# Patient Record
Sex: MALE | Race: ASIAN | NOT HISPANIC OR LATINO | URBAN - METROPOLITAN AREA
[De-identification: names, ages, dates, MRNs, and addresses within clinical notes are randomized per-mention and may not be internally consistent; named-entity substitution may affect disease eponyms.]

---

## 2024-05-30 ENCOUNTER — INPATIENT (INPATIENT)
Facility: HOSPITAL | Age: 74
LOS: 10 days | Discharge: NURSING, REHAB OR LONG-TERM CARE | DRG: 948 | End: 2024-06-10
Attending: INTERNAL MEDICINE | Admitting: INTERNAL MEDICINE
Payer: COMMERCIAL

## 2024-05-30 VITALS
HEART RATE: 91 BPM | DIASTOLIC BLOOD PRESSURE: 73 MMHG | OXYGEN SATURATION: 98 % | RESPIRATION RATE: 19 BRPM | TEMPERATURE: 99 F | SYSTOLIC BLOOD PRESSURE: 145 MMHG

## 2024-05-30 DIAGNOSIS — Z29.9 ENCOUNTER FOR PROPHYLACTIC MEASURES, UNSPECIFIED: ICD-10-CM

## 2024-05-30 DIAGNOSIS — G93.40 ENCEPHALOPATHY, UNSPECIFIED: ICD-10-CM

## 2024-05-30 DIAGNOSIS — F03.90 UNSPECIFIED DEMENTIA, UNSPECIFIED SEVERITY, WITHOUT BEHAVIORAL DISTURBANCE, PSYCHOTIC DISTURBANCE, MOOD DISTURBANCE, AND ANXIETY: ICD-10-CM

## 2024-05-30 DIAGNOSIS — N17.9 ACUTE KIDNEY FAILURE, UNSPECIFIED: ICD-10-CM

## 2024-05-30 DIAGNOSIS — R41.82 ALTERED MENTAL STATUS, UNSPECIFIED: ICD-10-CM

## 2024-05-30 DIAGNOSIS — E11.9 TYPE 2 DIABETES MELLITUS WITHOUT COMPLICATIONS: ICD-10-CM

## 2024-05-30 DIAGNOSIS — I10 ESSENTIAL (PRIMARY) HYPERTENSION: ICD-10-CM

## 2024-05-30 LAB
ALBUMIN SERPL ELPH-MCNC: 3.6 G/DL — SIGNIFICANT CHANGE UP (ref 3.5–5)
ALP SERPL-CCNC: 100 U/L — SIGNIFICANT CHANGE UP (ref 40–120)
ALT FLD-CCNC: 25 U/L DA — SIGNIFICANT CHANGE UP (ref 10–60)
AMMONIA BLD-MCNC: 28 UMOL/L — SIGNIFICANT CHANGE UP (ref 11–32)
AMPHET UR-MCNC: NEGATIVE — SIGNIFICANT CHANGE UP
ANION GAP SERPL CALC-SCNC: 7 MMOL/L — SIGNIFICANT CHANGE UP (ref 5–17)
APPEARANCE UR: CLEAR — SIGNIFICANT CHANGE UP
AST SERPL-CCNC: 16 U/L — SIGNIFICANT CHANGE UP (ref 10–40)
BARBITURATES UR SCN-MCNC: NEGATIVE — SIGNIFICANT CHANGE UP
BASOPHILS # BLD AUTO: 0.05 K/UL — SIGNIFICANT CHANGE UP (ref 0–0.2)
BASOPHILS NFR BLD AUTO: 0.5 % — SIGNIFICANT CHANGE UP (ref 0–2)
BENZODIAZ UR-MCNC: NEGATIVE — SIGNIFICANT CHANGE UP
BILIRUB SERPL-MCNC: 0.4 MG/DL — SIGNIFICANT CHANGE UP (ref 0.2–1.2)
BILIRUB UR-MCNC: NEGATIVE — SIGNIFICANT CHANGE UP
BUN SERPL-MCNC: 38 MG/DL — HIGH (ref 7–18)
CALCIUM SERPL-MCNC: 9.5 MG/DL — SIGNIFICANT CHANGE UP (ref 8.4–10.5)
CHLORIDE SERPL-SCNC: 108 MMOL/L — SIGNIFICANT CHANGE UP (ref 96–108)
CO2 SERPL-SCNC: 23 MMOL/L — SIGNIFICANT CHANGE UP (ref 22–31)
COCAINE METAB.OTHER UR-MCNC: NEGATIVE — SIGNIFICANT CHANGE UP
COLOR SPEC: YELLOW — SIGNIFICANT CHANGE UP
CREAT ?TM UR-MCNC: 49 MG/DL — SIGNIFICANT CHANGE UP
CREAT SERPL-MCNC: 1.56 MG/DL — HIGH (ref 0.5–1.3)
DIFF PNL FLD: NEGATIVE — SIGNIFICANT CHANGE UP
EGFR: 46 ML/MIN/1.73M2 — LOW
EOSINOPHIL # BLD AUTO: 0.39 K/UL — SIGNIFICANT CHANGE UP (ref 0–0.5)
EOSINOPHIL NFR BLD AUTO: 4.2 % — SIGNIFICANT CHANGE UP (ref 0–6)
ETHANOL SERPL-MCNC: <3 MG/DL — SIGNIFICANT CHANGE UP (ref 0–10)
GLUCOSE BLDC GLUCOMTR-MCNC: 131 MG/DL — HIGH (ref 70–99)
GLUCOSE BLDC GLUCOMTR-MCNC: 183 MG/DL — HIGH (ref 70–99)
GLUCOSE BLDC GLUCOMTR-MCNC: 255 MG/DL — HIGH (ref 70–99)
GLUCOSE SERPL-MCNC: 363 MG/DL — HIGH (ref 70–99)
GLUCOSE UR QL: >=1000 MG/DL
HCT VFR BLD CALC: 46.3 % — SIGNIFICANT CHANGE UP (ref 39–50)
HGB BLD-MCNC: 15 G/DL — SIGNIFICANT CHANGE UP (ref 13–17)
HIV 1 & 2 AB SERPL IA.RAPID: SIGNIFICANT CHANGE UP
IMM GRANULOCYTES NFR BLD AUTO: 0.3 % — SIGNIFICANT CHANGE UP (ref 0–0.9)
KETONES UR-MCNC: ABNORMAL MG/DL
LACTATE SERPL-SCNC: 1.9 MMOL/L — SIGNIFICANT CHANGE UP (ref 0.7–2)
LEUKOCYTE ESTERASE UR-ACNC: NEGATIVE — SIGNIFICANT CHANGE UP
LYMPHOCYTES # BLD AUTO: 1.79 K/UL — SIGNIFICANT CHANGE UP (ref 1–3.3)
LYMPHOCYTES # BLD AUTO: 19.1 % — SIGNIFICANT CHANGE UP (ref 13–44)
MAGNESIUM SERPL-MCNC: 2.1 MG/DL — SIGNIFICANT CHANGE UP (ref 1.6–2.6)
MCHC RBC-ENTMCNC: 28.9 PG — SIGNIFICANT CHANGE UP (ref 27–34)
MCHC RBC-ENTMCNC: 32.4 GM/DL — SIGNIFICANT CHANGE UP (ref 32–36)
MCV RBC AUTO: 89.2 FL — SIGNIFICANT CHANGE UP (ref 80–100)
METHADONE UR-MCNC: NEGATIVE — SIGNIFICANT CHANGE UP
MONOCYTES # BLD AUTO: 0.6 K/UL — SIGNIFICANT CHANGE UP (ref 0–0.9)
MONOCYTES NFR BLD AUTO: 6.4 % — SIGNIFICANT CHANGE UP (ref 2–14)
NEUTROPHILS # BLD AUTO: 6.53 K/UL — SIGNIFICANT CHANGE UP (ref 1.8–7.4)
NEUTROPHILS NFR BLD AUTO: 69.5 % — SIGNIFICANT CHANGE UP (ref 43–77)
NITRITE UR-MCNC: NEGATIVE — SIGNIFICANT CHANGE UP
NRBC # BLD: 0 /100 WBCS — SIGNIFICANT CHANGE UP (ref 0–0)
OPIATES UR-MCNC: NEGATIVE — SIGNIFICANT CHANGE UP
OSMOLALITY UR: 697 MOS/KG — SIGNIFICANT CHANGE UP (ref 50–1200)
PCP SPEC-MCNC: SIGNIFICANT CHANGE UP
PCP UR-MCNC: NEGATIVE — SIGNIFICANT CHANGE UP
PH UR: 6 — SIGNIFICANT CHANGE UP (ref 5–8)
PHOSPHATE SERPL-MCNC: 3.1 MG/DL — SIGNIFICANT CHANGE UP (ref 2.5–4.5)
PLATELET # BLD AUTO: 259 K/UL — SIGNIFICANT CHANGE UP (ref 150–400)
POTASSIUM SERPL-MCNC: 4.7 MMOL/L — SIGNIFICANT CHANGE UP (ref 3.5–5.3)
POTASSIUM SERPL-SCNC: 4.7 MMOL/L — SIGNIFICANT CHANGE UP (ref 3.5–5.3)
PROT SERPL-MCNC: 7.7 G/DL — SIGNIFICANT CHANGE UP (ref 6–8.3)
PROT UR-MCNC: NEGATIVE MG/DL — SIGNIFICANT CHANGE UP
RBC # BLD: 5.19 M/UL — SIGNIFICANT CHANGE UP (ref 4.2–5.8)
RBC # FLD: 14.3 % — SIGNIFICANT CHANGE UP (ref 10.3–14.5)
SODIUM SERPL-SCNC: 138 MMOL/L — SIGNIFICANT CHANGE UP (ref 135–145)
SODIUM UR-SCNC: 99 MMOL/L — SIGNIFICANT CHANGE UP
SP GR SPEC: 1.02 — SIGNIFICANT CHANGE UP (ref 1–1.03)
THC UR QL: NEGATIVE — SIGNIFICANT CHANGE UP
UROBILINOGEN FLD QL: 0.2 MG/DL — SIGNIFICANT CHANGE UP (ref 0.2–1)
WBC # BLD: 9.39 K/UL — SIGNIFICANT CHANGE UP (ref 3.8–10.5)
WBC # FLD AUTO: 9.39 K/UL — SIGNIFICANT CHANGE UP (ref 3.8–10.5)

## 2024-05-30 PROCEDURE — 70551 MRI BRAIN STEM W/O DYE: CPT | Mod: 26

## 2024-05-30 PROCEDURE — 90792 PSYCH DIAG EVAL W/MED SRVCS: CPT

## 2024-05-30 PROCEDURE — 99285 EMERGENCY DEPT VISIT HI MDM: CPT | Mod: 25

## 2024-05-30 PROCEDURE — 99223 1ST HOSP IP/OBS HIGH 75: CPT

## 2024-05-30 PROCEDURE — 93010 ELECTROCARDIOGRAM REPORT: CPT

## 2024-05-30 PROCEDURE — 70450 CT HEAD/BRAIN W/O DYE: CPT | Mod: 26,MC

## 2024-05-30 RX ORDER — GABAPENTIN 400 MG/1
0 CAPSULE ORAL
Refills: 0 | DISCHARGE

## 2024-05-30 RX ORDER — HEPARIN SODIUM 5000 [USP'U]/ML
5000 INJECTION INTRAVENOUS; SUBCUTANEOUS EVERY 8 HOURS
Refills: 0 | Status: DISCONTINUED | OUTPATIENT
Start: 2024-05-30 | End: 2024-05-31

## 2024-05-30 RX ORDER — GABAPENTIN 400 MG/1
300 CAPSULE ORAL DAILY
Refills: 0 | Status: DISCONTINUED | OUTPATIENT
Start: 2024-05-30 | End: 2024-06-10

## 2024-05-30 RX ORDER — CARVEDILOL PHOSPHATE 80 MG/1
12.5 CAPSULE, EXTENDED RELEASE ORAL EVERY 12 HOURS
Refills: 0 | Status: DISCONTINUED | OUTPATIENT
Start: 2024-05-30 | End: 2024-06-10

## 2024-05-30 RX ORDER — QUETIAPINE FUMARATE 200 MG/1
25 TABLET, FILM COATED ORAL EVERY 6 HOURS
Refills: 0 | Status: DISCONTINUED | OUTPATIENT
Start: 2024-05-30 | End: 2024-05-31

## 2024-05-30 RX ORDER — LANOLIN ALCOHOL/MO/W.PET/CERES
3 CREAM (GRAM) TOPICAL AT BEDTIME
Refills: 0 | Status: DISCONTINUED | OUTPATIENT
Start: 2024-05-30 | End: 2024-06-10

## 2024-05-30 RX ORDER — ONDANSETRON 8 MG/1
4 TABLET, FILM COATED ORAL EVERY 8 HOURS
Refills: 0 | Status: DISCONTINUED | OUTPATIENT
Start: 2024-05-30 | End: 2024-06-10

## 2024-05-30 RX ORDER — AMLODIPINE BESYLATE 2.5 MG/1
5 TABLET ORAL EVERY 24 HOURS
Refills: 0 | Status: DISCONTINUED | OUTPATIENT
Start: 2024-05-30 | End: 2024-06-10

## 2024-05-30 RX ORDER — INSULIN LISPRO 100/ML
VIAL (ML) SUBCUTANEOUS AT BEDTIME
Refills: 0 | Status: DISCONTINUED | OUTPATIENT
Start: 2024-05-30 | End: 2024-06-10

## 2024-05-30 RX ORDER — MIRTAZAPINE 45 MG/1
7.5 TABLET, ORALLY DISINTEGRATING ORAL AT BEDTIME
Refills: 0 | Status: DISCONTINUED | OUTPATIENT
Start: 2024-05-30 | End: 2024-06-10

## 2024-05-30 RX ORDER — QUETIAPINE FUMARATE 200 MG/1
25 TABLET, FILM COATED ORAL AT BEDTIME
Refills: 0 | Status: DISCONTINUED | OUTPATIENT
Start: 2024-05-30 | End: 2024-05-31

## 2024-05-30 RX ORDER — BRIMONIDINE TARTRATE, TIMOLOL MALEATE 2; 5 MG/ML; MG/ML
1 SOLUTION/ DROPS OPHTHALMIC
Refills: 0 | DISCHARGE

## 2024-05-30 RX ORDER — ATORVASTATIN CALCIUM 80 MG/1
80 TABLET, FILM COATED ORAL AT BEDTIME
Refills: 0 | Status: DISCONTINUED | OUTPATIENT
Start: 2024-05-30 | End: 2024-06-10

## 2024-05-30 RX ORDER — CHOLECALCIFEROL (VITAMIN D3) 125 MCG
1 CAPSULE ORAL
Refills: 0 | DISCHARGE

## 2024-05-30 RX ORDER — PANTOPRAZOLE SODIUM 20 MG/1
40 TABLET, DELAYED RELEASE ORAL
Refills: 0 | Status: DISCONTINUED | OUTPATIENT
Start: 2024-05-30 | End: 2024-06-10

## 2024-05-30 RX ORDER — INSULIN LISPRO 100/ML
5 VIAL (ML) SUBCUTANEOUS
Refills: 0 | Status: DISCONTINUED | OUTPATIENT
Start: 2024-05-30 | End: 2024-06-05

## 2024-05-30 RX ORDER — INSULIN GLARGINE 100 [IU]/ML
20 INJECTION, SOLUTION SUBCUTANEOUS AT BEDTIME
Refills: 0 | Status: DISCONTINUED | OUTPATIENT
Start: 2024-05-30 | End: 2024-06-05

## 2024-05-30 RX ORDER — INSULIN LISPRO 100/ML
VIAL (ML) SUBCUTANEOUS EVERY 6 HOURS
Refills: 0 | Status: DISCONTINUED | OUTPATIENT
Start: 2024-05-30 | End: 2024-05-30

## 2024-05-30 RX ORDER — LISINOPRIL 2.5 MG/1
20 TABLET ORAL DAILY
Refills: 0 | Status: DISCONTINUED | OUTPATIENT
Start: 2024-05-30 | End: 2024-06-10

## 2024-05-30 RX ORDER — LATANOPROST 0.05 MG/ML
1 SOLUTION/ DROPS OPHTHALMIC; TOPICAL AT BEDTIME
Refills: 0 | Status: DISCONTINUED | OUTPATIENT
Start: 2024-05-30 | End: 2024-06-10

## 2024-05-30 RX ORDER — MIRTAZAPINE 45 MG/1
1 TABLET, ORALLY DISINTEGRATING ORAL
Refills: 0 | DISCHARGE

## 2024-05-30 RX ORDER — CARVEDILOL PHOSPHATE 80 MG/1
1 CAPSULE, EXTENDED RELEASE ORAL
Refills: 0 | DISCHARGE

## 2024-05-30 RX ORDER — LATANOPROST 0.05 MG/ML
1 SOLUTION/ DROPS OPHTHALMIC; TOPICAL
Refills: 0 | DISCHARGE

## 2024-05-30 RX ORDER — AMLODIPINE BESYLATE 2.5 MG/1
1 TABLET ORAL
Refills: 0 | DISCHARGE

## 2024-05-30 RX ORDER — SODIUM CHLORIDE 9 MG/ML
1000 INJECTION INTRAMUSCULAR; INTRAVENOUS; SUBCUTANEOUS ONCE
Refills: 0 | Status: COMPLETED | OUTPATIENT
Start: 2024-05-30 | End: 2024-05-30

## 2024-05-30 RX ORDER — ACETAMINOPHEN 500 MG
650 TABLET ORAL EVERY 6 HOURS
Refills: 0 | Status: DISCONTINUED | OUTPATIENT
Start: 2024-05-30 | End: 2024-06-10

## 2024-05-30 RX ORDER — PANTOPRAZOLE SODIUM 20 MG/1
1 TABLET, DELAYED RELEASE ORAL
Refills: 0 | DISCHARGE

## 2024-05-30 RX ORDER — INSULIN ASPART 100 [IU]/ML
10 INJECTION, SOLUTION SUBCUTANEOUS
Refills: 0 | DISCHARGE

## 2024-05-30 RX ORDER — INSULIN LISPRO 100/ML
VIAL (ML) SUBCUTANEOUS
Refills: 0 | Status: DISCONTINUED | OUTPATIENT
Start: 2024-05-30 | End: 2024-05-30

## 2024-05-30 RX ORDER — FUROSEMIDE 40 MG
1 TABLET ORAL
Refills: 0 | DISCHARGE

## 2024-05-30 RX ORDER — INSULIN LISPRO 100/ML
VIAL (ML) SUBCUTANEOUS AT BEDTIME
Refills: 0 | Status: DISCONTINUED | OUTPATIENT
Start: 2024-05-30 | End: 2024-05-30

## 2024-05-30 RX ORDER — INSULIN LISPRO 100/ML
VIAL (ML) SUBCUTANEOUS
Refills: 0 | Status: DISCONTINUED | OUTPATIENT
Start: 2024-05-30 | End: 2024-06-10

## 2024-05-30 RX ORDER — LISINOPRIL 2.5 MG/1
1 TABLET ORAL
Refills: 0 | DISCHARGE

## 2024-05-30 RX ADMIN — Medication 3 MILLIGRAM(S): at 23:06

## 2024-05-30 RX ADMIN — MIRTAZAPINE 7.5 MILLIGRAM(S): 45 TABLET, ORALLY DISINTEGRATING ORAL at 21:40

## 2024-05-30 RX ADMIN — Medication 3: at 10:25

## 2024-05-30 RX ADMIN — AMLODIPINE BESYLATE 5 MILLIGRAM(S): 2.5 TABLET ORAL at 16:47

## 2024-05-30 RX ADMIN — LATANOPROST 1 DROP(S): 0.05 SOLUTION/ DROPS OPHTHALMIC; TOPICAL at 21:40

## 2024-05-30 RX ADMIN — INSULIN GLARGINE 20 UNIT(S): 100 INJECTION, SOLUTION SUBCUTANEOUS at 22:44

## 2024-05-30 RX ADMIN — ATORVASTATIN CALCIUM 80 MILLIGRAM(S): 80 TABLET, FILM COATED ORAL at 21:40

## 2024-05-30 RX ADMIN — QUETIAPINE FUMARATE 25 MILLIGRAM(S): 200 TABLET, FILM COATED ORAL at 21:40

## 2024-05-30 RX ADMIN — SODIUM CHLORIDE 2000 MILLILITER(S): 9 INJECTION INTRAMUSCULAR; INTRAVENOUS; SUBCUTANEOUS at 06:38

## 2024-05-30 NOTE — BH CONSULTATION LIAISON ASSESSMENT NOTE - NSBHCHARTREVIEWVS_PSY_A_CORE FT
Vital Signs Last 24 Hrs  T(C): 36.4 (30 May 2024 14:54), Max: 37.1 (30 May 2024 06:09)  T(F): 97.5 (30 May 2024 14:54), Max: 98.7 (30 May 2024 06:09)  HR: 69 (30 May 2024 14:54) (69 - 91)  BP: 149/53 (30 May 2024 14:54) (145/73 - 165/81)  BP(mean): --  RR: 18 (30 May 2024 14:54) (18 - 19)  SpO2: 96% (30 May 2024 14:54) (96% - 98%)    Parameters below as of 30 May 2024 12:31  Patient On (Oxygen Delivery Method): room air

## 2024-05-30 NOTE — BH CONSULTATION LIAISON ASSESSMENT NOTE - NSBHCHARTREVIEWLAB_PSY_A_CORE FT
CBC Full  -  ( 30 May 2024 06:25 )  WBC Count : 9.39 K/uL  RBC Count : 5.19 M/uL  Hemoglobin : 15.0 g/dL  Hematocrit : 46.3 %  Platelet Count - Automated : 259 K/uL  Mean Cell Volume : 89.2 fl  Mean Cell Hemoglobin : 28.9 pg  Mean Cell Hemoglobin Concentration : 32.4 gm/dL  Auto Neutrophil # : 6.53 K/uL  Auto Lymphocyte # : 1.79 K/uL  Auto Monocyte # : 0.60 K/uL  Auto Eosinophil # : 0.39 K/uL  Auto Basophil # : 0.05 K/uL  Auto Neutrophil % : 69.5 %  Auto Lymphocyte % : 19.1 %  Auto Monocyte % : 6.4 %  Auto Eosinophil % : 4.2 %  Auto Basophil % : 0.5 %  05-30    138  |  108  |  38<H>  ----------------------------<  363<H>  4.7   |  23  |  1.56<H>    Ca    9.5      30 May 2024 06:25  Phos  3.1     05-30  Mg     2.1     05-30    TPro  7.7  /  Alb  3.6  /  TBili  0.4  /  DBili  x   /  AST  16  /  ALT  25  /  AlkPhos  100  05-30

## 2024-05-30 NOTE — PATIENT PROFILE ADULT - FUNCTIONAL ASSESSMENT - BASIC MOBILITY 6.
2-calculated by average/Not able to assess (calculate score using [facility] averaging method)

## 2024-05-30 NOTE — PROVIDER CONTACT NOTE (EICU) - ACTION/TREATMENT ORDERED:
Home medications reviewed with Pharmacist at [facility], [phone]. Medication reconciliation updated.

## 2024-05-30 NOTE — ED PROVIDER NOTE - OBJECTIVE STATEMENT
74-year-old male with dementia brought in by family for aggressive behavior.  Patient's granddaughter at bedside says patient is aggressive every other day however the able to calm him down however last night they were not able to calm him down.  He was throwing things and not redirectable.  He has not been more short of breath

## 2024-05-30 NOTE — H&P ADULT - NSHPPHYSICALEXAM_GEN_ALL_CORE
General - NAD, lying in bed, appears comfortable, confused, elderly  Eyes - PERRLA, EOM intact  ENT - Nonicteric sclerae, PERRLA, EOMI. Oropharynx clear. Moist mucous membranes. Conjunctivae appear well perfused.   Neck - No noticeable or palpable swelling, redness or rash around throat or on face  Lymph Nodes - No lymphadenopathy  Cardiovascular - RRR no m/r/g, no JVD, no carotid bruits  Lungs - Clear to auscultation, no use of accessory muscles, no crackles or wheezes.  Skin - No rashes, skin warm and dry, no erythematous areas  Abdomen - Normal bowel sounds, abdomen soft and nontender  Rectal – Rectal exam not performed since no symptoms indicated blood loss.  Extremities - No edema, cyanosis or clubbing  Musculoskeletal - 5/5 strength, normal range of motion, no swollen or erythematous joints.  Neuro– Alert and oriented x 2, visually impaired in both eyes.

## 2024-05-30 NOTE — H&P ADULT - HISTORY OF PRESENT ILLNESS
74M PMH dementia, DM, and HTN presenting to the ED with AMS. Pt seen at bedside [email or web address], states he does not know why he was brought to the hospital, but was complaining of bilateral knee pain. Daughter called for collateral information, reports that for the past week, her father has become more confused and aggressive at home, pulling out electrical wires at home and trying to flush his clothes down the toilet. He has a HHA during the day for 12 hours x 5 days and his granddaughter stays the rest of the night with him, however, he has been becoming more aggressive and cursing out the granddaughter. He has been having urinary and bowel incontinence. He is blind in both eyes and ambulates with a walker however has been losing his balance lately with no falls. He denies any fevers, chills, CP, SOB, N/V/D, abdominal pain, dysuria, numbness/tingling in extremities.  74M PMH dementia, DM, and HTN presenting to the ED with AMS. Pt seen at bedside [email or web address], states he does not know why he was brought to the hospital, but was complaining of bilateral knee pain. Daughter called for collateral information, reports that for the past week, her father has become more confused and aggressive at home, pulling out electrical wires at home and trying to flush his clothes down the toilet. He has a HHA during the day for 12 hours x 5 days and his granddaughter stays the rest of the night with him, however, he has been becoming more aggressive and cursing out the granddaughter. He has been having urinary and bowel incontinence. He is blind in both eyes and ambulates with a walker however has been losing his balance lately with no falls. He denies any fevers, chills, CP, SOB, N/V/D, abdominal pain, dysuria, numbness/tingling in extremities.

## 2024-05-30 NOTE — H&P ADULT - PROBLEM SELECTOR PLAN 2
Xifaxan Pending    Insurance response  Prescription Drug Insurance: [facility]  Notes: Prior authorization submitted to patient insurance company and will update provider when decision has been made.        SCr 1.56, unknown baseline  received 1L IVF in ED   f/u urine lytes  gentle hydration  trend SCr

## 2024-05-30 NOTE — ED PROVIDER NOTE - NS ED MD DISPO SPECIAL CONSIDERATION1
None Partially impaired: cannot see medication labels or newsprint, but can see obstacles in path, and the surrounding layout; can count fingers at arm's length

## 2024-05-30 NOTE — BH CONSULTATION LIAISON ASSESSMENT NOTE - CURRENT MEDICATION
MEDICATIONS  (STANDING):  amLODIPine   Tablet 5 milliGRAM(s) Oral every 24 hours  atorvastatin 80 milliGRAM(s) Oral at bedtime  carvedilol 12.5 milliGRAM(s) Oral every 12 hours  gabapentin 300 milliGRAM(s) Oral daily  heparin   Injectable 5000 Unit(s) SubCutaneous every 8 hours  insulin lispro (ADMELOG) corrective regimen sliding scale   SubCutaneous at bedtime  insulin lispro (ADMELOG) corrective regimen sliding scale   SubCutaneous three times a day before meals  latanoprost 0.005% Ophthalmic Solution 1 Drop(s) Both EYES at bedtime  lisinopril 20 milliGRAM(s) Oral daily  mirtazapine 7.5 milliGRAM(s) Oral at bedtime  pantoprazole    Tablet 40 milliGRAM(s) Oral before breakfast    MEDICATIONS  (PRN):  acetaminophen     Tablet .. 650 milliGRAM(s) Oral every 6 hours PRN Temp greater or equal to 38C (100.4F), Mild Pain (1 - 3)  aluminum hydroxide/magnesium hydroxide/simethicone Suspension 30 milliLiter(s) Oral every 4 hours PRN Dyspepsia  melatonin 3 milliGRAM(s) Oral at bedtime PRN Insomnia  ondansetron Injectable 4 milliGRAM(s) IV Push every 8 hours PRN Nausea and/or Vomiting

## 2024-05-30 NOTE — BH CONSULTATION LIAISON ASSESSMENT NOTE - NSICDXBHSECONDARYDX_PSY_ALL_CORE
Acute encephalopathy   G93.40  Dementia   F03.90  DM (diabetes mellitus)   E11.9  HTN (hypertension)   I10  Prophylactic measure   Z29.9  VITA (acute kidney injury)   N17.9

## 2024-05-30 NOTE — H&P ADULT - ASSESSMENT
74M PMH dementia, DM, and HTN presenting to the ED with AMS admitted for acute encephalopathy 2/2 worsening dementia vs NPH

## 2024-05-30 NOTE — ED PROVIDER NOTE - PHYSICAL EXAMINATION
General: Well appearing, not agitated, appears stated age  HEENT: normocephalic, atraumatic   Respiratory: normal work of breathing  MSK: no swelling or tenderness of lower extremities, moving all extremities spontaneously   Skin: warm, dry  Neuro: A&Ox1, cranial nerves II-XII intact, 5/5 strength in all extremities, no sensory deficits, normal gait   Psych: appropriate affect

## 2024-05-30 NOTE — BH CONSULTATION LIAISON ASSESSMENT NOTE - SUMMARY
73 y/o M with a hx of dementia, HTN, DM2, and blindness, who is admitted due to aggression and behavioral disturbances at home. Consulted for the same reason. Patient's presentation appears to be consistent with a major neurocognitive disorder like an advancing dementia with behavioral distuabrnces. He was irritable and mildly agitated upon interview. He is not suicidal, homicidal or psychotic.    -Consider Seroquel 25 mg PO @ 8-9PM  -PRN: Seroquel 25 mg PO q 6 hrs PRN for mild to moderate agitation/insomnia            Haldol 1 mg IM q 8 hrs PRN for severe agitation  -No need for an inpatient psychiatric admission or 1:1; might need EO as per primary team  -Check an EKG; monitor his QTc  -SW f/u  -Case discussed with the primary team  -Will follow as needed

## 2024-05-30 NOTE — CONSULT NOTE ADULT - SUBJECTIVE AND OBJECTIVE BOX
NEUROLOGY CONSULT NOTE    NAME:  MAXX PONCE      ASSESSMENT:  74 RHM with chronic b/l blindness and recent decline in cognition and development of behavioral disturbances and insomnia over 6 months, concerning for dementia vs. normal pressure hydrocephalus      RECOMMENDATIONS:    - Maintain Q4H VS & Neurochecks    - Okay to administer Quetiapine 25mg PO QHS to help manage behavioral disturbances and insomnia    - An additional Quetiapine 25mg PO Daily PRN Agitation can be made available    - MRI Brain with CSF Flow approved to evaluate for intracranial structural abnormalities and any evidence of CSF flow obstruction       - Contrast is avoided due to low GFR    - I have discussed the risks and benefits of bedside lumbar puncture with the patient's daughter       - Patient daughter will review information and does not wish to pursue this procedure at this time       - Lumbar puncture may be reconsidered based on MRI Brain findings    - Metabolic and infectious workup and treatment, including management of diabetes, as per primary team    - DVT ppx: SCDs, Heparin          NOTE TO BE COMPLETED - PLEASE REFER TO ABOVE ONLY AND IGNORE INFORMATION BELOW    *******************************      CHIEF COMPLAINT:  Patient is a 74y old  Male who presents with a chief complaint of AMS (30 May 2024 10:14)      HPI:  74M PMH dementia, DM, and HTN presenting to the ED with AMS. Pt seen at bedside AAOX2, states he does not know why he was brought to the hospital, but was complaining of bilateral knee pain. Daughter called for collateral information, reports that for the past week, her father has become more confused and aggressive at home, pulling out electrical wires at home and trying to flush his clothes down the toilet. He has a HHA during the day for 12 hours x 5 days and his granddaughter stays the rest of the night with him, however, he has been becoming more aggressive and cursing out the granddaughter. He has been having urinary and bowel incontinence. He is blind in both eyes and ambulates with a walker however has been losing his balance lately with no falls. He denies any fevers, chills, CP, SOB, N/V/D, abdominal pain, dysuria, numbness/tingling in extremities.      (30 May 2024 10:14)      NEURO HPI:      PAST MEDICAL & SURGICAL HISTORY:      MEDICATIONS:  acetaminophen     Tablet .. 650 milliGRAM(s) Oral every 6 hours PRN  aluminum hydroxide/magnesium hydroxide/simethicone Suspension 30 milliLiter(s) Oral every 4 hours PRN  amLODIPine   Tablet 5 milliGRAM(s) Oral every 24 hours  atorvastatin 80 milliGRAM(s) Oral at bedtime  carvedilol 12.5 milliGRAM(s) Oral every 12 hours  gabapentin 300 milliGRAM(s) Oral daily  heparin   Injectable 5000 Unit(s) SubCutaneous every 8 hours  insulin lispro (ADMELOG) corrective regimen sliding scale   SubCutaneous at bedtime  insulin lispro (ADMELOG) corrective regimen sliding scale   SubCutaneous three times a day before meals  latanoprost 0.005% Ophthalmic Solution 1 Drop(s) Both EYES at bedtime  lisinopril 20 milliGRAM(s) Oral daily  melatonin 3 milliGRAM(s) Oral at bedtime PRN  mirtazapine 7.5 milliGRAM(s) Oral at bedtime  ondansetron Injectable 4 milliGRAM(s) IV Push every 8 hours PRN  pantoprazole    Tablet 40 milliGRAM(s) Oral before breakfast      ALLERGIES:  No Known Allergies      FAMILY HISTORY:        SOCIAL HISTORY:  Denies alcohol, tobacco, or illicit drug use      REVIEW OF SYSTEMS:  GENERAL: No fever, weight changes, fatigue  EYES: No eye pain or discharge  EAR/NOSE/MOUTH/THROAT: No sinus or throat pain; No difficulty hearing  NECK: No pain or stiffness  RESPIRATORY: No cough, wheezing, chills, or hemoptysis  CARDIOVASCULAR: No chest pain, palpitations, shortness of breath, or dyspnea on exertion  GASTROINTESTINAL: No abdominal pain, nausea, vomiting, hematemesis, diarrhea, or constipation  GENITOURINARY: No dysuria, frequency, hematuria, or incontinence  SKIN: No rashes or lesions  ENDOCRINE: No heat or cold intolerance  HEMATOLOGIC: No easy bruising or bleeding  PSYCHIATRIC: No depression, anxiety, or mood swings  MUSCULOSKELETAL: No joint pain or swelling  NEUROLOGICAL: As per HPI          OBJECTIVE:    Vital Signs Last 24 Hrs  T(C): 36.4 (30 May 2024 14:54), Max: 37.1 (30 May 2024 06:09)  T(F): 97.5 (30 May 2024 14:54), Max: 98.7 (30 May 2024 06:09)  HR: 69 (30 May 2024 14:54) (69 - 91)  BP: 149/53 (30 May 2024 14:54) (145/73 - 165/81)  RR: 18 (30 May 2024 14:54) (18 - 19)  SpO2: 96% (30 May 2024 14:54) (96% - 98%)  Parameters below as of 30 May 2024 12:31  Patient On (Oxygen Delivery Method): room air      General Examination:  General: No acute distress  HEENT: Atraumatic, Normocephalic  Respiratory: CTA B/l.  No crackles, rhonchi, or wheezes.  Cardiovascular: RRR.  Normal S1 & S2.  Normal b/l radial and pedal pulses.    Neurological Examination:  General / Mental Status: AAO x 3.  No aphasia or dysarthria.  Naming and repetition intact.  Cranial Nerves: VFF x 4.  PERRL.  EOMI x 2, No nystagmus or diplopia.  B/l V1-V3 equal and intact to light touch and pinprick.  Symmetric facial movement and palate elevation.  B/l hearing equal to finger rub.  5/5 strength with b/l sternocleidomastoid and trapezius.  Midline tongue protrusion, with no atrophy or fasciculations.  Motor: Normal bulk & tone in all four extremities.  5/5 strength throughout all four extremities.  No downward drift, rigidity, spasticity, or tremors in any of the four extremities.  Sensory: Intact to light touch and pinprick in all four extremities.  Negative Romberg.  Reflex: 2+ and symmetric at b/l biceps, triceps, brachioradialis, patellae, and ankles.  Downgoing toes b/l.  Coordination: No dysmetria with b/l finger-to-nose and heel raise tests.  Symmetric rapid alternating movements b/l.  Gait: Normal, narrow-based gait.  No difficulty with tiptoe, heel, and tandem gaits.        LABORATORY VALUES:                        15.0   9.39  )-----------( 259      ( 30 May 2024 06:25 )             46.3       05-30    138  |  108  |  38<H>  ----------------------------<  363<H>  4.7   |  23  |  1.56<H>    Ca    9.5      30 May 2024 06:25  Phos  3.1     05-30  Mg     2.1     05-30    TPro  7.7  /  Alb  3.6  /  TBili  0.4  /  DBili  x   /  AST  16  /  ALT  25  /  AlkPhos  100  05-30          NEUROIMAGING:          Please contact the Neurology consult service with any neurological questions.    Garth Bonilla MD   of Neurology  [facility] at [facility] NEUROLOGY CONSULT NOTE    NAME:  MAXX PONCE      ASSESSMENT:  74 RHM with chronic b/l blindness and recent decline in cognition and development of behavioral disturbances and insomnia over 6 months, concerning for dementia vs. normal pressure hydrocephalus      RECOMMENDATIONS:    - Maintain Q4H VS & Neurochecks    - Okay to administer Quetiapine 25mg PO QHS to help manage behavioral disturbances and insomnia    - An additional Quetiapine 25mg PO Daily PRN Agitation can be made available    - Check Vitamin B12, Folate, and TSH, and treat if abnormal    - MRI Brain with CSF Flow approved to evaluate for intracranial structural abnormalities and any evidence of CSF flow obstruction       - Contrast is avoided due to low GFR    - I have discussed the risks and benefits of bedside lumbar puncture with the patient's daughter       - Patient daughter will review information and does not wish to pursue this procedure at this time       - Lumbar puncture may be reconsidered based on MRI Brain findings    - Metabolic and infectious workup and treatment, including management of diabetes, as per primary team    - DVT ppx: SCDs, Heparin          *******************************      CHIEF COMPLAINT:  Patient is a 74y old  Male who presents with a chief complaint of AMS (30 May 2024 10:14)      HPI:  74M PMH dementia, DM, and HTN presenting to the ED with AMS. Pt seen at bedside AAOX2, states he does not know why he was brought to the hospital, but was complaining of bilateral knee pain. Daughter called for collateral information, reports that for the past week, her father has become more confused and aggressive at home, pulling out electrical wires at home and trying to flush his clothes down the toilet. He has a HHA during the day for 12 hours x 5 days and his granddaughter stays the rest of the night with him, however, he has been becoming more aggressive and cursing out the granddaughter. He has been having urinary and bowel incontinence. He is blind in both eyes and ambulates with a walker however has been losing his balance lately with no falls. He denies any fevers, chills, CP, SOB, N/V/D, abdominal pain, dysuria, numbness/tingling in extremities.  (30 May 2024 10:14)      NEURO HPI:  History is provided by the patient's daughter at bedside. This is a 74-year-old right-handed man with severely limited vision out of both eyes who has been observed by family to have cognitive decline, insomnia, and increasing frequency of episodes of agitation since January 2024. He has not experienced recent urinary or fecal incontinence, and he uses a cane for support while ambulating due to bilateral knee pain.      PAST MEDICAL & SURGICAL HISTORY:  Cognitive decline as per HPI  Bilateral vision deficit as per HPI      MEDICATIONS:  acetaminophen     Tablet .. 650 milliGRAM(s) Oral every 6 hours PRN  aluminum hydroxide/magnesium hydroxide/simethicone Suspension 30 milliLiter(s) Oral every 4 hours PRN  amLODIPine   Tablet 5 milliGRAM(s) Oral every 24 hours  atorvastatin 80 milliGRAM(s) Oral at bedtime  carvedilol 12.5 milliGRAM(s) Oral every 12 hours  gabapentin 300 milliGRAM(s) Oral daily  heparin   Injectable 5000 Unit(s) SubCutaneous every 8 hours  insulin lispro (ADMELOG) corrective regimen sliding scale   SubCutaneous at bedtime  insulin lispro (ADMELOG) corrective regimen sliding scale   SubCutaneous three times a day before meals  latanoprost 0.005% Ophthalmic Solution 1 Drop(s) Both EYES at bedtime  lisinopril 20 milliGRAM(s) Oral daily  melatonin 3 milliGRAM(s) Oral at bedtime PRN  mirtazapine 7.5 milliGRAM(s) Oral at bedtime  ondansetron Injectable 4 milliGRAM(s) IV Push every 8 hours PRN  pantoprazole    Tablet 40 milliGRAM(s) Oral before breakfast      ALLERGIES:  No Known Allergies      FAMILY HISTORY:  No reported family history of dementia      SOCIAL HISTORY:  No reported alcohol, tobacco, or illicit drug use      REVIEW OF SYSTEMS:  GENERAL: No fever, weight changes, fatigue  EYES: No eye pain or discharge  EAR/NOSE/MOUTH/THROAT: No sinus or throat pain; No difficulty hearing  NECK: No pain or stiffness  RESPIRATORY: No cough, wheezing, chills, or hemoptysis  CARDIOVASCULAR: No chest pain, palpitations, shortness of breath, or dyspnea on exertion  GASTROINTESTINAL: No abdominal pain, nausea, vomiting, hematemesis, diarrhea, or constipation  GENITOURINARY: No dysuria, frequency, hematuria, or incontinence  SKIN: No rashes or lesions  ENDOCRINE: No heat or cold intolerance  HEMATOLOGIC: No easy bruising or bleeding  PSYCHIATRIC: No depression, anxiety, or mood swings  MUSCULOSKELETAL: Bilateral knee pain as per HPI  NEUROLOGICAL: As per HPI          OBJECTIVE:    Vital Signs Last 24 Hrs  T(C): 36.4 (30 May 2024 14:54), Max: 37.1 (30 May 2024 06:09)  T(F): 97.5 (30 May 2024 14:54), Max: 98.7 (30 May 2024 06:09)  HR: 69 (30 May 2024 14:54) (69 - 91)  BP: 149/53 (30 May 2024 14:54) (145/73 - 165/81)  RR: 18 (30 May 2024 14:54) (18 - 19)  SpO2: 96% (30 May 2024 14:54) (96% - 98%)  Parameters below as of 30 May 2024 12:31  Patient On (Oxygen Delivery Method): room air      General Examination:  General: No acute distress  HEENT: Atraumatic, Normocephalic  Respiratory: CTA B/l.  No crackles, rhonchi, or wheezes.  Cardiovascular: RRR.  Normal S1 & S2.  Normal b/l radial and pedal pulses.    Neurological Examination:  General / Mental Status: AAO x 1 (only to person).  No apparent aphasia or dysarthria present.  Cranial Nerves: B/l blink to threat present, but unable to count fingers accurately in any of the four quadrants with either eye.  PERRL.  EOMI x 2, No nystagmus.  B/l V1-V3 equal and intact to light touch and pinprick.  Symmetric facial movement and palate elevation.  B/l hearing equal to finger rub.  At least 3/5 strength with b/l sternocleidomastoid and trapezius, limited by poor effort.  Midline tongue protrusion.  Motor: Normal bulk & tone in all four extremities.  At least 4/5 strength throughout all four extremities.  No downward drift, rigidity, spasticity, or tremors in any of the four extremities.  Sensory: Intact to light touch and pinprick in all four extremities.  Reflex: 1+ and symmetric at b/l biceps, triceps, brachioradialis, patellae, and ankles.  Mute toes b/l.  Coordination: No dysmetria with b/l finger-to-nose and heel raise tests.  Gait and Romberg sign testing deferred per patient preference.          LABORATORY VALUES:                        15.0   9.39  )-----------( 259      ( 30 May 2024 06:25 )             46.3       05-30    138  |  108  |  38<H>  ----------------------------<  363<H>  4.7   |  23  |  1.56<H>    Ca    9.5      30 May 2024 06:25  Phos  3.1     05-30  Mg     2.1     05-30    TPro  7.7  /  Alb  3.6  /  TBili  0.4  /  DBili  x   /  AST  16  /  ALT  25  /  AlkPhos  100  05-30          NEUROIMAGING:      CT Head (5/30/24):  - No acute intracranial abnormality  - Cerebral ventriculomegaly  - Incidental left occipital scalp soft tissue thickening          Please contact the Neurology consult service with any neurological questions.    Garth Bonilla MD   of Neurology  [facility] at [facility] NEUROLOGY CONSULT NOTE    NAME:  MAXX PONCE      ASSESSMENT:  74 RHM with chronic b/l blindness and recent decline in cognition and development of behavioral disturbances and insomnia over 6 months, concerning for dementia vs. normal pressure hydrocephalus      RECOMMENDATIONS:    - Maintain Q4H VS & Neurochecks    - Okay to administer Quetiapine 25mg PO QHS to help manage behavioral disturbances and insomnia    - An additional Quetiapine 25mg PO Daily PRN Agitation can be made available    - Check Vitamin B12, Folate, and TSH, and treat if abnormal    - MRI Brain with CSF Flow approved to evaluate for intracranial structural abnormalities and any evidence of CSF flow obstruction       - Contrast is avoided due to low GFR    - I have discussed the risks and benefits of bedside lumbar puncture with the patient's daughter       - Patient daughter will review information and does not wish to pursue this procedure at this time       - Lumbar puncture may be reconsidered based on MRI Brain findings    - Metabolic and infectious workup and treatment, including management of diabetes, as per primary team    - DVT ppx: SCDs, Heparin          *******************************      CHIEF COMPLAINT:  Patient is a 74y old  Male who presents with a chief complaint of AMS (30 May 2024 10:14)      HPI:  74M PMH dementia, DM, and HTN presenting to the ED with AMS. Pt seen at bedside AAOX2, states he does not know why he was brought to the hospital, but was complaining of bilateral knee pain. Daughter called for collateral information, reports that for the past week, her father has become more confused and aggressive at home, pulling out electrical wires at home and trying to flush his clothes down the toilet. He has a HHA during the day for 12 hours x 5 days and his granddaughter stays the rest of the night with him, however, he has been becoming more aggressive and cursing out the granddaughter. He has been having urinary and bowel incontinence. He is blind in both eyes and ambulates with a walker however has been losing his balance lately with no falls. He denies any fevers, chills, CP, SOB, N/V/D, abdominal pain, dysuria, numbness/tingling in extremities.  (30 May 2024 10:14)      NEURO HPI:  History is provided by the patient's daughter at bedside. This is a 74-year-old right-handed man with severely limited vision out of both eyes who has been observed by family to have cognitive decline, insomnia, and increasing frequency of episodes of agitation since January 2024. He has not experienced recent urinary or fecal incontinence, and he uses a cane for support while ambulating due to bilateral knee pain.      PAST MEDICAL & SURGICAL HISTORY:  Cognitive decline as per HPI  Bilateral vision deficit as per HPI      MEDICATIONS:  acetaminophen     Tablet .. 650 milliGRAM(s) Oral every 6 hours PRN  aluminum hydroxide/magnesium hydroxide/simethicone Suspension 30 milliLiter(s) Oral every 4 hours PRN  amLODIPine   Tablet 5 milliGRAM(s) Oral every 24 hours  atorvastatin 80 milliGRAM(s) Oral at bedtime  carvedilol 12.5 milliGRAM(s) Oral every 12 hours  gabapentin 300 milliGRAM(s) Oral daily  heparin   Injectable 5000 Unit(s) SubCutaneous every 8 hours  insulin lispro (ADMELOG) corrective regimen sliding scale   SubCutaneous at bedtime  insulin lispro (ADMELOG) corrective regimen sliding scale   SubCutaneous three times a day before meals  latanoprost 0.005% Ophthalmic Solution 1 Drop(s) Both EYES at bedtime  lisinopril 20 milliGRAM(s) Oral daily  melatonin 3 milliGRAM(s) Oral at bedtime PRN  mirtazapine 7.5 milliGRAM(s) Oral at bedtime  ondansetron Injectable 4 milliGRAM(s) IV Push every 8 hours PRN  pantoprazole    Tablet 40 milliGRAM(s) Oral before breakfast      ALLERGIES:  No Known Allergies      FAMILY HISTORY:  No reported family history of dementia      SOCIAL HISTORY:  No reported alcohol, tobacco, or illicit drug use      REVIEW OF SYSTEMS:  GENERAL: No fever, weight changes, fatigue  EYES: No eye pain or discharge  EAR/NOSE/MOUTH/THROAT: No sinus or throat pain; No difficulty hearing  NECK: No pain or stiffness  RESPIRATORY: No cough, wheezing, chills, or hemoptysis  CARDIOVASCULAR: No chest pain, palpitations, shortness of breath, or dyspnea on exertion  GASTROINTESTINAL: No abdominal pain, nausea, vomiting, hematemesis, diarrhea, or constipation  GENITOURINARY: No dysuria, frequency, hematuria, or incontinence  SKIN: No rashes or lesions  ENDOCRINE: No heat or cold intolerance  HEMATOLOGIC: No easy bruising or bleeding  PSYCHIATRIC: No depression, anxiety, or mood swings  MUSCULOSKELETAL: Bilateral knee pain as per HPI  NEUROLOGICAL: As per HPI          OBJECTIVE:    Vital Signs Last 24 Hrs  T(C): 36.4 (30 May 2024 14:54), Max: 37.1 (30 May 2024 06:09)  T(F): 97.5 (30 May 2024 14:54), Max: 98.7 (30 May 2024 06:09)  HR: 69 (30 May 2024 14:54) (69 - 91)  BP: 149/53 (30 May 2024 14:54) (145/73 - 165/81)  RR: 18 (30 May 2024 14:54) (18 - 19)  SpO2: 96% (30 May 2024 14:54) (96% - 98%)  Parameters below as of 30 May 2024 12:31  Patient On (Oxygen Delivery Method): room air      General Examination:  General: No acute distress  HEENT: Atraumatic, Normocephalic  Respiratory: CTA B/l.  No crackles, rhonchi, or wheezes.  Cardiovascular: RRR.  Normal S1 & S2.  Normal b/l radial and pedal pulses.    Neurological Examination:  General / Mental Status: AAO x 1 (only to person).  No apparent aphasia or dysarthria present.  Immediate and 5-minute delayed recall: 3/3.  Cranial Nerves: B/l blink to threat present, but unable to count fingers accurately in any of the four quadrants with either eye.  PERRL.  EOMI x 2, No nystagmus.  B/l V1-V3 equal and intact to light touch and pinprick.  Symmetric facial movement and palate elevation.  B/l hearing equal to finger rub.  At least 3/5 strength with b/l sternocleidomastoid and trapezius, limited by poor effort.  Midline tongue protrusion.  Motor: Normal bulk & tone in all four extremities.  At least 4/5 strength throughout all four extremities.  No downward drift, rigidity, spasticity, or tremors in any of the four extremities.  Sensory: Intact to light touch and pinprick in all four extremities.  Reflex: 1+ and symmetric at b/l biceps, triceps, brachioradialis, patellae, and ankles.  Mute toes b/l.  Coordination: No dysmetria with b/l finger-to-nose and heel raise tests.  Gait and Romberg sign testing deferred per patient preference.          LABORATORY VALUES:                        15.0   9.39  )-----------( 259      ( 30 May 2024 06:25 )             46.3       05-30    138  |  108  |  38<H>  ----------------------------<  363<H>  4.7   |  23  |  1.56<H>    Ca    9.5      30 May 2024 06:25  Phos  3.1     05-30  Mg     2.1     05-30    TPro  7.7  /  Alb  3.6  /  TBili  0.4  /  DBili  x   /  AST  16  /  ALT  25  /  AlkPhos  100  05-30          NEUROIMAGING:      CT Head (5/30/24):  - No acute intracranial abnormality  - Cerebral ventriculomegaly  - Incidental left occipital scalp soft tissue thickening          Please contact the Neurology consult service with any neurological questions.    Garth Bonilla MD   of Neurology  [facility] at [facility]

## 2024-05-30 NOTE — H&P ADULT - PROBLEM SELECTOR PLAN 5
h/o HTN  /81 on admission  unknown home meds, pending med rec  will start amlodipine 5 for now  monitor BP h/o HTN  /81 on admission  on lisinopril and amlodipine at home  will continue home meds  monitor BP

## 2024-05-30 NOTE — PATIENT PROFILE ADULT - FALL HARM RISK - HARM RISK INTERVENTIONS
Assistance with ambulation/Assistance OOB with selected safe patient handling equipment/Communicate Risk of Fall with Harm to all staff/Monitor for mental status changes/Move patient closer to nurses' station/Reinforce activity limits and safety measures with patient and family/Reorient to person, place and time as needed/Tailored Fall Risk Interventions/Toileting schedule using arm’s reach rule for commode and bathroom/Use of alarms - bed, chair and/or voice tab/Visual Cue: Yellow wristband and red socks/Bed in lowest position, wheels locked, appropriate side rails in place/Call bell, personal items and telephone in reach/Instruct patient to call for assistance before getting out of bed or chair/Non-slip footwear when patient is out of bed/[place] to call system/Physically safe environment - no spills, clutter or unnecessary equipment/Purposeful Proactive Rounding/Room/bathroom lighting operational, light cord in reach

## 2024-05-30 NOTE — ED ADULT NURSE NOTE - NSFALLHARMRISKINTERV_ED_ALL_ED
Assistance OOB with selected safe patient handling equipment if applicable/Assistance with ambulation/Communicate risk of Fall with Harm to all staff, patient, and family/Monitor gait and stability/Monitor for mental status changes and reorient to person, place, and time, as needed/Move patient closer to nursing station/within visual sight of ED staff/Provide visual cue: red socks, yellow wristband, yellow gown, etc/Reinforce activity limits and safety measures with patient and family/Review medications for side effects contributing to fall risk/Toileting schedule using arm’s reach rule for commode and bathroom/Use of alarms - bed, stretcher, chair and/or video monitoring/Bed in lowest position, wheels locked, appropriate side rails in place/Call bell, personal items and telephone in reach/Instruct patient to call for assistance before getting out of bed/chair/stretcher/Non-slip footwear applied when patient is off stretcher/[place] to call system/Physically safe environment - no spills, clutter or unnecessary equipment/Purposeful Proactive Rounding/Room/bathroom lighting operational, light cord in reach

## 2024-05-30 NOTE — ED ADULT NURSE REASSESSMENT NOTE - NS ED NURSE REASSESS COMMENT FT1
patient attempting to get out of bed, removing clothing, unable to redirect. Safety maintained, MD GREGG notified, pending further order.

## 2024-05-30 NOTE — ED ADULT NURSE NOTE - OBJECTIVE STATEMENT
Patient BIB EMS c/o aggression and increased confusion x last night. Denies chest pain, dizziness, headache. No SOB.

## 2024-05-30 NOTE — H&P ADULT - ATTENDING COMMENTS
74M PMH dementia, DM, and HTN presenting to the ED with AMS. Pt seen at bedside [id number], states he does not know why he was brought to the hospital, but was complaining of bilateral knee pain. Daughter called for collateral information, reports that for the past week, her father has become more confused and aggressive at home, pulling out electrical wires at home and trying to flush his clothes down the toilet. He has a HHA during the day for 12 hours x 5 days and his granddaughter stays the rest of the night with him, however, he has been becoming more aggressive and cursing out the granddaughter. He has been having urinary and bowel incontinence. He is blind in both eyes and ambulates with a walker however has been losing his balance lately with no falls. He denies any fevers, chills, CP, SOB, N/V/D, abdominal pain, dysuria, numbness/tingling in extremities.       # ACUTE ENCEPHALOPATHY VS. WORSENING DEMENTIA  # ? VENTRICULOMEGALY  - NOTED CT HEAD  - NOTED UA NEGATIVE  - NOTED UTOX NEGATIVE  - PSYCHIATRY CONSULT  - NEUROLOGY CONSULT    # B/L KNEE PAIN - ?S/T OA  - PRN PAIN CONTROL  - F/U XRAYS B /L KNEES    # VITA VS. CKD  - MONITOR CR  - AVOID NEPHROTOXIC AGENTS    # DM   - SSI + FS    # HTN    # GI AND DVT PPX 74M PMH dementia, DM, and HTN presenting to the ED with AMS. Pt seen at bedside [id number], states he does not know why he was brought to the hospital, but was complaining of bilateral knee pain. Daughter called for collateral information, reports that for the past week, her father has become more confused and aggressive at home, pulling out electrical wires at home and trying to flush his clothes down the toilet. He has a HHA during the day for 12 hours x 5 days and his granddaughter stays the rest of the night with him, however, he has been becoming more aggressive and cursing out the granddaughter. He has been having urinary and bowel incontinence. He is blind in both eyes and ambulates with a walker however has been losing his balance lately with no falls. He denies any fevers, chills, CP, SOB, N/V/D, abdominal pain, dysuria, numbness/tingling in extremities.       # ACUTE ENCEPHALOPATHY VS. WORSENING DEMENTIA  # ? VENTRICULOMEGALY  - NOTED CT HEAD  - NOTED UA NEGATIVE  - NOTED UTOX NEGATIVE  - F/U MRI BRAIN W/ CSF FLOW  - PSYCHIATRY CONSULT  - NEUROLOGY CONSULT    # B/L KNEE PAIN - ?S/T OA  - PRN PAIN CONTROL  - F/U XRAYS B /L KNEES    # VITA VS. CKD  - MONITOR CR  - AVOID NEPHROTOXIC AGENTS    # DM   - SSI + FS    # HTN    # VISUALLY IMPAIRED     # GI AND DVT PPX

## 2024-05-30 NOTE — BH CONSULTATION LIAISON ASSESSMENT NOTE - NSBHMSEGROOM_PSY_A_CORE
[facility] DIVISION OF KIDNEY DISEASES AND HYPERTENSION   FOLLOW UP NOTE    --------------------------------------------------------------------------------  HPI: 66 year old male with CKD, RCC s/p left nephrectomy, HTN, prostate CA s/p radiation therapy admitted to [facility] for evaluation of severe MR. Pt. was admitted at [facility] for AMS/pneumonia, where work up (PCI) showed severe MR and pt. is currently being evaluated for surgical repair vs Mitraclip procedure for MR. Pt. underwent nephrectomy ~ 3 years ago and follows with Nephrologist Dr. Winslow. As per chart review, baseline Scr ~ 1.9-2. Pt. states he takes furosemide at home for chronic LE edema.    Pt. was seen and examined today. Overnight events noted. Pt. reports feeling better. Scr stable at 1.99. As per pt. his last OP Scr was at 2.16 checked at 3 months ago at Dr. Winslow's office.    PAST HISTORY  --------------------------------------------------------------------------------  No significant changes to PMH, PSH, FHx, SHx, unless otherwise noted    ALLERGIES & MEDICATIONS  --------------------------------------------------------------------------------  Allergies    No Known Allergies    Intolerances      Standing Inpatient Medications  aspirin  chewable 81 milliGRAM(s) Oral daily  atorvastatin 80 milliGRAM(s) Oral at bedtime  DOPamine Infusion 5 MICROgram(s)/kG/Min IV Continuous <Continuous>  furosemide    Tablet 40 milliGRAM(s) Oral daily  gabapentin 600 milliGRAM(s) Oral daily  heparin  Infusion 1800 Unit(s)/Hr IV Continuous <Continuous>  pantoprazole    Tablet 40 milliGRAM(s) Oral before breakfast  tamsulosin 0.4 milliGRAM(s) Oral at bedtime    PRN Inpatient Medications  acetaminophen     Tablet .. 650 milliGRAM(s) Oral every 6 hours PRN      REVIEW OF SYSTEMS  --------------------------------------------------------------------------------  Gen: No fevers/chills  Head/Eyes/Ears: Normal hearing,   Respiratory: No dyspnea, cough  CV: No chest pain, as per HPI  GI: No abdominal pain, diarrhea  : No dysuria, hematuria  MSK: chronic LE edema (as per pt)  Skin: No rashes  Heme: No easy bruising or bleeding    All other systems were reviewed and are negative, except as noted.    VITALS/PHYSICAL EXAM  --------------------------------------------------------------------------------  T(C): 36.2 (06-15-22 @ 07:00), Max: 36.9 (06-14-22 @ 19:00)  HR: 46 (06-15-22 @ 08:10) (42 - 54)  BP: 143/67 (06-15-22 @ 08:10) (94/51 - 146/63)  RR: 19 (06-15-22 @ 08:10) (12 - 25)  SpO2: 99% (06-15-22 @ 08:10) (93% - 100%)  Wt(kg): --  Height (cm): 165.1 (06-13-22 @ 17:44)  Weight (kg): 140.614 (06-13-22 @ 17:44)  BMI (kg/m2): 51.6 (06-13-22 @ 17:44)  BSA (m2): 2.38 (06-13-22 @ 17:44)      06-14-22 @ 07:01  -  06-15-22 @ 07:00  --------------------------------------------------------  IN: 2792.2 mL / OUT: 2745 mL / NET: 47.2 mL    06-15-22 @ 07:01  -  06-15-22 @ 08:57  --------------------------------------------------------  IN: 288.5 mL / OUT: 275 mL / NET: 13.5 mL      Physical Exam:  	Gen: NAD  	HEENT: Anicteric  	Pulm: CTA B/L  	CV: S1S2+  	Abd: Soft, +BS   	Ext: No LE edema B/L  	Neuro: Awake  	Skin: Warm and dry  	    LABS/STUDIES  --------------------------------------------------------------------------------              13.1   8.91  >-----------<  264      [06-15-22 @ 00:23]              43.3     138  |  98  |  22  ----------------------------<  132      [06-15-22 @ 00:22]  4.2   |  26  |  1.99        Ca     9.1     [06-15-22 @ 00:22]      Mg     2.1     [06-15-22 @ 00:22]      Phos  3.9     [06-15-22 @ 00:22]    Creatinine Trend:  SCr 1.99 [06-15 @ 00:22]  SCr 2.09 [06-14 @ 13:22]  SCr 2.29 [06-14 @ 02:07]  SCr 2.12 [06-13 @ 18:28]   Fair

## 2024-05-30 NOTE — H&P ADULT - PROBLEM SELECTOR PLAN 1
presenting with AMS, more aggressive and confused at home over the past week   daughter reports urinary and bowel incontinence as well as worsening imbalance, but no falls  CTH: Motion limited exam. Ventricular prominence with findings suggesting normal pressure hydrocephalus. No mass effect or hemorrhage identified. Left occipital scalp soft tissue thickening.  UA (-)  UTox (-)   symptoms 2/2 worsening dementia vs NPH  f/u MRI brain  Neurology Consulted  PT consulted presenting with AMS, more aggressive and confused at home over the past week   daughter reports urinary and bowel incontinence as well as worsening imbalance, but no falls  CTH: Motion limited exam. Ventricular prominence with findings suggesting normal pressure hydrocephalus. No mass effect or hemorrhage identified. Left occipital scalp soft tissue thickening.  UA (-)  UTox (-)   symptoms 2/2 worsening dementia vs NPH  f/u MRI brain with CSF flow noncon  Neurology Dr. Bonilla Consulted  Psych consulted  PT consulted

## 2024-05-30 NOTE — ED PROVIDER NOTE - CLINICAL SUMMARY MEDICAL DECISION MAKING FREE TEXT BOX
Per granddaughter at the bedside patient was more aggressive yesterday.  Here patient is not aggressive.  He is sitting comfortably in bed.  Will do lab work to evaluate for delirium and then discussed with family if they need more help at home. Per granddaughter at the bedside patient was more aggressive yesterday.  Here patient is not aggressive.  He is sitting comfortably in bed.  Will do lab work to evaluate for delirium and then discussed with family if they need more help at home.    Discussed case with parents and's daughter over the phone who states they do not feel they can take care of her and they would like him to be admitted.

## 2024-05-30 NOTE — ED ADULT NURSE REASSESSMENT NOTE - NS ED NURSE REASSESS COMMENT FT1
Patient resting in bed, alert and oriented 2 , awaiting dispo. No acute distress noted, denies chest pain, no SOB.

## 2024-05-30 NOTE — H&P ADULT - PROBLEM SELECTOR PLAN 4
h/o DM   unknown home meds, pending med rec  blood glucose 363 on admission bmp    consistent carb diet  ISS achs for now  monitor FS h/o DM   on lantus 40 qhs and   blood glucose 363 on admission bmp, lispro 14 before breakfast and lunch, lispro 10 at dinner    consistent carb diet  will start lantus 20 at bedtime and lispro 5 premeals with ISS  monitor FS

## 2024-05-30 NOTE — BH CONSULTATION LIAISON ASSESSMENT NOTE - HPI (INCLUDE ILLNESS QUALITY, SEVERITY, DURATION, TIMING, CONTEXT, MODIFYING FACTORS, ASSOCIATED SIGNS AND SYMPTOMS)
75 y/o M with a hx of dementia, HTN, DM2, and blindness, who is admitted due to aggression and behavioral disturbances at home. Consulted for the same reason. Patient found awake, alert, but oriented to person only. He was superficially cooperative with questions, but irritable complaining about how long he had been waiting. Says that he has been fighting at home, but could not explain why. He said that he has difficulty sleeping, but denied decreased appetite. He also denied any SI, HI or AVH. Interview was limited.    Collateral from daughter: Daughter reports a history of dementia, but says that his behavior has been getting worse since January. Says that he gets upset, frustrated and combative and this morning wanted to fight his granddaughter. Says that he had been in different rehabs for months and had never received medication for his behavior. Says that family has difficulty managing him now. She provides the rest of the information.

## 2024-05-31 DIAGNOSIS — H10.9 UNSPECIFIED CONJUNCTIVITIS: ICD-10-CM

## 2024-05-31 DIAGNOSIS — Z75.8 OTHER PROBLEMS RELATED TO MEDICAL FACILITIES AND OTHER HEALTH CARE: ICD-10-CM

## 2024-05-31 LAB
A1C WITH ESTIMATED AVERAGE GLUCOSE RESULT: 11.7 % — HIGH (ref 4–5.6)
ANION GAP SERPL CALC-SCNC: 13 MMOL/L — SIGNIFICANT CHANGE UP (ref 5–17)
BUN SERPL-MCNC: 23 MG/DL — HIGH (ref 7–18)
CALCIUM SERPL-MCNC: 9.2 MG/DL — SIGNIFICANT CHANGE UP (ref 8.4–10.5)
CHLORIDE SERPL-SCNC: 112 MMOL/L — HIGH (ref 96–108)
CO2 SERPL-SCNC: 17 MMOL/L — LOW (ref 22–31)
CREAT SERPL-MCNC: 1.18 MG/DL — SIGNIFICANT CHANGE UP (ref 0.5–1.3)
EGFR: 65 ML/MIN/1.73M2 — SIGNIFICANT CHANGE UP
ESTIMATED AVERAGE GLUCOSE: 289 MG/DL — HIGH (ref 68–114)
GLUCOSE BLDC GLUCOMTR-MCNC: 143 MG/DL — HIGH (ref 70–99)
GLUCOSE BLDC GLUCOMTR-MCNC: 146 MG/DL — HIGH (ref 70–99)
GLUCOSE BLDC GLUCOMTR-MCNC: 158 MG/DL — HIGH (ref 70–99)
GLUCOSE BLDC GLUCOMTR-MCNC: 173 MG/DL — HIGH (ref 70–99)
GLUCOSE SERPL-MCNC: 179 MG/DL — HIGH (ref 70–99)
HCT VFR BLD CALC: 43.5 % — SIGNIFICANT CHANGE UP (ref 39–50)
HGB BLD-MCNC: 14 G/DL — SIGNIFICANT CHANGE UP (ref 13–17)
MAGNESIUM SERPL-MCNC: 2.2 MG/DL — SIGNIFICANT CHANGE UP (ref 1.6–2.6)
MCHC RBC-ENTMCNC: 28.5 PG — SIGNIFICANT CHANGE UP (ref 27–34)
MCHC RBC-ENTMCNC: 32.2 GM/DL — SIGNIFICANT CHANGE UP (ref 32–36)
MCV RBC AUTO: 88.6 FL — SIGNIFICANT CHANGE UP (ref 80–100)
NRBC # BLD: 0 /100 WBCS — SIGNIFICANT CHANGE UP (ref 0–0)
PHOSPHATE SERPL-MCNC: 2.9 MG/DL — SIGNIFICANT CHANGE UP (ref 2.5–4.5)
PLATELET # BLD AUTO: 237 K/UL — SIGNIFICANT CHANGE UP (ref 150–400)
POTASSIUM SERPL-MCNC: 3.8 MMOL/L — SIGNIFICANT CHANGE UP (ref 3.5–5.3)
POTASSIUM SERPL-SCNC: 3.8 MMOL/L — SIGNIFICANT CHANGE UP (ref 3.5–5.3)
RBC # BLD: 4.91 M/UL — SIGNIFICANT CHANGE UP (ref 4.2–5.8)
RBC # FLD: 14.5 % — SIGNIFICANT CHANGE UP (ref 10.3–14.5)
SODIUM SERPL-SCNC: 142 MMOL/L — SIGNIFICANT CHANGE UP (ref 135–145)
WBC # BLD: 7.53 K/UL — SIGNIFICANT CHANGE UP (ref 3.8–10.5)
WBC # FLD AUTO: 7.53 K/UL — SIGNIFICANT CHANGE UP (ref 3.8–10.5)

## 2024-05-31 PROCEDURE — 99233 SBSQ HOSP IP/OBS HIGH 50: CPT

## 2024-05-31 RX ORDER — HALOPERIDOL DECANOATE 100 MG/ML
1 INJECTION INTRAMUSCULAR EVERY 8 HOURS
Refills: 0 | Status: DISCONTINUED | OUTPATIENT
Start: 2024-05-31 | End: 2024-06-10

## 2024-05-31 RX ORDER — ENOXAPARIN SODIUM 100 MG/ML
40 INJECTION SUBCUTANEOUS EVERY 24 HOURS
Refills: 0 | Status: DISCONTINUED | OUTPATIENT
Start: 2024-05-31 | End: 2024-06-04

## 2024-05-31 RX ORDER — QUETIAPINE FUMARATE 200 MG/1
50 TABLET, FILM COATED ORAL AT BEDTIME
Refills: 0 | Status: DISCONTINUED | OUTPATIENT
Start: 2024-05-31 | End: 2024-06-10

## 2024-05-31 RX ORDER — ERYTHROMYCIN BASE 5 MG/GRAM
1 OINTMENT (GRAM) OPHTHALMIC (EYE)
Refills: 0 | Status: COMPLETED | OUTPATIENT
Start: 2024-05-31 | End: 2024-06-05

## 2024-05-31 RX ORDER — QUETIAPINE FUMARATE 200 MG/1
50 TABLET, FILM COATED ORAL EVERY 6 HOURS
Refills: 0 | Status: DISCONTINUED | OUTPATIENT
Start: 2024-05-31 | End: 2024-06-10

## 2024-05-31 RX ADMIN — CARVEDILOL PHOSPHATE 12.5 MILLIGRAM(S): 80 CAPSULE, EXTENDED RELEASE ORAL at 17:07

## 2024-05-31 RX ADMIN — Medication 5 UNIT(S): at 08:35

## 2024-05-31 RX ADMIN — Medication 1 APPLICATION(S): at 23:45

## 2024-05-31 RX ADMIN — PANTOPRAZOLE SODIUM 40 MILLIGRAM(S): 20 TABLET, DELAYED RELEASE ORAL at 06:37

## 2024-05-31 RX ADMIN — INSULIN GLARGINE 20 UNIT(S): 100 INJECTION, SOLUTION SUBCUTANEOUS at 21:33

## 2024-05-31 RX ADMIN — CARVEDILOL PHOSPHATE 12.5 MILLIGRAM(S): 80 CAPSULE, EXTENDED RELEASE ORAL at 06:37

## 2024-05-31 RX ADMIN — MIRTAZAPINE 7.5 MILLIGRAM(S): 45 TABLET, ORALLY DISINTEGRATING ORAL at 21:33

## 2024-05-31 RX ADMIN — QUETIAPINE FUMARATE 25 MILLIGRAM(S): 200 TABLET, FILM COATED ORAL at 02:19

## 2024-05-31 RX ADMIN — Medication 1: at 11:51

## 2024-05-31 RX ADMIN — Medication 1 APPLICATION(S): at 17:11

## 2024-05-31 RX ADMIN — QUETIAPINE FUMARATE 50 MILLIGRAM(S): 200 TABLET, FILM COATED ORAL at 21:32

## 2024-05-31 RX ADMIN — Medication 5 UNIT(S): at 11:50

## 2024-05-31 RX ADMIN — ENOXAPARIN SODIUM 40 MILLIGRAM(S): 100 INJECTION SUBCUTANEOUS at 21:33

## 2024-05-31 RX ADMIN — HEPARIN SODIUM 5000 UNIT(S): 5000 INJECTION INTRAVENOUS; SUBCUTANEOUS at 17:06

## 2024-05-31 RX ADMIN — LISINOPRIL 20 MILLIGRAM(S): 2.5 TABLET ORAL at 06:36

## 2024-05-31 RX ADMIN — GABAPENTIN 300 MILLIGRAM(S): 400 CAPSULE ORAL at 11:50

## 2024-05-31 RX ADMIN — HALOPERIDOL DECANOATE 1 MILLIGRAM(S): 100 INJECTION INTRAMUSCULAR at 03:59

## 2024-05-31 RX ADMIN — Medication 5 UNIT(S): at 17:05

## 2024-05-31 RX ADMIN — LATANOPROST 1 DROP(S): 0.05 SOLUTION/ DROPS OPHTHALMIC; TOPICAL at 21:32

## 2024-05-31 RX ADMIN — ATORVASTATIN CALCIUM 80 MILLIGRAM(S): 80 TABLET, FILM COATED ORAL at 21:33

## 2024-05-31 RX ADMIN — HEPARIN SODIUM 5000 UNIT(S): 5000 INJECTION INTRAVENOUS; SUBCUTANEOUS at 06:36

## 2024-05-31 RX ADMIN — AMLODIPINE BESYLATE 5 MILLIGRAM(S): 2.5 TABLET ORAL at 17:07

## 2024-05-31 RX ADMIN — Medication 1: at 17:06

## 2024-05-31 NOTE — PHYSICAL THERAPY INITIAL EVALUATION ADULT - IMPAIRED TRANSFERS: SIT/STAND, REHAB EVAL
impaired balance/cognition/impaired motor control/abnormal muscle tone/pain/impaired postural control/decreased ROM/decreased strength

## 2024-05-31 NOTE — BH CONSULTATION LIAISON PROGRESS NOTE - NSBHFUPINTERVALHXFT_PSY_A_CORE
Patient seen and chart reviewed. Patient asleep, but daughter was at the bedside. As per chart, he was agitated overnight and required a PRN. Daughter says that he is mostly irritable throughout the day, but much worse at night.

## 2024-05-31 NOTE — BH CONSULTATION LIAISON PROGRESS NOTE - NSBHCHARTREVIEWLAB_PSY_A_CORE FT
CBC Full  -  ( 31 May 2024 06:10 )  WBC Count : 7.53 K/uL  RBC Count : 4.91 M/uL  Hemoglobin : 14.0 g/dL  Hematocrit : 43.5 %  Platelet Count - Automated : 237 K/uL  Mean Cell Volume : 88.6 fl  Mean Cell Hemoglobin : 28.5 pg  Mean Cell Hemoglobin Concentration : 32.2 gm/dL  Auto Neutrophil # : x  Auto Lymphocyte # : x  Auto Monocyte # : x  Auto Eosinophil # : x  Auto Basophil # : x  Auto Neutrophil % : x  Auto Lymphocyte % : x  Auto Monocyte % : x  Auto Eosinophil % : x  Auto Basophil % : x  05-31    142  |  112<H>  |  23<H>  ----------------------------<  179<H>  3.8   |  17<L>  |  1.18    Ca    9.2      31 May 2024 06:10  Phos  2.9     05-31  Mg     2.2     05-31    TPro  7.7  /  Alb  3.6  /  TBili  0.4  /  DBili  x   /  AST  16  /  ALT  25  /  AlkPhos  100  05-30

## 2024-05-31 NOTE — CHART NOTE - NSCHARTNOTEFT_GEN_A_CORE
EVENT: QTc monitoring in light of increased Seroquel per Psych rec    BRIEF HPI:74M PMH dementia, DM, and HTN presenting to the ED with AMS admitted for acute encephalopathy 2/2 worsening dementia vs NPH    Vital Signs Last 24 Hrs  T(C): 36.7 (31 May 2024 20:09), Max: 36.7 (31 May 2024 20:09)  T(F): 98.1 (31 May 2024 20:09), Max: 98.1 (31 May 2024 20:09)  HR: 59 (31 May 2024 20:09) (59 - 99)  BP: 94/54 (31 May 2024 20:09) (94/54 - 135/73)  BP(mean): --  RR: 16 (31 May 2024 20:09) (16 - 19)  SpO2: 95% (31 May 2024 20:09) (95% - 97%)    Parameters below as of 31 May 2024 20:09  Patient On (Oxygen Delivery Method): room air    FOCUSED:  NEURO: Awake, combative on and off    PROBLEM: Agitation probably due to dementia  PLAN  Cont enhanced observation  Cont mirtazapine 7.5 lesa GRAM(s) Oral at bedtime  Cont Quetiapine 50 lesa GRAM(s) Oral at bedtime  Cont haloperidol    Injectable 1 lesa GRAM(s) Intramuscular every 8 hours PRN Agitation  Cont Quetiapine 50 lesa GRAM(s) Oral every 6 hours PRN mild to moderate agitation/insomnia  EKG when pt agreeable without agitation    FOLLOW UP: Effect of medication, EKG results EVENT: QTc monitoring in light of increased Seroquel per Psych rec    BRIEF HPI:74M PMH dementia, DM, and HTN presenting to the ED with AMS admitted for acute encephalopathy 2/2 worsening dementia vs NPH    Vital Signs Last 24 Hrs  T(C): 36.7 (31 May 2024 20:09), Max: 36.7 (31 May 2024 20:09)  T(F): 98.1 (31 May 2024 20:09), Max: 98.1 (31 May 2024 20:09)  HR: 59 (31 May 2024 20:09) (59 - 99)  BP: 94/54 (31 May 2024 20:09) (94/54 - 135/73)  BP(mean): --  RR: 16 (31 May 2024 20:09) (16 - 19)  SpO2: 95% (31 May 2024 20:09) (95% - 97%)    Parameters below as of 31 May 2024 20:09  Patient On (Oxygen Delivery Method): room air    EKG  Refused EKG today    5/30/24 11:26  NSR  Vent rate 71 bpm  NJ interval 148 ms  QRS duration 80 ms QT/QTc 392/425 ms     FOCUSED:  NEURO: Awake, combative on and off  RESP: Inc w agitation    PROBLEM: Agitation probably due to dementia  PLAN  Cont enhanced observation  Cont mirtazapine 7.5 lesa GRAM(s) Oral at bedtime  Cont Quetiapine 50 lesa GRAM(s) Oral at bedtime  Cont haloperidol    Injectable 1 lesa GRAM(s) Intramuscular every 8 hours PRN Agitation  Cont Quetiapine 50 lesa GRAM(s) Oral every 6 hours PRN mild to moderate agitation/insomnia  EKG when pt agreeable without agitation    FOLLOW UP: Effect of medication, EKG results

## 2024-05-31 NOTE — PROGRESS NOTE ADULT - PROBLEM SELECTOR PLAN 4
h/o DM   on lantus 40 qhs and   blood glucose 363 on admission bmp  A1C 11.7  Now Improved control  consistent carb diet  Continue  lantus 20 at bedtime and lispro 5 premeals with ISS  Continue glucose monitoring ac/HS and coverage with Admelog   Consult Endocrinology h/o dementia  unknown home meds  Continue mirtazapine  Continue seroquel  Haldol prn mild to moderate agitation/insomnia  Maintain safety measures  Supportive measures.   Psyche following

## 2024-05-31 NOTE — PHYSICAL THERAPY INITIAL EVALUATION ADULT - GAIT DEVIATIONS NOTED, PT EVAL
decreased yelena/increased time in double stance/decreased velocity of limb motion/decreased step length/decreased stride length/decreased weight-shifting ability

## 2024-05-31 NOTE — PHYSICAL THERAPY INITIAL EVALUATION ADULT - ACTIVE RANGE OF MOTION EXAMINATION, REHAB EVAL
except b/l hips ~1/3 range/bilateral upper extremity Active ROM was WFL (within functional limits)/bilateral  lower extremity Active ROM was WFL (within functional limits)

## 2024-05-31 NOTE — BH CONSULTATION LIAISON PROGRESS NOTE - NSBHCHARTREVIEWVS_PSY_A_CORE FT
Vital Signs Last 24 Hrs  T(C): 36.6 (31 May 2024 12:17), Max: 36.6 (31 May 2024 12:17)  T(F): 97.9 (31 May 2024 12:17), Max: 97.9 (31 May 2024 12:17)  HR: 73 (31 May 2024 12:17) (73 - 99)  BP: 117/67 (31 May 2024 12:17) (117/67 - 180/77)  BP(mean): --  RR: 17 (31 May 2024 12:17) (17 - 19)  SpO2: 97% (31 May 2024 12:17) (96% - 98%)    Parameters below as of 31 May 2024 12:17  Patient On (Oxygen Delivery Method): room air

## 2024-05-31 NOTE — PHYSICAL THERAPY INITIAL EVALUATION ADULT - LEVEL OF INDEPENDENCE: STAIR NEGOTIATION, REHAB EVAL
Patient c/o generalized abdominal pain since yesterday, had nausea and vomiting. Tachycardiac in triage, EKG done.
may attempt at future session

## 2024-05-31 NOTE — PHYSICAL THERAPY INITIAL EVALUATION ADULT - IMPAIRMENTS FOUND, PT EVAL
cognitive impairment/gait, locomotion, and balance/muscle strength/poor safety awareness/posture/ROM/tone

## 2024-05-31 NOTE — PHYSICAL THERAPY INITIAL EVALUATION ADULT - DIAGNOSIS, PT EVAL
(ICF Model) Pt. present w/deficits in Body Structures/Function (Impairments), incl: Strength, Balance, tone, cognition, motor control, leading to deficits in performing the below noted Activities (Limitations).

## 2024-05-31 NOTE — PHYSICAL THERAPY INITIAL EVALUATION ADULT - GENERAL OBSERVATIONS, REHAB EVAL
Consult received, chart reviewed. Patient received supine in bed, NAD. Patient agreed to EVALUATION from Physical Therapist. Keeps eyes close most of time, eye redenss b/l when opens them (RN made aware)

## 2024-05-31 NOTE — PHYSICAL THERAPY INITIAL EVALUATION ADULT - PATIENT PROFILE REVIEW, REHAB EVAL
including labs and imaging. MRI read pending and NP cleared pt. for eval prior to formal reading. NP also advised pt. not on bedrest/yes

## 2024-05-31 NOTE — PHYSICAL THERAPY INITIAL EVALUATION ADULT - PERTINENT HX OF CURRENT PROBLEM, REHAB EVAL
r/o NPH, Dementia, impaired mobility. Additional past medical history as detailed below by medical team.

## 2024-05-31 NOTE — PROGRESS NOTE ADULT - SUBJECTIVE AND OBJECTIVE BOX
NP Note discussed with primary attending.      Patient is a 74y old  Male who presents with a chief complaint of AMS (31 May 2024 10:06)      INTERVAL HPI/OVERNIGHT EVENTS:     MEDICATIONS  (STANDING):  amLODIPine   Tablet 5 milliGRAM(s) Oral every 24 hours  atorvastatin 80 milliGRAM(s) Oral at bedtime  carvedilol 12.5 milliGRAM(s) Oral every 12 hours  gabapentin 300 milliGRAM(s) Oral daily  heparin   Injectable 5000 Unit(s) SubCutaneous every 8 hours  insulin glargine Injectable (LANTUS) 20 Unit(s) SubCutaneous at bedtime  insulin lispro (ADMELOG) corrective regimen sliding scale   SubCutaneous at bedtime  insulin lispro (ADMELOG) corrective regimen sliding scale   SubCutaneous three times a day before meals  insulin lispro Injectable (ADMELOG) 5 Unit(s) SubCutaneous three times a day before meals  latanoprost 0.005% Ophthalmic Solution 1 Drop(s) Both EYES at bedtime  lisinopril 20 milliGRAM(s) Oral daily  mirtazapine 7.5 milliGRAM(s) Oral at bedtime  pantoprazole    Tablet 40 milliGRAM(s) Oral before breakfast  QUEtiapine 25 milliGRAM(s) Oral at bedtime    MEDICATIONS  (PRN):  acetaminophen     Tablet .. 650 milliGRAM(s) Oral every 6 hours PRN Temp greater or equal to 38C (100.4F), Mild Pain (1 - 3)  aluminum hydroxide/magnesium hydroxide/simethicone Suspension 30 milliLiter(s) Oral every 4 hours PRN Dyspepsia  haloperidol    Injectable 1 milliGRAM(s) IntraMuscular every 8 hours PRN Agitation  melatonin 3 milliGRAM(s) Oral at bedtime PRN Insomnia  ondansetron Injectable 4 milliGRAM(s) IV Push every 8 hours PRN Nausea and/or Vomiting  QUEtiapine 25 milliGRAM(s) Oral every 6 hours PRN agitation      __________________________________________________  REVIEW OF SYSTEMS:    CONSTITUTIONAL: No fever,   EYES: no acute visual disturbances  NECK: No pain or stiffness  RESPIRATORY: No cough; No shortness of breath  CARDIOVASCULAR: No chest pain, no palpitations  GASTROINTESTINAL: No pain. No nausea or vomiting; No diarrhea   NEUROLOGICAL: No headache or numbness, no tremors  MUSCULOSKELETAL: No joint pain, no muscle pain  GENITOURINARY: no dysuria, no frequency, no hesitancy  PSYCHIATRY: no depression , no anxiety  ALL OTHER  ROS negative        Vital Signs Last 24 Hrs  T(C): 36.6 (31 May 2024 12:17), Max: 36.6 (31 May 2024 12:17)  T(F): 97.9 (31 May 2024 12:17), Max: 97.9 (31 May 2024 12:17)  HR: 73 (31 May 2024 12:17) (73 - 99)  BP: 117/67 (31 May 2024 12:17) (117/67 - 180/77)  BP(mean): --  RR: 17 (31 May 2024 12:17) (17 - 19)  SpO2: 97% (31 May 2024 12:17) (96% - 98%)    Parameters below as of 31 May 2024 12:17  Patient On (Oxygen Delivery Method): room air        ________________________________________________  PHYSICAL EXAM:  GENERAL: NAD  HEENT: Normocephalic;  conjunctivae and sclerae clear; moist mucous membranes;   NECK : supple  CHEST/LUNG: Clear to auscultation bilaterally with good air entry   HEART: S1 S2  regular; no murmurs, gallops or rubs  ABDOMEN: Soft, Nontender, Nondistended; Bowel sounds present  EXTREMITIES: no cyanosis; no edema; no calf tenderness  SKIN: warm and dry; no rash  NERVOUS SYSTEM:  Awake and alert; no new deficits    _________________________________________________  LABS:                        14.0   7.53  )-----------( 237      ( 31 May 2024 06:10 )             43.5     05-31    142  |  112<H>  |  23<H>  ----------------------------<  179<H>  3.8   |  17<L>  |  1.18    Ca    9.2      31 May 2024 06:10  Phos  2.9     05-31  Mg     2.2     05-31    TPro  7.7  /  Alb  3.6  /  TBili  0.4  /  DBili  x   /  AST  16  /  ALT  25  /  AlkPhos  100  05-30      Urinalysis Basic - ( 31 May 2024 06:10 )    Color: x / Appearance: x / SG: x / pH: x  Gluc: 179 mg/dL / Ketone: x  / Bili: x / Urobili: x   Blood: x / Protein: x / Nitrite: x   Leuk Esterase: x / RBC: x / WBC x   Sq Epi: x / Non Sq Epi: x / Bacteria: x      CAPILLARY BLOOD GLUCOSE      POCT Blood Glucose.: 158 mg/dL (31 May 2024 11:40)  POCT Blood Glucose.: 146 mg/dL (31 May 2024 08:14)  POCT Blood Glucose.: 183 mg/dL (30 May 2024 22:10)  POCT Blood Glucose.: 131 mg/dL (30 May 2024 17:11)        RADIOLOGY & ADDITIONAL TESTS:        Consultant(s) Notes Reviewed:   YES/ No    Care Discussed with Consultants :     Plan of care was discussed with patient and /or primary care giver; all questions and concerns were addressed and care was aligned with patient's wishes.     NP Note discussed with primary attending.      Patient is a 74y old  Male who presents with a chief complaint of AMS (31 May 2024 10:06)      INTERVAL HPI/OVERNIGHT EVENTS: Agitated overnight. s/p Haldol 1mg IM .   Pt seen and examined this morning. Pt calm in bed, keeps eyes closed, answers questions , oriented to self. Does not follow commands.  Moving all extremities.     MEDICATIONS  (STANDING):  amLODIPine   Tablet 5 milliGRAM(s) Oral every 24 hours  atorvastatin 80 milliGRAM(s) Oral at bedtime  carvedilol 12.5 milliGRAM(s) Oral every 12 hours  gabapentin 300 milliGRAM(s) Oral daily  heparin   Injectable 5000 Unit(s) SubCutaneous every 8 hours  insulin glargine Injectable (LANTUS) 20 Unit(s) SubCutaneous at bedtime  insulin lispro (ADMELOG) corrective regimen sliding scale   SubCutaneous at bedtime  insulin lispro (ADMELOG) corrective regimen sliding scale   SubCutaneous three times a day before meals  insulin lispro Injectable (ADMELOG) 5 Unit(s) SubCutaneous three times a day before meals  latanoprost 0.005% Ophthalmic Solution 1 Drop(s) Both EYES at bedtime  lisinopril 20 milliGRAM(s) Oral daily  mirtazapine 7.5 milliGRAM(s) Oral at bedtime  pantoprazole    Tablet 40 milliGRAM(s) Oral before breakfast  QUEtiapine 25 milliGRAM(s) Oral at bedtime    MEDICATIONS  (PRN):  acetaminophen     Tablet .. 650 milliGRAM(s) Oral every 6 hours PRN Temp greater or equal to 38C (100.4F), Mild Pain (1 - 3)  aluminum hydroxide/magnesium hydroxide/simethicone Suspension 30 milliLiter(s) Oral every 4 hours PRN Dyspepsia  haloperidol    Injectable 1 milliGRAM(s) IntraMuscular every 8 hours PRN Agitation  melatonin 3 milliGRAM(s) Oral at bedtime PRN Insomnia  ondansetron Injectable 4 milliGRAM(s) IV Push every 8 hours PRN Nausea and/or Vomiting  QUEtiapine 25 milliGRAM(s) Oral every 6 hours PRN agitation      __________________________________________________  REVIEW OF SYSTEMS:    CONSTITUTIONAL: No fever,   EYES: no acute visual disturbances  NECK: No pain or stiffness  RESPIRATORY: No cough; No shortness of breath  CARDIOVASCULAR: No chest pain, no palpitations  GASTROINTESTINAL: No pain. No nausea or vomiting; No diarrhea   NEUROLOGICAL: No headache or numbness, no tremors  MUSCULOSKELETAL: No joint pain, no muscle pain  GENITOURINARY: no dysuria, no frequency, no hesitancy  PSYCHIATRY: no depression , no anxiety  ALL OTHER  ROS negative        Vital Signs Last 24 Hrs  T(C): 36.6 (31 May 2024 12:17), Max: 36.6 (31 May 2024 12:17)  T(F): 97.9 (31 May 2024 12:17), Max: 97.9 (31 May 2024 12:17)  HR: 73 (31 May 2024 12:17) (73 - 99)  BP: 117/67 (31 May 2024 12:17) (117/67 - 180/77)  BP(mean): --  RR: 17 (31 May 2024 12:17) (17 - 19)  SpO2: 97% (31 May 2024 12:17) (96% - 98%)    Parameters below as of 31 May 2024 12:17  Patient On (Oxygen Delivery Method): room air        ________________________________________________  PHYSICAL EXAM:  GENERAL: NAD, calm.   HEENT: Normocephalic;  conjunctivae and sclerae reddened. No drainage.    NECK : supple  CHEST/LUNG: Clear to auscultation bilaterally with good air entry   HEART: S1 S2  regular; no murmurs, gallops or rubs  ABDOMEN: Soft, Nontender, Nondistended; Bowel sounds present  EXTREMITIES: no cyanosis; no edema; no calf tenderness  SKIN: warm and dry;  NERVOUS SYSTEM:  Awake and alert; no new deficits    _________________________________________________  LABS:                        14.0   7.53  )-----------( 237      ( 31 May 2024 06:10 )             43.5     05-31    142  |  112<H>  |  23<H>  ----------------------------<  179<H>  3.8   |  17<L>  |  1.18    Ca    9.2      31 May 2024 06:10  Phos  2.9     05-31  Mg     2.2     05-31    TPro  7.7  /  Alb  3.6  /  TBili  0.4  /  DBili  x   /  AST  16  /  ALT  25  /  AlkPhos  100  05-30      Urinalysis Basic - ( 31 May 2024 06:10 )    Color: x / Appearance: x / SG: x / pH: x  Gluc: 179 mg/dL / Ketone: x  / Bili: x / Urobili: x   Blood: x / Protein: x / Nitrite: x   Leuk Esterase: x / RBC: x / WBC x   Sq Epi: x / Non Sq Epi: x / Bacteria: x      CAPILLARY BLOOD GLUCOSE      POCT Blood Glucose.: 158 mg/dL (31 May 2024 11:40)  POCT Blood Glucose.: 146 mg/dL (31 May 2024 08:14)  POCT Blood Glucose.: 183 mg/dL (30 May 2024 22:10)  POCT Blood Glucose.: 131 mg/dL (30 May 2024 17:11)        RADIOLOGY & ADDITIONAL TESTS:  < from: CT Head No Cont (05.30.24 @ 07:20) >  ACC: [id number] EXAM:  CT BRAIN   ORDERED BY: KENAN QUEZADA     PROCEDURE DATE:  05/30/2024          INTERPRETATION:  INDICATIONS:  Alteration of  Consciousness  .    TECHNIQUE:  Serial axial images were obtained from the skull base to the   vertexwithout intravenous contrast. Coronal and sagittal reformatted   images were obtained.    COMPARISON EXAMINATION: None.    FINDINGS: The study is degraded by motion artifact    VENTRICLES AND SULCI:  There is ventricular prominence with rounding of   the frontal horns and prominence of the third ventricle. There is sulcal   effacement toward the vertex and a narrow callosal angle. Findings raise   the possibility of normal pressure hydrocephalus. There is a cavum septum   pellucidum and vergae.  INTRA-AXIAL:  No mass effect or hemorrhage identified given scan   limitations.  EXTRA-AXIAL:  No mass or collection is seen.  VISUALIZED SINUSES:  Mild mucosal thickening with left sphenoid foamy   secretions  VISUALIZED MASTOIDS:  Clear.  CALVARIUM: Normal.  MISCELLANEOUS:  Scalp thickening is seen in the left occipital region.   There is a questionable soft tissue defect within the left frontal scalp.  Right-sided glaucoma drainage device in place.    IMPRESSION:  Motion limited exam.    Ventricular prominence with findings suggesting normal pressure   hydrocephalus.    No mass effect or hemorrhage identified.    Left occipital scalp soft tissue thickening for which clinical   correlation is advised.    < end of copied text >        Consultant(s) Notes Reviewed:   YES    Care Discussed with Consultants :     Plan of care was discussed with patient and /or primary care giver; all questions and concerns were addressed and care was aligned with patient's wishes.

## 2024-05-31 NOTE — CHART NOTE - NSCHARTNOTEFT_GEN_A_CORE
Contacted pt's emergency contact/daughter Li Amato and updated on pt's clinical condition and plan of care. PT recs [facility]. MRI results pending. Neuro and Psyche  following.   All questions and concerns addressed.     Mary Sheth NP Medicine

## 2024-05-31 NOTE — PROGRESS NOTE ADULT - PROBLEM SELECTOR PLAN 2
SCr 1.56, unknown baseline  s/p 1L IVF in ED   Resolved.  Monitor BMP Start erythromycin ophthalmic oint four times daily for 5 days.

## 2024-05-31 NOTE — BH CONSULTATION LIAISON PROGRESS NOTE - CURRENT MEDICATION
MEDICATIONS  (STANDING):  amLODIPine   Tablet 5 milliGRAM(s) Oral every 24 hours  atorvastatin 80 milliGRAM(s) Oral at bedtime  carvedilol 12.5 milliGRAM(s) Oral every 12 hours  gabapentin 300 milliGRAM(s) Oral daily  heparin   Injectable 5000 Unit(s) SubCutaneous every 8 hours  insulin glargine Injectable (LANTUS) 20 Unit(s) SubCutaneous at bedtime  insulin lispro (ADMELOG) corrective regimen sliding scale   SubCutaneous at bedtime  insulin lispro (ADMELOG) corrective regimen sliding scale   SubCutaneous three times a day before meals  insulin lispro Injectable (ADMELOG) 5 Unit(s) SubCutaneous three times a day before meals  latanoprost 0.005% Ophthalmic Solution 1 Drop(s) Both EYES at bedtime  lisinopril 20 milliGRAM(s) Oral daily  mirtazapine 7.5 milliGRAM(s) Oral at bedtime  pantoprazole    Tablet 40 milliGRAM(s) Oral before breakfast  QUEtiapine 25 milliGRAM(s) Oral at bedtime    MEDICATIONS  (PRN):  acetaminophen     Tablet .. 650 milliGRAM(s) Oral every 6 hours PRN Temp greater or equal to 38C (100.4F), Mild Pain (1 - 3)  aluminum hydroxide/magnesium hydroxide/simethicone Suspension 30 milliLiter(s) Oral every 4 hours PRN Dyspepsia  haloperidol    Injectable 1 milliGRAM(s) IntraMuscular every 8 hours PRN Agitation  melatonin 3 milliGRAM(s) Oral at bedtime PRN Insomnia  ondansetron Injectable 4 milliGRAM(s) IV Push every 8 hours PRN Nausea and/or Vomiting  QUEtiapine 25 milliGRAM(s) Oral every 6 hours PRN agitation

## 2024-05-31 NOTE — BH CONSULTATION LIAISON PROGRESS NOTE - NSBHASSESSMENTFT_PSY_ALL_CORE
73 y/o M with a hx of dementia, HTN, DM2, and blindness, who is admitted due to aggression and behavioral disturbances at home. Followed for the same reason. Patient's presentation appears to be consistent with a major neurocognitive disorder like an advancing dementia with behavioral disturbances. He required a PRN of medication overnight due to agitation, will suggest optimization of standing Seroquel.    -Consider increasing Seroquel to 50 mg PO @ 8-9PM  -Increase PRN's to: Seroquel 50 mg PO q 6 hrs PRN for mild to moderate agitation/insomnia                               Haldol 1 mg IM q 8 hrs PRN for severe agitation  -No need for an inpatient psychiatric admission or 1:1; might need EO as per primary team  -Check an EKG; monitor his QTc  -Neurology f/u  -SW f/u  -Case discussed with the primary team  -Will follow as needed

## 2024-05-31 NOTE — PROGRESS NOTE ADULT - PROBLEM SELECTOR PLAN 6
DVT ppx: Lovenox   GI ppx: PPI h/o HTN  /81 on admission  on lisinopril and amlodipine at home  Improved control   will continue home meds  monitor BP

## 2024-05-31 NOTE — PHYSICAL THERAPY INITIAL EVALUATION ADULT - MANUAL MUSCLE TESTING RESULTS, REHAB EVAL
Limited assessment secondary to impaired ability to follow commands: BUE at least 4/5, hips 2+/5, knees 4/5. Ankles at least 3/5 functionally

## 2024-05-31 NOTE — PROGRESS NOTE ADULT - SUBJECTIVE AND OBJECTIVE BOX
Patient is a 74y old  Male who presents with a chief complaint of AMS (30 May 2024 17:40)    PATIENT IS SEEN AND EXAMINED IN MEDICAL FLOOR.  NGT [    ]    SERGIO [   ]      GT [   ]    ALLERGIES:  No Known Allergies      Daily Height in cm: 175.26 (30 May 2024 12:31)    Daily     VITALS:    Vital Signs Last 24 Hrs  T(C): 36.4 (31 May 2024 05:46), Max: 36.4 (30 May 2024 12:31)  T(F): 97.5 (31 May 2024 05:46), Max: 97.5 (30 May 2024 12:31)  HR: 75 (31 May 2024 10:00) (69 - 99)  BP: 125/75 (31 May 2024 10:00) (120/64 - 180/77)  BP(mean): --  RR: 19 (31 May 2024 05:46) (18 - 19)  SpO2: 96% (31 May 2024 10:00) (96% - 98%)    Parameters below as of 31 May 2024 10:00  Patient On (Oxygen Delivery Method): room air        LABS:    CBC Full  -  ( 31 May 2024 06:10 )  WBC Count : 7.53 K/uL  RBC Count : 4.91 M/uL  Hemoglobin : 14.0 g/dL  Hematocrit : 43.5 %  Platelet Count - Automated : 237 K/uL  Mean Cell Volume : 88.6 fl  Mean Cell Hemoglobin : 28.5 pg  Mean Cell Hemoglobin Concentration : 32.2 gm/dL  Auto Neutrophil # : x  Auto Lymphocyte # : x  Auto Monocyte # : x  Auto Eosinophil # : x  Auto Basophil # : x  Auto Neutrophil % : x  Auto Lymphocyte % : x  Auto Monocyte % : x  Auto Eosinophil % : x  Auto Basophil % : x      05-31    142  |  112<H>  |  23<H>  ----------------------------<  179<H>  3.8   |  17<L>  |  1.18    Ca    9.2      31 May 2024 06:10  Phos  2.9     05-31  Mg     2.2     05-31    TPro  7.7  /  Alb  3.6  /  TBili  0.4  /  DBili  x   /  AST  16  /  ALT  25  /  AlkPhos  100  05-30    CAPILLARY BLOOD GLUCOSE      POCT Blood Glucose.: 146 mg/dL (31 May 2024 08:14)  POCT Blood Glucose.: 183 mg/dL (30 May 2024 22:10)  POCT Blood Glucose.: 131 mg/dL (30 May 2024 17:11)  POCT Blood Glucose.: 255 mg/dL (30 May 2024 10:23)        LIVER FUNCTIONS - ( 30 May 2024 06:25 )  Alb: 3.6 g/dL / Pro: 7.7 g/dL / ALK PHOS: 100 U/L / ALT: 25 U/L DA / AST: 16 U/L / GGT: x           Creatinine Trend: 1.18<--, 1.56<--  I&O's Summary              MEDICATIONS:    MEDICATIONS  (STANDING):  amLODIPine   Tablet 5 milliGRAM(s) Oral every 24 hours  atorvastatin 80 milliGRAM(s) Oral at bedtime  carvedilol 12.5 milliGRAM(s) Oral every 12 hours  gabapentin 300 milliGRAM(s) Oral daily  heparin   Injectable 5000 Unit(s) SubCutaneous every 8 hours  insulin glargine Injectable (LANTUS) 20 Unit(s) SubCutaneous at bedtime  insulin lispro (ADMELOG) corrective regimen sliding scale   SubCutaneous at bedtime  insulin lispro (ADMELOG) corrective regimen sliding scale   SubCutaneous three times a day before meals  insulin lispro Injectable (ADMELOG) 5 Unit(s) SubCutaneous three times a day before meals  latanoprost 0.005% Ophthalmic Solution 1 Drop(s) Both EYES at bedtime  lisinopril 20 milliGRAM(s) Oral daily  mirtazapine 7.5 milliGRAM(s) Oral at bedtime  pantoprazole    Tablet 40 milliGRAM(s) Oral before breakfast  QUEtiapine 25 milliGRAM(s) Oral at bedtime      MEDICATIONS  (PRN):  acetaminophen     Tablet .. 650 milliGRAM(s) Oral every 6 hours PRN Temp greater or equal to 38C (100.4F), Mild Pain (1 - 3)  aluminum hydroxide/magnesium hydroxide/simethicone Suspension 30 milliLiter(s) Oral every 4 hours PRN Dyspepsia  haloperidol    Injectable 1 milliGRAM(s) IntraMuscular every 8 hours PRN Agitation  melatonin 3 milliGRAM(s) Oral at bedtime PRN Insomnia  ondansetron Injectable 4 milliGRAM(s) IV Push every 8 hours PRN Nausea and/or Vomiting  QUEtiapine 25 milliGRAM(s) Oral every 6 hours PRN agitation      REVIEW OF SYSTEMS:                           ALL ROS DONE [ X   ]    CONSTITUTIONAL:  LETHARGIC [   ], FEVER [   ], UNRESPONSIVE [   ]  CVS:  CP  [   ], SOB, [   ], PALPITATIONS [   ], DIZZYNESS [   ]  RS: COUGH [   ], SPUTUM [   ]  GI: ABDOMINAL PAIN [   ], NAUSEA [   ], VOMITINGS [   ], DIARRHEA [   ], CONSTIPATION [   ]  :  DYSURIA [   ], NOCTURIA [   ], INCREASED FREQUENCY [   ], DRIBLING [   ],  SKELETAL: PAINFUL JOINTS [   ], SWOLLEN JOINTS [   ], NECK ACHE [   ], LOW BACK ACHE [   ],  SKIN : ULCERS [   ], RASH [   ], ITCHING [   ]  CNS: HEAD ACHE [   ], DOUBLE VISION [   ], BLURRED VISION [   ], AMS / CONFUSION [   ], SEIZURES [   ], WEAKNESS [   ],TINGLING / NUMBNESS [   ]    PHYSICAL EXAMINATION:  GENERAL APPEARANCE: NO DISTRESS  HEENT:  NO PALLOR, NO  JVD,  NO   NODES, NECK SUPPLE  CVS: S1 +, S2 +,   RS: AEEB,  OCCASIONAL  RALES +,   NO RONCHI  ABD: SOFT, NT, NO, BS +  EXT: NO PE  SKIN: WARM,   SKELETAL:  ROM ACCEPTABLE  CNS:  AAO X 1-2    RADIOLOGY :    RADIOLOGY AND READINGS REVIEWED    ASSESSMENT :     Altered mental status        PLAN:  HPI:  74M PMH dementia, DM, and HTN presenting to the ED with AMS. Pt seen at bedside AAOX2, states he does not know why he was brought to the hospital, but was complaining of bilateral knee pain. Daughter called for collateral information, reports that for the past week, her father has become more confused and aggressive at home, pulling out electrical wires at home and trying to flush his clothes down the toilet. He has a HHA during the day for 12 hours x 5 days and his granddaughter stays the rest of the night with him, however, he has been becoming more aggressive and cursing out the granddaughter. He has been having urinary and bowel incontinence. He is blind in both eyes and ambulates with a walker however has been losing his balance lately with no falls. He denies any fevers, chills, CP, SOB, N/V/D, abdominal pain, dysuria, numbness/tingling in extremities.      (30 May 2024 10:14)      # ACUTE ENCEPHALOPATHY VS. WORSENING DEMENTIA  # ? VENTRICULOMEGALY  - NOTED CT HEAD  - NOTED UA NEGATIVE  - NOTED UTOX NEGATIVE  - F/U MRI BRAIN W/ CSF FLOW  - PSYCHIATRY CONSULT  - NEUROLOGY CONSULT    # B/L KNEE PAIN - ?S/T OA  - PRN PAIN CONTROL  - F/U XRAYS B /L KNEES    # VITA VS. CKD  - MONITOR CR  - AVOID NEPHROTOXIC AGENTS    # DM   - SSI + FS    # HTN    # VISUALLY IMPAIRED     # GI AND DVT PPX.        Patient is a 74y old  Male who presents with a chief complaint of AMS (30 May 2024 17:40)    PATIENT IS SEEN AND EXAMINED IN MEDICAL FLOOR.      ALLERGIES:  No Known Allergies      Daily Height in cm: 175.26 (30 May 2024 12:31)    Daily     VITALS:    Vital Signs Last 24 Hrs  T(C): 36.4 (31 May 2024 05:46), Max: 36.4 (30 May 2024 12:31)  T(F): 97.5 (31 May 2024 05:46), Max: 97.5 (30 May 2024 12:31)  HR: 75 (31 May 2024 10:00) (69 - 99)  BP: 125/75 (31 May 2024 10:00) (120/64 - 180/77)  BP(mean): --  RR: 19 (31 May 2024 05:46) (18 - 19)  SpO2: 96% (31 May 2024 10:00) (96% - 98%)    Parameters below as of 31 May 2024 10:00  Patient On (Oxygen Delivery Method): room air        LABS:    CBC Full  -  ( 31 May 2024 06:10 )  WBC Count : 7.53 K/uL  RBC Count : 4.91 M/uL  Hemoglobin : 14.0 g/dL  Hematocrit : 43.5 %  Platelet Count - Automated : 237 K/uL  Mean Cell Volume : 88.6 fl  Mean Cell Hemoglobin : 28.5 pg  Mean Cell Hemoglobin Concentration : 32.2 gm/dL  Auto Neutrophil # : x  Auto Lymphocyte # : x  Auto Monocyte # : x  Auto Eosinophil # : x  Auto Basophil # : x  Auto Neutrophil % : x  Auto Lymphocyte % : x  Auto Monocyte % : x  Auto Eosinophil % : x  Auto Basophil % : x      05-31    142  |  112<H>  |  23<H>  ----------------------------<  179<H>  3.8   |  17<L>  |  1.18    Ca    9.2      31 May 2024 06:10  Phos  2.9     05-31  Mg     2.2     05-31    TPro  7.7  /  Alb  3.6  /  TBili  0.4  /  DBili  x   /  AST  16  /  ALT  25  /  AlkPhos  100  05-30    CAPILLARY BLOOD GLUCOSE      POCT Blood Glucose.: 146 mg/dL (31 May 2024 08:14)  POCT Blood Glucose.: 183 mg/dL (30 May 2024 22:10)  POCT Blood Glucose.: 131 mg/dL (30 May 2024 17:11)  POCT Blood Glucose.: 255 mg/dL (30 May 2024 10:23)        LIVER FUNCTIONS - ( 30 May 2024 06:25 )  Alb: 3.6 g/dL / Pro: 7.7 g/dL / ALK PHOS: 100 U/L / ALT: 25 U/L DA / AST: 16 U/L / GGT: x           Creatinine Trend: 1.18<--, 1.56<--  I&O's Summary              MEDICATIONS:    MEDICATIONS  (STANDING):  amLODIPine   Tablet 5 milliGRAM(s) Oral every 24 hours  atorvastatin 80 milliGRAM(s) Oral at bedtime  carvedilol 12.5 milliGRAM(s) Oral every 12 hours  gabapentin 300 milliGRAM(s) Oral daily  heparin   Injectable 5000 Unit(s) SubCutaneous every 8 hours  insulin glargine Injectable (LANTUS) 20 Unit(s) SubCutaneous at bedtime  insulin lispro (ADMELOG) corrective regimen sliding scale   SubCutaneous at bedtime  insulin lispro (ADMELOG) corrective regimen sliding scale   SubCutaneous three times a day before meals  insulin lispro Injectable (ADMELOG) 5 Unit(s) SubCutaneous three times a day before meals  latanoprost 0.005% Ophthalmic Solution 1 Drop(s) Both EYES at bedtime  lisinopril 20 milliGRAM(s) Oral daily  mirtazapine 7.5 milliGRAM(s) Oral at bedtime  pantoprazole    Tablet 40 milliGRAM(s) Oral before breakfast  QUEtiapine 25 milliGRAM(s) Oral at bedtime      MEDICATIONS  (PRN):  acetaminophen     Tablet .. 650 milliGRAM(s) Oral every 6 hours PRN Temp greater or equal to 38C (100.4F), Mild Pain (1 - 3)  aluminum hydroxide/magnesium hydroxide/simethicone Suspension 30 milliLiter(s) Oral every 4 hours PRN Dyspepsia  haloperidol    Injectable 1 milliGRAM(s) IntraMuscular every 8 hours PRN Agitation  melatonin 3 milliGRAM(s) Oral at bedtime PRN Insomnia  ondansetron Injectable 4 milliGRAM(s) IV Push every 8 hours PRN Nausea and/or Vomiting  QUEtiapine 25 milliGRAM(s) Oral every 6 hours PRN agitation      REVIEW OF SYSTEMS:                           ALL ROS DONE [ X   ]    CONSTITUTIONAL:  LETHARGIC [   ], FEVER [   ], UNRESPONSIVE [   ]  CVS:  CP  [   ], SOB, [   ], PALPITATIONS [   ], DIZZYNESS [   ]  RS: COUGH [   ], SPUTUM [   ]  GI: ABDOMINAL PAIN [   ], NAUSEA [   ], VOMITINGS [   ], DIARRHEA [   ], CONSTIPATION [   ]  :  DYSURIA [   ], NOCTURIA [   ], INCREASED FREQUENCY [   ], DRIBLING [   ],  SKELETAL: PAINFUL JOINTS [   ], SWOLLEN JOINTS [   ], NECK ACHE [   ], LOW BACK ACHE [   ],  SKIN : ULCERS [   ], RASH [   ], ITCHING [   ]  CNS: HEAD ACHE [   ], DOUBLE VISION [   ], BLURRED VISION [   ], AMS / CONFUSION [   ], SEIZURES [   ], WEAKNESS [   ],TINGLING / NUMBNESS [   ]    PHYSICAL EXAMINATION:  GENERAL APPEARANCE: NO DISTRESS  HEENT:  NO PALLOR, NO  JVD,  NO   NODES, NECK SUPPLE  CVS: S1 +, S2 +,   RS: AEEB,  OCCASIONAL  RALES +,   NO RONCHI  ABD: SOFT, NT, NO, BS +  EXT: NO PE  SKIN: WARM,   SKELETAL:  ROM ACCEPTABLE  CNS:  AAO X 1-2    RADIOLOGY :    RADIOLOGY AND READINGS REVIEWED    ASSESSMENT :     Altered mental status        PLAN:  HPI:  74M PMH dementia, DM, and HTN presenting to the ED with AMS. Pt seen at bedside AAOX2, states he does not know why he was brought to the hospital, but was complaining of bilateral knee pain. Daughter called for collateral information, reports that for the past week, her father has become more confused and aggressive at home, pulling out electrical wires at home and trying to flush his clothes down the toilet. He has a HHA during the day for 12 hours x 5 days and his granddaughter stays the rest of the night with him, however, he has been becoming more aggressive and cursing out the granddaughter. He has been having urinary and bowel incontinence. He is blind in both eyes and ambulates with a walker however has been losing his balance lately with no falls. He denies any fevers, chills, CP, SOB, N/V/D, abdominal pain, dysuria, numbness/tingling in extremities.      (30 May 2024 10:14)    # [5/31] CASE DW PATIENT'S DAUGHTER VELMA PONCE @ [phone] . PER DAUGHTER PATIENT HAS HAD WORSENING DEMENTIA AND HAS NOTED MOOD CHANGES. SHE ALSO CONVEYS HE IS NONCOMPLIANT WITH MEDICATION DESPITE SUPPORTIVE MEASURES. SHE CONVEYS THAT FAMILY IS AGREEABLE FOR MUSHTAQ     CM TEAM TO COORDINATE MUSHTAQ PLACEMENT    # LIKELY WORSENING DEMENTIA  # ? VENTRICULOMEGALY  - NOTED CT HEAD  - NOTED UA NEGATIVE  - NOTED UTOX NEGATIVE  - F/U MRI BRAIN W/ CSF FLOW  - PSYCHIATRY CONSULT  - NEUROLOGY CONSULT    - ON REMERON  - ON SEROQUEL [UPTITRATING] PER PSYCH    # B/L KNEE PAIN - ?S/T OA  - PRN PAIN CONTROL  - F/U XRAYS B /L KNEES    # VITA VS. CKD - IMPROVED  - MONITOR CR  - AVOID NEPHROTOXIC AGENTS    # DM  - HBA1C - 11.7   - SSI + FS    # HTN    # VISUALLY IMPAIRED     # GI AND DVT PPX.

## 2024-05-31 NOTE — PHYSICAL THERAPY INITIAL EVALUATION ADULT - ADL SKILLS, REHAB EVAL
He has a HHA during the day for 12 hours x 5 days and his granddaughter stays the rest of the night with him,/needs device and assist

## 2024-05-31 NOTE — PROGRESS NOTE ADULT - PROBLEM SELECTOR PLAN 3
h/o dementia  unknown home meds  Continue mirtazapine  Continue seroquel  Haldol prn mild to moderate agitation/insomnia  Maintain safety measures  Supportive measures.   Psyche following SCr 1.56, unknown baseline  s/p 1L IVF in ED   Resolved.  Monitor BMP

## 2024-05-31 NOTE — CHART NOTE - NSCHARTNOTEFT_GEN_A_CORE
EVENT: Called to assess pt for poor safety awareness attempting to get OOB, visually impaired, fall risk    BRIEF HPI: 74M PMH dementia, DM, and HTN presenting to the ED with AMS admitted for acute encephalopathy 2/2 worsening dementia vs NPH  Neuro and behavioral health on board.    Vital Signs Last 24 Hrs  T(C): 36.3 (05-30-24 @ 20:01), Max: 37.1 (05-30-24 @ 06:09)  T(F): 97.4 (05-30-24 @ 20:01), Max: 98.7 (05-30-24 @ 06:09)  HR: 76 (05-30-24 @ 20:01) (69 - 91)  BP: 180/77 (05-30-24 @ 20:01) (145/73 - 180/77)  BP(mean): --  RR: 18 (05-30-24 @ 20:01) (18 - 19)  SpO2: 98% (05-30-24 @ 20:01) (96% - 98%)    FOCUSED PE  NEURO Awake, sitting up in bed, attempting OOB, constant redirecting    PLAN  Haloperidol injectable 1 lesa GRAM(s) Intramuscular every 8 hours PRN Agitation per  liaison rec  Cont Quetiapine 25 lesa GRAM(s) Oral at bedtime  Cont Quetiapine 25 lesa GRAM(s) Oral every 6 hours PRN agitation  Enhanced observation for pt's safety    FOLLOW UP: effect of medication EVENT: Called to assess pt for poor safety awareness attempting to get OOB, visually impaired, fall risk    BRIEF HPI: 74M PMH dementia, DM, and HTN presenting to the ED with AMS admitted for acute encephalopathy 2/2 worsening dementia vs NPH. Neuro and behavioral health on board.    Vital Signs Last 24 Hrs  T(C): 36.3 (05-30-24 @ 20:01), Max: 37.1 (05-30-24 @ 06:09)  T(F): 97.4 (05-30-24 @ 20:01), Max: 98.7 (05-30-24 @ 06:09)  HR: 76 (05-30-24 @ 20:01) (69 - 91)  BP: 180/77 (05-30-24 @ 20:01) (145/73 - 180/77)  BP(mean): --  RR: 18 (05-30-24 @ 20:01) (18 - 19)  SpO2: 98% (05-30-24 @ 20:01) (96% - 98%)    FOCUSED PE  NEURO Awake, sitting up in bed, attempting OOB, constant redirecting    PLAN  Haloperidol injectable 1 lesa GRAM(s) Intramuscular every 8 hours PRN Agitation per  liaison rec  Cont Quetiapine 25 lesa GRAM(s) Oral at bedtime  Cont Quetiapine 25 lesa GRAM(s) Oral every 6 hours PRN agitation  Enhanced observation for pt's safety    FOLLOW UP: effect of medication

## 2024-05-31 NOTE — PROGRESS NOTE ADULT - PROBLEM SELECTOR PLAN 5
h/o HTN  /81 on admission  on lisinopril and amlodipine at home  Improved control   will continue home meds  monitor BP h/o DM   on lantus 40 qhs and   blood glucose 363 on admission bmp  A1C 11.7  Now Improved control  consistent carb diet  Continue  lantus 20 at bedtime and lispro 5 premeals with ISS  Continue glucose monitoring ac/HS and coverage with Admelog   Consult Endocrinology

## 2024-06-01 LAB
ANION GAP SERPL CALC-SCNC: 8 MMOL/L — SIGNIFICANT CHANGE UP (ref 5–17)
BUN SERPL-MCNC: 32 MG/DL — HIGH (ref 7–18)
CALCIUM SERPL-MCNC: 8.5 MG/DL — SIGNIFICANT CHANGE UP (ref 8.4–10.5)
CHLORIDE SERPL-SCNC: 112 MMOL/L — HIGH (ref 96–108)
CO2 SERPL-SCNC: 23 MMOL/L — SIGNIFICANT CHANGE UP (ref 22–31)
CREAT SERPL-MCNC: 1.51 MG/DL — HIGH (ref 0.5–1.3)
EGFR: 48 ML/MIN/1.73M2 — LOW
GLUCOSE BLDC GLUCOMTR-MCNC: 127 MG/DL — HIGH (ref 70–99)
GLUCOSE BLDC GLUCOMTR-MCNC: 132 MG/DL — HIGH (ref 70–99)
GLUCOSE BLDC GLUCOMTR-MCNC: 138 MG/DL — HIGH (ref 70–99)
GLUCOSE BLDC GLUCOMTR-MCNC: 158 MG/DL — HIGH (ref 70–99)
GLUCOSE SERPL-MCNC: 175 MG/DL — HIGH (ref 70–99)
MAGNESIUM SERPL-MCNC: 2.4 MG/DL — SIGNIFICANT CHANGE UP (ref 1.6–2.6)
POTASSIUM SERPL-MCNC: 3.7 MMOL/L — SIGNIFICANT CHANGE UP (ref 3.5–5.3)
POTASSIUM SERPL-SCNC: 3.7 MMOL/L — SIGNIFICANT CHANGE UP (ref 3.5–5.3)
SODIUM SERPL-SCNC: 143 MMOL/L — SIGNIFICANT CHANGE UP (ref 135–145)

## 2024-06-01 PROCEDURE — 99233 SBSQ HOSP IP/OBS HIGH 50: CPT

## 2024-06-01 RX ADMIN — INSULIN GLARGINE 20 UNIT(S): 100 INJECTION, SOLUTION SUBCUTANEOUS at 21:24

## 2024-06-01 RX ADMIN — Medication 1: at 17:33

## 2024-06-01 RX ADMIN — Medication 5 UNIT(S): at 12:07

## 2024-06-01 RX ADMIN — Medication 5 UNIT(S): at 08:01

## 2024-06-01 RX ADMIN — CARVEDILOL PHOSPHATE 12.5 MILLIGRAM(S): 80 CAPSULE, EXTENDED RELEASE ORAL at 17:34

## 2024-06-01 RX ADMIN — Medication 1 APPLICATION(S): at 05:34

## 2024-06-01 RX ADMIN — Medication 1 APPLICATION(S): at 17:34

## 2024-06-01 RX ADMIN — Medication 5 UNIT(S): at 17:32

## 2024-06-01 RX ADMIN — QUETIAPINE FUMARATE 50 MILLIGRAM(S): 200 TABLET, FILM COATED ORAL at 21:23

## 2024-06-01 RX ADMIN — PANTOPRAZOLE SODIUM 40 MILLIGRAM(S): 20 TABLET, DELAYED RELEASE ORAL at 06:16

## 2024-06-01 RX ADMIN — Medication 1 APPLICATION(S): at 12:08

## 2024-06-01 RX ADMIN — LATANOPROST 1 DROP(S): 0.05 SOLUTION/ DROPS OPHTHALMIC; TOPICAL at 21:24

## 2024-06-01 RX ADMIN — MIRTAZAPINE 7.5 MILLIGRAM(S): 45 TABLET, ORALLY DISINTEGRATING ORAL at 21:24

## 2024-06-01 RX ADMIN — GABAPENTIN 300 MILLIGRAM(S): 400 CAPSULE ORAL at 12:07

## 2024-06-01 RX ADMIN — ENOXAPARIN SODIUM 40 MILLIGRAM(S): 100 INJECTION SUBCUTANEOUS at 21:23

## 2024-06-01 RX ADMIN — AMLODIPINE BESYLATE 5 MILLIGRAM(S): 2.5 TABLET ORAL at 17:35

## 2024-06-01 RX ADMIN — ATORVASTATIN CALCIUM 80 MILLIGRAM(S): 80 TABLET, FILM COATED ORAL at 21:23

## 2024-06-01 NOTE — CHART NOTE - NSCHARTNOTEFT_GEN_A_CORE
EVENT: Nurse reporting pt did not void overnight. BladderScan 800 ml.    BRIEF HPI: 74M PMH dementia, DM, and HTN presenting to the ED with AMS admitted for acute encephalopathy 2/2 worsening dementia vs NPH    OBJECTIVE:  Vital Signs Last 24 Hrs  T(C): 36.4 (01 Jun 2024 05:54), Max: 36.7 (31 May 2024 20:09)  T(F): 97.5 (01 Jun 2024 05:54), Max: 98.1 (31 May 2024 20:09)  HR: 56 (01 Jun 2024 05:54) (56 - 77)  BP: 93/50 (01 Jun 2024 05:54) (93/50 - 125/75)  BP(mean): --  RR: 16 (01 Jun 2024 05:54) (16 - 17)  SpO2: 97% (01 Jun 2024 05:54) (95% - 97%)    Parameters below as of 01 Jun 2024 05:54  Patient On (Oxygen Delivery Method): room air    FOCUSED PHYSICAL EXAM:  ABD: Rounded, softly distended  RESP: Unlabored  CV: S1 S2 regular, nona    LABS:                        14.0   7.53  )-----------( 237      ( 31 May 2024 06:10 )             43.5     06-01    143  |  112<H>  |  32<H>  ----------------------------<  175<H>  3.7   |  23  |  1.51<H>    Ca    8.5      01 Jun 2024 04:52  Phos  2.9     05-31  Mg     2.4     06-01    PROBLEM: Urinary retention  PLAN:   Straight cath now  Monitor pt voiding    FOLLOW UP / RESULT: Repeat bladder scan noon EVENT: Nurse reporting pt did not void overnight. BladderScan 800 ml.    BRIEF HPI: 74M PMH dementia, DM, and HTN presenting to the ED with AMS admitted for acute encephalopathy 2/2 worsening dementia vs NPH    OBJECTIVE:  Vital Signs Last 24 Hrs  T(C): 36.4 (01 Jun 2024 05:54), Max: 36.7 (31 May 2024 20:09)  T(F): 97.5 (01 Jun 2024 05:54), Max: 98.1 (31 May 2024 20:09)  HR: 56 (01 Jun 2024 05:54) (56 - 77)  BP: 93/50 (01 Jun 2024 05:54) (93/50 - 125/75)  BP(mean): --  RR: 16 (01 Jun 2024 05:54) (16 - 17)  SpO2: 97% (01 Jun 2024 05:54) (95% - 97%)    Parameters below as of 01 Jun 2024 05:54  Patient On (Oxygen Delivery Method): room air    FOCUSED PHYSICAL EXAM:  ABD: Rounded, softly distended  RESP: Unlabored  CV: S1 S2 regular, nona    LABS:                        14.0   7.53  )-----------( 237      ( 31 May 2024 06:10 )             43.5     06-01    143  |  112<H>  |  32<H>  ----------------------------<  175<H>  3.7   |  23  |  1.51<H>    Ca    8.5      01 Jun 2024 04:52  Phos  2.9     05-31  Mg     2.4     06-01    PROBLEM: Urinary retention  PLAN:   Straight cath now - output 900 ml reported by nurse  Monitor pt voiding    FOLLOW UP / RESULT: Repeat bladder scan 12 noon

## 2024-06-01 NOTE — PROGRESS NOTE ADULT - SUBJECTIVE AND OBJECTIVE BOX
NEUROLOGY FOLLOW-UP NOTE    NAME:  MAXX PONCE      ASSESSMENT:  74 RHM with chronic b/l blindness and recent decline in cognition and development of behavioral disturbances and insomnia over 6 months, concerning for dementia vs. normal pressure hydrocephalus      RECOMMENDATIONS:    - Maintain Q4H VS & Neurochecks    - Continue Quetiapine 5omg PO QHS to help manage behavioral disturbances and insomnia, as well as an additional Quetiapine 50mg PO Daily PRN Agitation - Uptitrate as per Psychiatry consult    - I have further discussed the risks and benefits of bedside lumbar puncture with the patient's daughter       - Patient daughter does not wish to pursue procedures, including lumbar puncture    - Metabolic and infectious workup and treatment, including management of diabetes, as per primary team    - DVT ppx: SCDs, Heparin          NOTE TO BE COMPLETED - PLEASE REFER TO ABOVE ONLY AND IGNORE INFORMATION BELOW    ******************************    HPI:  74M PMH dementia, DM, and HTN presenting to the ED with AMS. Pt seen at bedside AAOX2, states he does not know why he was brought to the hospital, but was complaining of bilateral knee pain. Daughter called for collateral information, reports that for the past week, her father has become more confused and aggressive at home, pulling out electrical wires at home and trying to flush his clothes down the toilet. He has a HHA during the day for 12 hours x 5 days and his granddaughter stays the rest of the night with him, however, he has been becoming more aggressive and cursing out the granddaughter. He has been having urinary and bowel incontinence. He is blind in both eyes and ambulates with a walker however has been losing his balance lately with no falls. He denies any fevers, chills, CP, SOB, N/V/D, abdominal pain, dysuria, numbness/tingling in extremities.      (30 May 2024 10:14)      NEURO HPI:      INTERVAL HISTORY:      MEDICATIONS:  acetaminophen     Tablet .. 650 milliGRAM(s) Oral every 6 hours PRN  aluminum hydroxide/magnesium hydroxide/simethicone Suspension 30 milliLiter(s) Oral every 4 hours PRN  amLODIPine   Tablet 5 milliGRAM(s) Oral every 24 hours  atorvastatin 80 milliGRAM(s) Oral at bedtime  carvedilol 12.5 milliGRAM(s) Oral every 12 hours  enoxaparin Injectable 40 milliGRAM(s) SubCutaneous every 24 hours  erythromycin   Ointment 1 Application(s) Both EYES four times a day  gabapentin 300 milliGRAM(s) Oral daily  haloperidol    Injectable 1 milliGRAM(s) IntraMuscular every 8 hours PRN  insulin glargine Injectable (LANTUS) 20 Unit(s) SubCutaneous at bedtime  insulin lispro (ADMELOG) corrective regimen sliding scale   SubCutaneous three times a day before meals  insulin lispro (ADMELOG) corrective regimen sliding scale   SubCutaneous at bedtime  insulin lispro Injectable (ADMELOG) 5 Unit(s) SubCutaneous three times a day before meals  latanoprost 0.005% Ophthalmic Solution 1 Drop(s) Both EYES at bedtime  lisinopril 20 milliGRAM(s) Oral daily  melatonin 3 milliGRAM(s) Oral at bedtime PRN  mirtazapine 7.5 milliGRAM(s) Oral at bedtime  ondansetron Injectable 4 milliGRAM(s) IV Push every 8 hours PRN  pantoprazole    Tablet 40 milliGRAM(s) Oral before breakfast  QUEtiapine 50 milliGRAM(s) Oral at bedtime  QUEtiapine 50 milliGRAM(s) Oral every 6 hours PRN      ALLERGIES:  No Known Allergies      REVIEW OF SYSTEMS:  Fourteen systems reviewed and negative except as in HPI / Interval History.          OBJECTIVE:  Vital Signs Last 24 Hrs  T(C): 36.5 (01 Jun 2024 11:39), Max: 36.7 (31 May 2024 20:09)  T(F): 97.7 (01 Jun 2024 11:39), Max: 98.1 (31 May 2024 20:09)  HR: 56 (01 Jun 2024 11:39) (50 - 59)  BP: 106/58 (01 Jun 2024 11:39) (93/50 - 108/62)  BP(mean): 74 (01 Jun 2024 11:39) (74 - 77)  RR: 18 (01 Jun 2024 11:39) (16 - 18)  SpO2: 97% (01 Jun 2024 11:39) (95% - 97%)  Parameters below as of 01 Jun 2024 11:39  Patient On (Oxygen Delivery Method): room air      General Examination:  General: No acute distress  HEENT: Atraumatic, Normocephalic  Respiratory: CTA B/l.  No crackles, rhonchi, or wheezes.  Cardiovascular: Low heart rate, Regular rhythm.  Normal b/l radial and pedal pulses.    Neurological Examination:  General / Mental Status: AAO x 3.  No aphasia or dysarthria.  Naming and repetition intact.  Cranial Nerves: VFF x 4.  PERRL.  EOMI x 2, No nystagmus or diplopia.  B/l V1-V3 equal and intact to light touch and pinprick.  Symmetric facial movement and palate elevation.  B/l hearing equal to finger rub.  5/5 strength with b/l sternocleidomastoid and trapezius.  Midline tongue protrusion, with no atrophy or fasciculations.  Motor: Normal bulk & tone in all four extremities.  5/5 strength throughout all four extremities.  No downward drift, rigidity, spasticity, or tremors in any of the four extremities.  Sensory: Intact to light touch and pinprick in all four extremities.  Negative Romberg.  Reflex: 2+ and symmetric at b/l biceps, triceps, brachioradialis, patellae, and ankles.  Downgoing toes b/l.  Coordination: No dysmetria with b/l finger-to-nose and heel raise tests.  Symmetric rapid alternating movements b/l.  Gait: Normal, narrow-based gait.  No difficulty with tiptoe, heel, and tandem gaits.        LABORATORY VALUES:        06-01    143  |  112<H>  |  32<H>  ----------------------------<  175<H>  3.7   |  23  |  1.51<H>    Ca    8.5      01 Jun 2024 04:52  Phos  2.9     05-31  Mg     2.4     06-01      Glucose Trend  06-01-24 @ 21:03   -  -- -- 132<H>  06-01-24 @ 16:56   -  -- -- 158<H>  06-01-24 @ 11:33   -  -- -- 138<H>  06-01-24 @ 07:39   -  -- -- 127<H>  06-01-24 @ 04:52   -  175<H> -- --  05-31-24 @ 21:03   -  -- -- 143<H>          NEUROIMAGING:          Please contact the Neurology consult service with any neurological questions.      Garth Bonilla MD   of Neurology  [facility] at [facility]         NEUROLOGY FOLLOW-UP NOTE    NAME:  MAXX PONCE      ASSESSMENT:  74 RHM with chronic b/l blindness and recent decline in cognition and development of behavioral disturbances and insomnia over 6 months, concerning for dementia vs. normal pressure hydrocephalus      RECOMMENDATIONS:    - Maintain Q4H VS & Neurochecks    - Continue Quetiapine 5omg PO QHS to help manage behavioral disturbances and insomnia, as well as an additional Quetiapine 50mg PO Daily PRN Agitation - Uptitrate as per Psychiatry consult    - I have further discussed the risks and benefits of bedside lumbar puncture with the patient's daughter       - Patient daughter does not wish to pursue procedures, including lumbar puncture    - Check Vitamin B12, Folate, and TSH levels, and treat if abnormal    - Metabolic and infectious workup and treatment, including management of diabetes, as per primary team    - DVT ppx: SCDs, Heparin          ******************************    HPI:  74M PMH dementia, DM, and HTN presenting to the ED with AMS. Pt seen at bedside AAOX2, states he does not know why he was brought to the hospital, but was complaining of bilateral knee pain. Daughter called for collateral information, reports that for the past week, her father has become more confused and aggressive at home, pulling out electrical wires at home and trying to flush his clothes down the toilet. He has a HHA during the day for 12 hours x 5 days and his granddaughter stays the rest of the night with him, however, he has been becoming more aggressive and cursing out the granddaughter. He has been having urinary and bowel incontinence. He is blind in both eyes and ambulates with a walker however has been losing his balance lately with no falls. He denies any fevers, chills, CP, SOB, N/V/D, abdominal pain, dysuria, numbness/tingling in extremities.  (30 May 2024 10:14)      NEURO HPI:  History is provided by the patient's daughter at bedside. This is a 74-year-old right-handed man with severely limited vision out of both eyes who has been observed by family to have cognitive decline, insomnia, and increasing frequency of episodes of agitation since January 2024. He has not experienced recent urinary or fecal incontinence, and he uses a cane for support while ambulating due to bilateral knee pain.      INTERVAL HISTORY:  The patient continues to have poor some episodes of agitation requiring enhanced supervision.    MEDICATIONS:  acetaminophen     Tablet .. 650 milliGRAM(s) Oral every 6 hours PRN  aluminum hydroxide/magnesium hydroxide/simethicone Suspension 30 milliLiter(s) Oral every 4 hours PRN  amLODIPine   Tablet 5 milliGRAM(s) Oral every 24 hours  atorvastatin 80 milliGRAM(s) Oral at bedtime  carvedilol 12.5 milliGRAM(s) Oral every 12 hours  enoxaparin Injectable 40 milliGRAM(s) SubCutaneous every 24 hours  erythromycin   Ointment 1 Application(s) Both EYES four times a day  gabapentin 300 milliGRAM(s) Oral daily  haloperidol    Injectable 1 milliGRAM(s) IntraMuscular every 8 hours PRN  insulin glargine Injectable (LANTUS) 20 Unit(s) SubCutaneous at bedtime  insulin lispro (ADMELOG) corrective regimen sliding scale   SubCutaneous three times a day before meals  insulin lispro (ADMELOG) corrective regimen sliding scale   SubCutaneous at bedtime  insulin lispro Injectable (ADMELOG) 5 Unit(s) SubCutaneous three times a day before meals  latanoprost 0.005% Ophthalmic Solution 1 Drop(s) Both EYES at bedtime  lisinopril 20 milliGRAM(s) Oral daily  melatonin 3 milliGRAM(s) Oral at bedtime PRN  mirtazapine 7.5 milliGRAM(s) Oral at bedtime  ondansetron Injectable 4 milliGRAM(s) IV Push every 8 hours PRN  pantoprazole    Tablet 40 milliGRAM(s) Oral before breakfast  QUEtiapine 50 milliGRAM(s) Oral at bedtime  QUEtiapine 50 milliGRAM(s) Oral every 6 hours PRN      ALLERGIES:  No Known Allergies      REVIEW OF SYSTEMS:  Fourteen systems reviewed and negative or unobtainable except as in HPI / Interval History.          OBJECTIVE:  Vital Signs Last 24 Hrs  T(C): 36.5 (01 Jun 2024 11:39), Max: 36.7 (31 May 2024 20:09)  T(F): 97.7 (01 Jun 2024 11:39), Max: 98.1 (31 May 2024 20:09)  HR: 56 (01 Jun 2024 11:39) (50 - 59)  BP: 106/58 (01 Jun 2024 11:39) (93/50 - 108/62)  BP(mean): 74 (01 Jun 2024 11:39) (74 - 77)  RR: 18 (01 Jun 2024 11:39) (16 - 18)  SpO2: 97% (01 Jun 2024 11:39) (95% - 97%)  Parameters below as of 01 Jun 2024 11:39  Patient On (Oxygen Delivery Method): room air      General Examination:  General: No acute distress  HEENT: Atraumatic, Normocephalic  Respiratory: CTA B/l.  No crackles, rhonchi, or wheezes.  Cardiovascular: Low heart rate, Regular rhythm.  Normal b/l radial and pedal pulses.    Neurological Examination:  General / Mental Status: AAO x 1 (only to person).  No apparent aphasia or dysarthria present.  Immediate and 5-minute delayed recall: 3/3.  Cranial Nerves: B/l blink to threat present, but unable to count fingers accurately in any of the four quadrants with either eye.  PERRL.  EOMI x 2, No nystagmus.  B/l V1-V3 equal and intact to light touch and pinprick.  Symmetric facial movement and palate elevation.  B/l hearing equal to finger rub.  At least 3/5 strength with b/l sternocleidomastoid and trapezius, limited by poor effort.  Midline tongue protrusion.  Motor: Normal bulk & tone in all four extremities.  At least 4/5 strength throughout all four extremities.  No downward drift, rigidity, spasticity, or tremors in any of the four extremities.  Sensory: Intact to light touch and pinprick in all four extremities.  Reflex: 1+ and symmetric at b/l biceps, triceps, brachioradialis, patellae, and ankles.  Mute toes b/l.  Coordination: No dysmetria with b/l finger-to-nose and heel raise tests.  Gait and Romberg sign testing deferred per patient preference.          LABORATORY VALUES:      06-01    143  |  112<H>  |  32<H>  ----------------------------<  175<H>  3.7   |  23  |  1.51<H>    Ca    8.5      01 Jun 2024 04:52  Phos  2.9     05-31  Mg     2.4     06-01      Glucose Trend  06-01-24 @ 21:03   -  -- -- 132<H>  06-01-24 @ 16:56   -  -- -- 158<H>  06-01-24 @ 11:33   -  -- -- 138<H>  06-01-24 @ 07:39   -  -- -- 127<H>  06-01-24 @ 04:52   -  175<H> -- --  05-31-24 @ 21:03   -  -- -- 143<H>          NEUROIMAGING:      CT Head (5/30/24):  - No acute intracranial abnormality  - Cerebral ventriculomegaly  - Incidental left occipital scalp soft tissue thickening      MRI Brain w/ CSF Flow without Contrast (5/30/24):  - No acute intracranial abnormality  - Cerebral ventriculomegaly  - Chronic microvascular changes  - Incidental left occipital and suboccipital soft tissue thickening          Please contact the Neurology consult service with any neurological questions.      Garth Bonilla MD   of Neurology  [facility] at [facility]

## 2024-06-01 NOTE — PROGRESS NOTE ADULT - SUBJECTIVE AND OBJECTIVE BOX
Patient is a 74y old  Male who presents with a chief complaint of AMS (31 May 2024 15:59)    PATIENT IS SEEN AND EXAMINED IN MEDICAL FLOOR.    JETT [    ]    SERGIO [   ]      GT [   ]    ALLERGIES:  No Known Allergies      Daily     Daily     VITALS:    Vital Signs Last 24 Hrs  T(C): 36.5 (01 Jun 2024 11:39), Max: 36.7 (31 May 2024 20:09)  T(F): 97.7 (01 Jun 2024 11:39), Max: 98.1 (31 May 2024 20:09)  HR: 56 (01 Jun 2024 11:39) (50 - 59)  BP: 106/58 (01 Jun 2024 11:39) (93/50 - 108/62)  BP(mean): 74 (01 Jun 2024 11:39) (74 - 77)  RR: 18 (01 Jun 2024 11:39) (16 - 18)  SpO2: 97% (01 Jun 2024 11:39) (95% - 97%)    Parameters below as of 01 Jun 2024 11:39  Patient On (Oxygen Delivery Method): room air        LABS:    CBC Full  -  ( 31 May 2024 06:10 )  WBC Count : 7.53 K/uL  RBC Count : 4.91 M/uL  Hemoglobin : 14.0 g/dL  Hematocrit : 43.5 %  Platelet Count - Automated : 237 K/uL  Mean Cell Volume : 88.6 fl  Mean Cell Hemoglobin : 28.5 pg  Mean Cell Hemoglobin Concentration : 32.2 gm/dL  Auto Neutrophil # : x  Auto Lymphocyte # : x  Auto Monocyte # : x  Auto Eosinophil # : x  Auto Basophil # : x  Auto Neutrophil % : x  Auto Lymphocyte % : x  Auto Monocyte % : x  Auto Eosinophil % : x  Auto Basophil % : x      06-01    143  |  112<H>  |  32<H>  ----------------------------<  175<H>  3.7   |  23  |  1.51<H>    Ca    8.5      01 Jun 2024 04:52  Phos  2.9     05-31  Mg     2.4     06-01      CAPILLARY BLOOD GLUCOSE      POCT Blood Glucose.: 138 mg/dL (01 Jun 2024 11:33)  POCT Blood Glucose.: 127 mg/dL (01 Jun 2024 07:39)  POCT Blood Glucose.: 143 mg/dL (31 May 2024 21:03)  POCT Blood Glucose.: 173 mg/dL (31 May 2024 16:45)          Creatinine Trend: 1.51<--, 1.18<--, 1.56<--  I&O's Summary              MEDICATIONS:    MEDICATIONS  (STANDING):  amLODIPine   Tablet 5 milliGRAM(s) Oral every 24 hours  atorvastatin 80 milliGRAM(s) Oral at bedtime  carvedilol 12.5 milliGRAM(s) Oral every 12 hours  enoxaparin Injectable 40 milliGRAM(s) SubCutaneous every 24 hours  erythromycin   Ointment 1 Application(s) Both EYES four times a day  gabapentin 300 milliGRAM(s) Oral daily  insulin glargine Injectable (LANTUS) 20 Unit(s) SubCutaneous at bedtime  insulin lispro (ADMELOG) corrective regimen sliding scale   SubCutaneous three times a day before meals  insulin lispro (ADMELOG) corrective regimen sliding scale   SubCutaneous at bedtime  insulin lispro Injectable (ADMELOG) 5 Unit(s) SubCutaneous three times a day before meals  latanoprost 0.005% Ophthalmic Solution 1 Drop(s) Both EYES at bedtime  lisinopril 20 milliGRAM(s) Oral daily  mirtazapine 7.5 milliGRAM(s) Oral at bedtime  pantoprazole    Tablet 40 milliGRAM(s) Oral before breakfast  QUEtiapine 50 milliGRAM(s) Oral at bedtime      MEDICATIONS  (PRN):  acetaminophen     Tablet .. 650 milliGRAM(s) Oral every 6 hours PRN Temp greater or equal to 38C (100.4F), Mild Pain (1 - 3)  aluminum hydroxide/magnesium hydroxide/simethicone Suspension 30 milliLiter(s) Oral every 4 hours PRN Dyspepsia  haloperidol    Injectable 1 milliGRAM(s) IntraMuscular every 8 hours PRN Agitation  melatonin 3 milliGRAM(s) Oral at bedtime PRN Insomnia  ondansetron Injectable 4 milliGRAM(s) IV Push every 8 hours PRN Nausea and/or Vomiting  QUEtiapine 50 milliGRAM(s) Oral every 6 hours PRN mild to moderate agitation/insomnia        REVIEW OF SYSTEMS:                           ALL ROS DONE [ X   ]      CONSTITUTIONAL:  LETHARGIC [   ], FEVER [   ], UNRESPONSIVE [   ]  CVS:  CP  [   ], SOB, [   ], PALPITATIONS [   ], DIZZYNESS [   ]  RS: COUGH [   ], SPUTUM [   ]  GI: ABDOMINAL PAIN [   ], NAUSEA [   ], VOMITINGS [   ], DIARRHEA [   ], CONSTIPATION [   ]  :  DYSURIA [   ], NOCTURIA [   ], INCREASED FREQUENCY [   ], DRIBLING [   ],  SKELETAL: PAINFUL JOINTS [   ], SWOLLEN JOINTS [   ], NECK ACHE [   ], LOW BACK ACHE [   ],  SKIN : ULCERS [   ], RASH [   ], ITCHING [   ]  CNS: HEAD ACHE [   ], DOUBLE VISION [   ], BLURRED VISION [   ], AMS / CONFUSION [   ], SEIZURES [   ], WEAKNESS [   ],TINGLING / NUMBNESS [   ]        PHYSICAL EXAMINATION:    GENERAL APPEARANCE: NO DISTRESS  HEENT:  NO PALLOR, NO  JVD,  NO   NODES, NECK SUPPLE  CVS: S1 +, S2 +,   RS: AEEB,  OCCASIONAL  RALES +,   NO RONCHI  ABD: SOFT, NT, NO, BS +  EXT: NO PE  SKIN: WARM,   SKELETAL:  ROM ACCEPTABLE  CNS:  AAO X 1-2    RADIOLOGY :    RADIOLOGY AND READINGS REVIEWED    ASSESSMENT :     Altered mental status        PLAN:  HPI:  74M PMH dementia, DM, and HTN presenting to the ED with AMS. Pt seen at bedside AAOX2, states he does not know why he was brought to the hospital, but was complaining of bilateral knee pain. Daughter called for collateral information, reports that for the past week, her father has become more confused and aggressive at home, pulling out electrical wires at home and trying to flush his clothes down the toilet. He has a HHA during the day for 12 hours x 5 days and his granddaughter stays the rest of the night with him, however, he has been becoming more aggressive and cursing out the granddaughter. He has been having urinary and bowel incontinence. He is blind in both eyes and ambulates with a walker however has been losing his balance lately with no falls. He denies any fevers, chills, CP, SOB, N/V/D, abdominal pain, dysuria, numbness/tingling in extremities.      (30 May 2024 10:14)        # [5/31] CASE DW PATIENT'S DAUGHTER VELMA PONCE @ [phone] . PER DAUGHTER PATIENT HAS HAD WORSENING DEMENTIA AND HAS NOTED MOOD CHANGES. SHE ALSO CONVEYS HE IS NONCOMPLIANT WITH MEDICATION DESPITE SUPPORTIVE MEASURES. SHE CONVEYS THAT FAMILY IS AGREEABLE FOR MUSHTAQ     CM TEAM TO COORDINATE MUSHTAQ PLACEMENT    # LIKELY WORSENING VASCULAR DEMENTIA/CHRONIC WHITE MATTER DISEASE / CEREBRAL ISCHEMIA  # ? VENTRICULOMEGALY  - NOTED CT HEAD  - NOTED UA NEGATIVE  - NOTED UTOX NEGATIVE  - F/U MRI BRAIN W/ CSF FLOW  - PSYCHIATRY CONSULT  - NEUROLOGY CONSULT    - ON REMERON  - ON SEROQUEL [UPTITRATING] PER PSYCH    # B/L KNEE PAIN - ?S/T OA  # IMPAIRED GAIT DUE TO GENERALIZED MUSCLE WEAKNESS, POLYARTHRITIS, DIABETIC PERIPHERAL NEUROPATHY  - PRN PAIN CONTROL  - F/U XRAYS B /L KNEES      # DIABETIC NEPHROPATHY  # VITA VS. CKD - IMPROVED  - MONITOR CR  - AVOID NEPHROTOXIC AGENTS    # DM  - HBA1C - 11.7   - SSI + FS    # HTN    # VISUALLY IMPAIRED, LIKELY DIABETIC RETINOPATHY     # GI AND DVT PPX    DR. LIBORIO HAYWARD

## 2024-06-01 NOTE — PROGRESS NOTE ADULT - TIME BILLING
I counseled the family at bedside and primary team about the further testing and treatment indicated for evaluation and management of the patient's cognitive decline with behavioral changes.

## 2024-06-02 LAB
GLUCOSE BLDC GLUCOMTR-MCNC: 179 MG/DL — HIGH (ref 70–99)
GLUCOSE BLDC GLUCOMTR-MCNC: 190 MG/DL — HIGH (ref 70–99)
GLUCOSE BLDC GLUCOMTR-MCNC: 211 MG/DL — HIGH (ref 70–99)
GLUCOSE BLDC GLUCOMTR-MCNC: 253 MG/DL — HIGH (ref 70–99)
GLUCOSE BLDC GLUCOMTR-MCNC: 278 MG/DL — HIGH (ref 70–99)

## 2024-06-02 RX ADMIN — Medication 650 MILLIGRAM(S): at 15:29

## 2024-06-02 RX ADMIN — Medication 5 UNIT(S): at 17:39

## 2024-06-02 RX ADMIN — CARVEDILOL PHOSPHATE 12.5 MILLIGRAM(S): 80 CAPSULE, EXTENDED RELEASE ORAL at 06:10

## 2024-06-02 RX ADMIN — ENOXAPARIN SODIUM 40 MILLIGRAM(S): 100 INJECTION SUBCUTANEOUS at 21:38

## 2024-06-02 RX ADMIN — Medication 1 APPLICATION(S): at 06:11

## 2024-06-02 RX ADMIN — Medication 1 APPLICATION(S): at 17:41

## 2024-06-02 RX ADMIN — GABAPENTIN 300 MILLIGRAM(S): 400 CAPSULE ORAL at 12:08

## 2024-06-02 RX ADMIN — CARVEDILOL PHOSPHATE 12.5 MILLIGRAM(S): 80 CAPSULE, EXTENDED RELEASE ORAL at 19:57

## 2024-06-02 RX ADMIN — LATANOPROST 1 DROP(S): 0.05 SOLUTION/ DROPS OPHTHALMIC; TOPICAL at 21:37

## 2024-06-02 RX ADMIN — ATORVASTATIN CALCIUM 80 MILLIGRAM(S): 80 TABLET, FILM COATED ORAL at 21:37

## 2024-06-02 RX ADMIN — LISINOPRIL 20 MILLIGRAM(S): 2.5 TABLET ORAL at 06:10

## 2024-06-02 RX ADMIN — Medication 5 UNIT(S): at 09:47

## 2024-06-02 RX ADMIN — Medication 650 MILLIGRAM(S): at 16:10

## 2024-06-02 RX ADMIN — Medication 5 UNIT(S): at 12:08

## 2024-06-02 RX ADMIN — Medication 3: at 09:48

## 2024-06-02 RX ADMIN — INSULIN GLARGINE 20 UNIT(S): 100 INJECTION, SOLUTION SUBCUTANEOUS at 21:36

## 2024-06-02 RX ADMIN — Medication 1: at 17:40

## 2024-06-02 RX ADMIN — Medication 1 APPLICATION(S): at 12:09

## 2024-06-02 RX ADMIN — QUETIAPINE FUMARATE 50 MILLIGRAM(S): 200 TABLET, FILM COATED ORAL at 21:37

## 2024-06-02 RX ADMIN — PANTOPRAZOLE SODIUM 40 MILLIGRAM(S): 20 TABLET, DELAYED RELEASE ORAL at 06:10

## 2024-06-02 RX ADMIN — MIRTAZAPINE 7.5 MILLIGRAM(S): 45 TABLET, ORALLY DISINTEGRATING ORAL at 21:38

## 2024-06-02 RX ADMIN — HALOPERIDOL DECANOATE 1 MILLIGRAM(S): 100 INJECTION INTRAMUSCULAR at 18:25

## 2024-06-02 RX ADMIN — Medication 3: at 12:08

## 2024-06-02 RX ADMIN — Medication 1 APPLICATION(S): at 00:35

## 2024-06-02 NOTE — PROGRESS NOTE ADULT - SUBJECTIVE AND OBJECTIVE BOX
Patient is a 74y old  Male who presents with a chief complaint of AMS (01 Jun 2024 16:05)    PATIENT IS SEEN AND EXAMINED IN MEDICAL FLOOR.    EMILI [    ]    SERGIO [   ]      GT [   ]    ALLERGIES:  No Known Allergies      Daily     Daily     VITALS:    Vital Signs Last 24 Hrs  T(C): 36.3 (02 Jun 2024 12:16), Max: 36.6 (02 Jun 2024 05:49)  T(F): 97.4 (02 Jun 2024 12:16), Max: 97.8 (02 Jun 2024 05:49)  HR: 61 (02 Jun 2024 12:16) (61 - 73)  BP: 125/74 (02 Jun 2024 12:16) (118/64 - 129/77)  BP(mean): --  RR: 18 (02 Jun 2024 12:16) (18 - 18)  SpO2: 98% (02 Jun 2024 12:16) (93% - 98%)    Parameters below as of 02 Jun 2024 12:16  Patient On (Oxygen Delivery Method): room air        LABS:        06-01    143  |  112<H>  |  32<H>  ----------------------------<  175<H>  3.7   |  23  |  1.51<H>    Ca    8.5      01 Jun 2024 04:52  Mg     2.4     06-01      CAPILLARY BLOOD GLUCOSE      POCT Blood Glucose.: 253 mg/dL (02 Jun 2024 11:27)  POCT Blood Glucose.: 278 mg/dL (02 Jun 2024 09:40)  POCT Blood Glucose.: 211 mg/dL (02 Jun 2024 07:55)  POCT Blood Glucose.: 132 mg/dL (01 Jun 2024 21:03)  POCT Blood Glucose.: 158 mg/dL (01 Jun 2024 16:56)          Creatinine Trend: 1.51<--, 1.18<--, 1.56<--  I&O's Summary    01 Jun 2024 07:01  -  02 Jun 2024 07:00  --------------------------------------------------------  IN: 0 mL / OUT: 1450 mL / NET: -1450 mL                MEDICATIONS:    MEDICATIONS  (STANDING):  amLODIPine   Tablet 5 milliGRAM(s) Oral every 24 hours  atorvastatin 80 milliGRAM(s) Oral at bedtime  carvedilol 12.5 milliGRAM(s) Oral every 12 hours  enoxaparin Injectable 40 milliGRAM(s) SubCutaneous every 24 hours  erythromycin   Ointment 1 Application(s) Both EYES four times a day  gabapentin 300 milliGRAM(s) Oral daily  insulin glargine Injectable (LANTUS) 20 Unit(s) SubCutaneous at bedtime  insulin lispro (ADMELOG) corrective regimen sliding scale   SubCutaneous three times a day before meals  insulin lispro (ADMELOG) corrective regimen sliding scale   SubCutaneous at bedtime  insulin lispro Injectable (ADMELOG) 5 Unit(s) SubCutaneous three times a day before meals  latanoprost 0.005% Ophthalmic Solution 1 Drop(s) Both EYES at bedtime  lisinopril 20 milliGRAM(s) Oral daily  mirtazapine 7.5 milliGRAM(s) Oral at bedtime  pantoprazole    Tablet 40 milliGRAM(s) Oral before breakfast  QUEtiapine 50 milliGRAM(s) Oral at bedtime      MEDICATIONS  (PRN):  acetaminophen     Tablet .. 650 milliGRAM(s) Oral every 6 hours PRN Temp greater or equal to 38C (100.4F), Mild Pain (1 - 3)  aluminum hydroxide/magnesium hydroxide/simethicone Suspension 30 milliLiter(s) Oral every 4 hours PRN Dyspepsia  haloperidol    Injectable 1 milliGRAM(s) IntraMuscular every 8 hours PRN Agitation  melatonin 3 milliGRAM(s) Oral at bedtime PRN Insomnia  ondansetron Injectable 4 milliGRAM(s) IV Push every 8 hours PRN Nausea and/or Vomiting  QUEtiapine 50 milliGRAM(s) Oral every 6 hours PRN mild to moderate agitation/insomnia        REVIEW OF SYSTEMS:                           ALL ROS DONE [ X   ]      CONSTITUTIONAL:  LETHARGIC [   ], FEVER [   ], UNRESPONSIVE [   ]  CVS:  CP  [   ], SOB, [   ], PALPITATIONS [   ], DIZZYNESS [   ]  RS: COUGH [   ], SPUTUM [   ]  GI: ABDOMINAL PAIN [   ], NAUSEA [   ], VOMITINGS [   ], DIARRHEA [   ], CONSTIPATION [   ]  :  DYSURIA [   ], NOCTURIA [   ], INCREASED FREQUENCY [   ], DRIBLING [   ],  SKELETAL: PAINFUL JOINTS [   ], SWOLLEN JOINTS [   ], NECK ACHE [   ], LOW BACK ACHE [   ],  SKIN : ULCERS [   ], RASH [   ], ITCHING [   ]  CNS: HEAD ACHE [   ], DOUBLE VISION [   ], BLURRED VISION [   ], AMS / CONFUSION [   ], SEIZURES [   ], WEAKNESS [   ],TINGLING / NUMBNESS [   ]      PHYSICAL EXAMINATION:    GENERAL APPEARANCE: NO DISTRESS  HEENT:  NO PALLOR, NO  JVD,  NO   NODES, NECK SUPPLE  CVS: S1 +, S2 +,   RS: AEEB,  OCCASIONAL  RALES +,   NO RONCHI  ABD: SOFT, NT, NO, BS +  EXT: NO PE  SKIN: WARM,   SKELETAL:  ROM ACCEPTABLE  CNS:  AAO X 1-2    RADIOLOGY :    RADIOLOGY AND READINGS REVIEWED    ASSESSMENT :     Altered mental status        PLAN:  HPI:  74M PMH dementia, DM, and HTN presenting to the ED with AMS. Pt seen at bedside AAOX2, states he does not know why he was brought to the hospital, but was complaining of bilateral knee pain. Daughter called for collateral information, reports that for the past week, her father has become more confused and aggressive at home, pulling out electrical wires at home and trying to flush his clothes down the toilet. He has a HHA during the day for 12 hours x 5 days and his granddaughter stays the rest of the night with him, however, he has been becoming more aggressive and cursing out the granddaughter. He has been having urinary and bowel incontinence. He is blind in both eyes and ambulates with a walker however has been losing his balance lately with no falls. He denies any fevers, chills, CP, SOB, N/V/D, abdominal pain, dysuria, numbness/tingling in extremities.      (30 May 2024 10:14)        # [5/31] CASE DW PATIENT'S DAUGHTER VELMA PONCE @ [phone] . PER DAUGHTER PATIENT HAS HAD WORSENING DEMENTIA AND HAS NOTED MOOD CHANGES. SHE ALSO CONVEYS HE IS NONCOMPLIANT WITH MEDICATION DESPITE SUPPORTIVE MEASURES. SHE CONVEYS THAT FAMILY IS AGREEABLE FOR MUSHTAQ       # CM TEAM TO COORDINATE MUSHTAQ PLACEMENT      # RESOLVED AMS     # LIKELY WORSENING VASCULAR DEMENTIA/CHRONIC WHITE MATTER DISEASE / CEREBRAL ISCHEMIA  # ? VENTRICULOMEGALY  - NOTED CT HEAD  - NOTED UA NEGATIVE  - NOTED UTOX NEGATIVE  - F/U MRI BRAIN W/ CSF FLOW  - PSYCHIATRY CONSULT  - NEUROLOGY CONSULT    - ON REMERON  - ON SEROQUEL [UPTITRATING] PER PSYCH    # B/L KNEE PAIN - ?S/T OA  # IMPAIRED GAIT DUE TO GENERALIZED MUSCLE WEAKNESS, POLYARTHRITIS, DIABETIC PERIPHERAL NEUROPATHY  - PRN PAIN CONTROL  - F/U XRAYS B /L KNEES      # DIABETIC NEPHROPATHY  # VITA VS. CKD - IMPROVED  - MONITOR CR  - AVOID NEPHROTOXIC AGENTS    # DM  - HBA1C - 11.7   - SSI + FS    # HTN    # VISUALLY IMPAIRED, LIKELY DIABETIC RETINOPATHY     # GI AND DVT PPX    DR. LIBORIO HAYWARD

## 2024-06-03 LAB
ANION GAP SERPL CALC-SCNC: 10 MMOL/L — SIGNIFICANT CHANGE UP (ref 5–17)
BUN SERPL-MCNC: 19 MG/DL — HIGH (ref 7–18)
CALCIUM SERPL-MCNC: 8.9 MG/DL — SIGNIFICANT CHANGE UP (ref 8.4–10.5)
CHLORIDE SERPL-SCNC: 110 MMOL/L — HIGH (ref 96–108)
CO2 SERPL-SCNC: 20 MMOL/L — LOW (ref 22–31)
CREAT SERPL-MCNC: 1.13 MG/DL — SIGNIFICANT CHANGE UP (ref 0.5–1.3)
EGFR: 68 ML/MIN/1.73M2 — SIGNIFICANT CHANGE UP
GLUCOSE BLDC GLUCOMTR-MCNC: 154 MG/DL — HIGH (ref 70–99)
GLUCOSE BLDC GLUCOMTR-MCNC: 155 MG/DL — HIGH (ref 70–99)
GLUCOSE BLDC GLUCOMTR-MCNC: 180 MG/DL — HIGH (ref 70–99)
GLUCOSE BLDC GLUCOMTR-MCNC: 236 MG/DL — HIGH (ref 70–99)
GLUCOSE SERPL-MCNC: 171 MG/DL — HIGH (ref 70–99)
HCT VFR BLD CALC: 43.9 % — SIGNIFICANT CHANGE UP (ref 39–50)
HGB BLD-MCNC: 14.2 G/DL — SIGNIFICANT CHANGE UP (ref 13–17)
MAGNESIUM SERPL-MCNC: 2 MG/DL — SIGNIFICANT CHANGE UP (ref 1.6–2.6)
MCHC RBC-ENTMCNC: 28.9 PG — SIGNIFICANT CHANGE UP (ref 27–34)
MCHC RBC-ENTMCNC: 32.3 GM/DL — SIGNIFICANT CHANGE UP (ref 32–36)
MCV RBC AUTO: 89.4 FL — SIGNIFICANT CHANGE UP (ref 80–100)
NRBC # BLD: 0 /100 WBCS — SIGNIFICANT CHANGE UP (ref 0–0)
PLATELET # BLD AUTO: 238 K/UL — SIGNIFICANT CHANGE UP (ref 150–400)
POTASSIUM SERPL-MCNC: 3.9 MMOL/L — SIGNIFICANT CHANGE UP (ref 3.5–5.3)
POTASSIUM SERPL-SCNC: 3.9 MMOL/L — SIGNIFICANT CHANGE UP (ref 3.5–5.3)
RBC # BLD: 4.91 M/UL — SIGNIFICANT CHANGE UP (ref 4.2–5.8)
RBC # FLD: 14.3 % — SIGNIFICANT CHANGE UP (ref 10.3–14.5)
SODIUM SERPL-SCNC: 140 MMOL/L — SIGNIFICANT CHANGE UP (ref 135–145)
WBC # BLD: 12.9 K/UL — HIGH (ref 3.8–10.5)
WBC # FLD AUTO: 12.9 K/UL — HIGH (ref 3.8–10.5)

## 2024-06-03 RX ADMIN — Medication 1: at 08:35

## 2024-06-03 RX ADMIN — HALOPERIDOL DECANOATE 1 MILLIGRAM(S): 100 INJECTION INTRAMUSCULAR at 02:44

## 2024-06-03 RX ADMIN — Medication 1: at 18:01

## 2024-06-03 RX ADMIN — Medication 1 APPLICATION(S): at 23:42

## 2024-06-03 RX ADMIN — Medication 5 UNIT(S): at 08:34

## 2024-06-03 RX ADMIN — Medication 1 APPLICATION(S): at 05:14

## 2024-06-03 RX ADMIN — PANTOPRAZOLE SODIUM 40 MILLIGRAM(S): 20 TABLET, DELAYED RELEASE ORAL at 05:13

## 2024-06-03 RX ADMIN — Medication 1 APPLICATION(S): at 00:50

## 2024-06-03 RX ADMIN — LISINOPRIL 20 MILLIGRAM(S): 2.5 TABLET ORAL at 05:13

## 2024-06-03 RX ADMIN — LATANOPROST 1 DROP(S): 0.05 SOLUTION/ DROPS OPHTHALMIC; TOPICAL at 21:32

## 2024-06-03 RX ADMIN — Medication 1 APPLICATION(S): at 11:45

## 2024-06-03 RX ADMIN — INSULIN GLARGINE 20 UNIT(S): 100 INJECTION, SOLUTION SUBCUTANEOUS at 21:30

## 2024-06-03 RX ADMIN — GABAPENTIN 300 MILLIGRAM(S): 400 CAPSULE ORAL at 11:45

## 2024-06-03 RX ADMIN — ENOXAPARIN SODIUM 40 MILLIGRAM(S): 100 INJECTION SUBCUTANEOUS at 21:32

## 2024-06-03 RX ADMIN — Medication 5 UNIT(S): at 11:50

## 2024-06-03 RX ADMIN — Medication 1 APPLICATION(S): at 18:02

## 2024-06-03 RX ADMIN — Medication 1: at 11:51

## 2024-06-03 RX ADMIN — MIRTAZAPINE 7.5 MILLIGRAM(S): 45 TABLET, ORALLY DISINTEGRATING ORAL at 21:32

## 2024-06-03 RX ADMIN — QUETIAPINE FUMARATE 50 MILLIGRAM(S): 200 TABLET, FILM COATED ORAL at 21:32

## 2024-06-03 RX ADMIN — CARVEDILOL PHOSPHATE 12.5 MILLIGRAM(S): 80 CAPSULE, EXTENDED RELEASE ORAL at 18:02

## 2024-06-03 RX ADMIN — ATORVASTATIN CALCIUM 80 MILLIGRAM(S): 80 TABLET, FILM COATED ORAL at 21:32

## 2024-06-03 RX ADMIN — CARVEDILOL PHOSPHATE 12.5 MILLIGRAM(S): 80 CAPSULE, EXTENDED RELEASE ORAL at 05:14

## 2024-06-03 NOTE — PROGRESS NOTE ADULT - SUBJECTIVE AND OBJECTIVE BOX
Patient is a 74y old  Male who presents with a chief complaint of AMS (02 Jun 2024 14:31)    PATIENT IS SEEN AND EXAMINED IN MEDICAL FLOOR.  NGT [    ]    SERGIO [   ]      GT [   ]    ALLERGIES:  No Known Allergies      Daily     Daily     VITALS:    Vital Signs Last 24 Hrs  T(C): 37 (03 Jun 2024 04:54), Max: 37 (02 Jun 2024 19:46)  T(F): 98.6 (03 Jun 2024 04:54), Max: 98.6 (02 Jun 2024 19:46)  HR: 81 (03 Jun 2024 05:13) (54 - 95)  BP: 122/69 (03 Jun 2024 05:13) (108/85 - 162/85)  BP(mean): --  RR: 18 (03 Jun 2024 04:54) (18 - 18)  SpO2: 97% (03 Jun 2024 04:54) (96% - 98%)    Parameters below as of 03 Jun 2024 04:54  Patient On (Oxygen Delivery Method): room air        LABS:    CBC Full  -  ( 03 Jun 2024 05:06 )  WBC Count : 12.90 K/uL  RBC Count : 4.91 M/uL  Hemoglobin : 14.2 g/dL  Hematocrit : 43.9 %  Platelet Count - Automated : 238 K/uL  Mean Cell Volume : 89.4 fl  Mean Cell Hemoglobin : 28.9 pg  Mean Cell Hemoglobin Concentration : 32.3 gm/dL  Auto Neutrophil # : x  Auto Lymphocyte # : x  Auto Monocyte # : x  Auto Eosinophil # : x  Auto Basophil # : x  Auto Neutrophil % : x  Auto Lymphocyte % : x  Auto Monocyte % : x  Auto Eosinophil % : x  Auto Basophil % : x      06-03    140  |  110<H>  |  19<H>  ----------------------------<  171<H>  3.9   |  20<L>  |  1.13    Ca    8.9      03 Jun 2024 05:06  Mg     2.0     06-03      CAPILLARY BLOOD GLUCOSE      POCT Blood Glucose.: 155 mg/dL (03 Jun 2024 07:57)  POCT Blood Glucose.: 190 mg/dL (02 Jun 2024 21:31)  POCT Blood Glucose.: 179 mg/dL (02 Jun 2024 17:01)  POCT Blood Glucose.: 253 mg/dL (02 Jun 2024 11:27)          Creatinine Trend: 1.13<--, 1.51<--, 1.18<--, 1.56<--  I&O's Summary              MEDICATIONS:    MEDICATIONS  (STANDING):  amLODIPine   Tablet 5 milliGRAM(s) Oral every 24 hours  atorvastatin 80 milliGRAM(s) Oral at bedtime  carvedilol 12.5 milliGRAM(s) Oral every 12 hours  enoxaparin Injectable 40 milliGRAM(s) SubCutaneous every 24 hours  erythromycin   Ointment 1 Application(s) Both EYES four times a day  gabapentin 300 milliGRAM(s) Oral daily  insulin glargine Injectable (LANTUS) 20 Unit(s) SubCutaneous at bedtime  insulin lispro (ADMELOG) corrective regimen sliding scale   SubCutaneous three times a day before meals  insulin lispro (ADMELOG) corrective regimen sliding scale   SubCutaneous at bedtime  insulin lispro Injectable (ADMELOG) 5 Unit(s) SubCutaneous three times a day before meals  latanoprost 0.005% Ophthalmic Solution 1 Drop(s) Both EYES at bedtime  lisinopril 20 milliGRAM(s) Oral daily  mirtazapine 7.5 milliGRAM(s) Oral at bedtime  pantoprazole    Tablet 40 milliGRAM(s) Oral before breakfast  QUEtiapine 50 milliGRAM(s) Oral at bedtime      MEDICATIONS  (PRN):  acetaminophen     Tablet .. 650 milliGRAM(s) Oral every 6 hours PRN Temp greater or equal to 38C (100.4F), Mild Pain (1 - 3)  aluminum hydroxide/magnesium hydroxide/simethicone Suspension 30 milliLiter(s) Oral every 4 hours PRN Dyspepsia  haloperidol    Injectable 1 milliGRAM(s) IntraMuscular every 8 hours PRN Agitation  melatonin 3 milliGRAM(s) Oral at bedtime PRN Insomnia  ondansetron Injectable 4 milliGRAM(s) IV Push every 8 hours PRN Nausea and/or Vomiting  QUEtiapine 50 milliGRAM(s) Oral every 6 hours PRN mild to moderate agitation/insomnia      REVIEW OF SYSTEMS:                           ALL ROS DONE [ X   ]    CONSTITUTIONAL:  LETHARGIC [   ], FEVER [   ], UNRESPONSIVE [   ]  CVS:  CP  [   ], SOB, [   ], PALPITATIONS [   ], DIZZYNESS [   ]  RS: COUGH [   ], SPUTUM [   ]  GI: ABDOMINAL PAIN [   ], NAUSEA [   ], VOMITINGS [   ], DIARRHEA [   ], CONSTIPATION [   ]  :  DYSURIA [   ], NOCTURIA [   ], INCREASED FREQUENCY [   ], DRIBLING [   ],  SKELETAL: PAINFUL JOINTS [   ], SWOLLEN JOINTS [   ], NECK ACHE [   ], LOW BACK ACHE [   ],  SKIN : ULCERS [   ], RASH [   ], ITCHING [   ]  CNS: HEAD ACHE [   ], DOUBLE VISION [   ], BLURRED VISION [   ], AMS / CONFUSION [   ], SEIZURES [   ], WEAKNESS [   ],TINGLING / NUMBNESS [   ]      PHYSICAL EXAMINATION:    GENERAL APPEARANCE: NO DISTRESS  HEENT:  NO PALLOR, NO  JVD,  NO   NODES, NECK SUPPLE  CVS: S1 +, S2 +,   RS: AEEB,  OCCASIONAL  RALES +,   NO RONCHI  ABD: SOFT, NT, NO, BS +  EXT: NO PE  SKIN: WARM,   SKELETAL:  ROM ACCEPTABLE  CNS:  AAO X 1-2    RADIOLOGY :    RADIOLOGY AND READINGS REVIEWED    ASSESSMENT :     Altered mental status        PLAN:  HPI:  74M PMH dementia, DM, and HTN presenting to the ED with AMS. Pt seen at bedside AAOX2, states he does not know why he was brought to the hospital, but was complaining of bilateral knee pain. Daughter called for collateral information, reports that for the past week, her father has become more confused and aggressive at home, pulling out electrical wires at home and trying to flush his clothes down the toilet. He has a HHA during the day for 12 hours x 5 days and his granddaughter stays the rest of the night with him, however, he has been becoming more aggressive and cursing out the granddaughter. He has been having urinary and bowel incontinence. He is blind in both eyes and ambulates with a walker however has been losing his balance lately with no falls. He denies any fevers, chills, CP, SOB, N/V/D, abdominal pain, dysuria, numbness/tingling in extremities.      (30 May 2024 10:14)        # [5/31] CASE DW PATIENT'S DAUGHTER VELMA PONCE @ [phone] . PER DAUGHTER PATIENT HAS HAD WORSENING DEMENTIA AND HAS NOTED MOOD CHANGES. SHE ALSO CONVEYS HE IS NONCOMPLIANT WITH MEDICATION DESPITE SUPPORTIVE MEASURES. SHE CONVEYS THAT FAMILY IS AGREEABLE FOR MUSHTAQ       # CM TEAM TO COORDINATE MUSHTAQ PLACEMENT      # RESOLVED AMS     # LIKELY WORSENING VASCULAR DEMENTIA/CHRONIC WHITE MATTER DISEASE / CEREBRAL ISCHEMIA  # ? VENTRICULOMEGALY  - NOTED CT HEAD  - NOTED UA NEGATIVE  - NOTED UTOX NEGATIVE  - F/U MRI BRAIN W/ CSF FLOW  - PSYCHIATRY CONSULT  - NEUROLOGY CONSULT    - ON REMERON  - ON SEROQUEL [UPTITRATING] PER PSYCH    - NEUROLOGY TEAM DISCUSSED LP AND FURTHER WORKUP WITH FAMILY - THEY DO NOT WISH TO PURSUE ANY FURTHER WORKUP.    # B/L KNEE PAIN - ?S/T OA  # IMPAIRED GAIT DUE TO GENERALIZED MUSCLE WEAKNESS, POLYARTHRITIS, DIABETIC PERIPHERAL NEUROPATHY  - PRN PAIN CONTROL  - F/U XRAYS B /L KNEES      # DIABETIC NEPHROPATHY  # VITA VS. CKD - IMPROVED  - MONITOR CR  - AVOID NEPHROTOXIC AGENTS    # DM  - HBA1C - 11.7   - SSI + FS    # HTN    # VISUALLY IMPAIRED, LIKELY DIABETIC RETINOPATHY     # GI AND DVT PPX       Patient is a 74y old  Male who presents with a chief complaint of AMS (02 Jun 2024 14:31)    PATIENT IS SEEN AND EXAMINED IN MEDICAL FLOOR.      ALLERGIES:  No Known Allergies      VITALS:    Vital Signs Last 24 Hrs  T(C): 37 (03 Jun 2024 04:54), Max: 37 (02 Jun 2024 19:46)  T(F): 98.6 (03 Jun 2024 04:54), Max: 98.6 (02 Jun 2024 19:46)  HR: 81 (03 Jun 2024 05:13) (54 - 95)  BP: 122/69 (03 Jun 2024 05:13) (108/85 - 162/85)  BP(mean): --  RR: 18 (03 Jun 2024 04:54) (18 - 18)  SpO2: 97% (03 Jun 2024 04:54) (96% - 98%)    Parameters below as of 03 Jun 2024 04:54  Patient On (Oxygen Delivery Method): room air        LABS:    CBC Full  -  ( 03 Jun 2024 05:06 )  WBC Count : 12.90 K/uL  RBC Count : 4.91 M/uL  Hemoglobin : 14.2 g/dL  Hematocrit : 43.9 %  Platelet Count - Automated : 238 K/uL  Mean Cell Volume : 89.4 fl  Mean Cell Hemoglobin : 28.9 pg  Mean Cell Hemoglobin Concentration : 32.3 gm/dL  Auto Neutrophil # : x  Auto Lymphocyte # : x  Auto Monocyte # : x  Auto Eosinophil # : x  Auto Basophil # : x  Auto Neutrophil % : x  Auto Lymphocyte % : x  Auto Monocyte % : x  Auto Eosinophil % : x  Auto Basophil % : x      06-03    140  |  110<H>  |  19<H>  ----------------------------<  171<H>  3.9   |  20<L>  |  1.13    Ca    8.9      03 Jun 2024 05:06  Mg     2.0     06-03      CAPILLARY BLOOD GLUCOSE      POCT Blood Glucose.: 155 mg/dL (03 Jun 2024 07:57)  POCT Blood Glucose.: 190 mg/dL (02 Jun 2024 21:31)  POCT Blood Glucose.: 179 mg/dL (02 Jun 2024 17:01)  POCT Blood Glucose.: 253 mg/dL (02 Jun 2024 11:27)          Creatinine Trend: 1.13<--, 1.51<--, 1.18<--, 1.56<--  I&O's Summary              MEDICATIONS:    MEDICATIONS  (STANDING):  amLODIPine   Tablet 5 milliGRAM(s) Oral every 24 hours  atorvastatin 80 milliGRAM(s) Oral at bedtime  carvedilol 12.5 milliGRAM(s) Oral every 12 hours  enoxaparin Injectable 40 milliGRAM(s) SubCutaneous every 24 hours  erythromycin   Ointment 1 Application(s) Both EYES four times a day  gabapentin 300 milliGRAM(s) Oral daily  insulin glargine Injectable (LANTUS) 20 Unit(s) SubCutaneous at bedtime  insulin lispro (ADMELOG) corrective regimen sliding scale   SubCutaneous three times a day before meals  insulin lispro (ADMELOG) corrective regimen sliding scale   SubCutaneous at bedtime  insulin lispro Injectable (ADMELOG) 5 Unit(s) SubCutaneous three times a day before meals  latanoprost 0.005% Ophthalmic Solution 1 Drop(s) Both EYES at bedtime  lisinopril 20 milliGRAM(s) Oral daily  mirtazapine 7.5 milliGRAM(s) Oral at bedtime  pantoprazole    Tablet 40 milliGRAM(s) Oral before breakfast  QUEtiapine 50 milliGRAM(s) Oral at bedtime      MEDICATIONS  (PRN):  acetaminophen     Tablet .. 650 milliGRAM(s) Oral every 6 hours PRN Temp greater or equal to 38C (100.4F), Mild Pain (1 - 3)  aluminum hydroxide/magnesium hydroxide/simethicone Suspension 30 milliLiter(s) Oral every 4 hours PRN Dyspepsia  haloperidol    Injectable 1 milliGRAM(s) IntraMuscular every 8 hours PRN Agitation  melatonin 3 milliGRAM(s) Oral at bedtime PRN Insomnia  ondansetron Injectable 4 milliGRAM(s) IV Push every 8 hours PRN Nausea and/or Vomiting  QUEtiapine 50 milliGRAM(s) Oral every 6 hours PRN mild to moderate agitation/insomnia      REVIEW OF SYSTEMS:                           ALL ROS DONE [ X   ]    CONSTITUTIONAL:  LETHARGIC [   ], FEVER [   ], UNRESPONSIVE [   ]  CVS:  CP  [   ], SOB, [   ], PALPITATIONS [   ], DIZZYNESS [   ]  RS: COUGH [   ], SPUTUM [   ]  GI: ABDOMINAL PAIN [   ], NAUSEA [   ], VOMITINGS [   ], DIARRHEA [   ], CONSTIPATION [   ]  :  DYSURIA [   ], NOCTURIA [   ], INCREASED FREQUENCY [   ], DRIBLING [   ],  SKELETAL: PAINFUL JOINTS [   ], SWOLLEN JOINTS [   ], NECK ACHE [   ], LOW BACK ACHE [   ],  SKIN : ULCERS [   ], RASH [   ], ITCHING [   ]  CNS: HEAD ACHE [   ], DOUBLE VISION [   ], BLURRED VISION [   ], AMS / CONFUSION [   ], SEIZURES [   ], WEAKNESS [   ],TINGLING / NUMBNESS [   ]      PHYSICAL EXAMINATION:    GENERAL APPEARANCE: NO DISTRESS  HEENT:  NO PALLOR, NO  JVD,  NO   NODES, NECK SUPPLE  CVS: S1 +, S2 +,   RS: AEEB,  OCCASIONAL  RALES +,   NO RONCHI  ABD: SOFT, NT, NO, BS +  EXT: NO PE  SKIN: WARM,   SKELETAL:  ROM ACCEPTABLE  CNS:  AAO X 1-2    RADIOLOGY :    RADIOLOGY AND READINGS REVIEWED    ASSESSMENT :     Altered mental status        PLAN:  HPI:  74M PMH dementia, DM, and HTN presenting to the ED with AMS. Pt seen at bedside AAOX2, states he does not know why he was brought to the hospital, but was complaining of bilateral knee pain. Daughter called for collateral information, reports that for the past week, her father has become more confused and aggressive at home, pulling out electrical wires at home and trying to flush his clothes down the toilet. He has a HHA during the day for 12 hours x 5 days and his granddaughter stays the rest of the night with him, however, he has been becoming more aggressive and cursing out the granddaughter. He has been having urinary and bowel incontinence. He is blind in both eyes and ambulates with a walker however has been losing his balance lately with no falls. He denies any fevers, chills, CP, SOB, N/V/D, abdominal pain, dysuria, numbness/tingling in extremities.      (30 May 2024 10:14)        # [6/3] CASE DW PATIENT'S DAUGHTER VELMA PONCE @ [phone] . PER DAUGHTER PATIENT HAS HAD WORSENING DEMENTIA AND HAS NOTED MOOD CHANGES. SHE ALSO CONVEYS HE IS NONCOMPLIANT WITH MEDICATION DESPITE SUPPORTIVE MEASURES. SHE CONVEYS THAT FAMILY IS AGREEABLE FOR MUSHTAQ.   - SHE AND FAMILY ARE DISCUSSING WHETHER TO PURSUE LP AT THIS TIME OR NOT. NEUROLOGY TEAM DISCUSSED LP AND FURTHER WORKUP WITH FAMILY - THEY ARE DISCUSSING AS A FAMILY REGARDING WHETHER TO PURSUE FURTHER WORKUP AT THIS TIME. THEY UNDERSTAND RISKS AND BENEFITS OF FURTHER EVALUATION. THEY ALSO UNDERSTAND RISKS OF DEFERRING EVALUATION. THEY WILL DISCUSS AS A FAMILY AND DISCUSS WITH NEUROLOGY TO LET THE TEAM KNOW THEIR WISHES.         #  TEAM TO COORDINATE MUSHTAQ PLACEMENT      # RESOLVED AMS     # LIKELY WORSENING VASCULAR DEMENTIA/CHRONIC WHITE MATTER DISEASE / CEREBRAL ISCHEMIA  # ? VENTRICULOMEGALY vs. NPH  - NOTED CT HEAD  - NOTED UA NEGATIVE  - NOTED UTOX NEGATIVE  - MRI BRAIN W/ CSF FLOW REVIEWED  - PSYCHIATRY CONSULT  - NEUROLOGY CONSULT    - ON REMERON  - ON SEROQUEL [UPTITRATING] PER PSYCH    - NEUROLOGY TEAM DISCUSSED LP AND FURTHER WORKUP WITH FAMILY - THEY ARE DISCUSSING AS A FAMILY REGARDING WHETHER TO PURSUE FURTHER WORKUP AT THIS TIME. THEY UNDERSTAND RISKS AND BENEFITS OF FURTHER EVALUATION. THEY ALSO UNDERSTAND RISKS OF DEFERRING EVALUATION.    # B/L KNEE PAIN - ?S/T OA  # IMPAIRED GAIT DUE TO GENERALIZED MUSCLE WEAKNESS, POLYARTHRITIS, DIABETIC PERIPHERAL NEUROPATHY  - PRN PAIN CONTROL  - F/U XRAYS B /L KNEES      # DIABETIC NEPHROPATHY  # VITA VS. CKD - IMPROVED  - MONITOR CR  - AVOID NEPHROTOXIC AGENTS    # DM  - HBA1C - 11.7   - SSI + FS    # HTN    # VISUALLY IMPAIRED, LIKELY DIABETIC RETINOPATHY     # GI AND DVT PPX

## 2024-06-03 NOTE — PROGRESS NOTE ADULT - PROBLEM SELECTOR PLAN 6
h/o HTN  /81 on admission  on lisinopril and amlodipine at home  Improved control   Add Carvedilol 12.5mg Q12  monitor BP

## 2024-06-03 NOTE — PROGRESS NOTE ADULT - PROBLEM SELECTOR PLAN 5
h/o DM   on lantus 40 qhs and   blood glucose 363 on admission bmp  A1C 11.7  Now Improved control  consistent carb diet  Continue  lantus 20 at bedtime and lispro 5 premeals with ISS  Continue glucose monitoring ac/HS and coverage with Admelog   Endocrinology Dr. Gotti

## 2024-06-03 NOTE — PROGRESS NOTE ADULT - SUBJECTIVE AND OBJECTIVE BOX
NP Note discussed with  primary attending    Patient is a 74y old  Male who presents with a chief complaint of AMS (03 Jun 2024 09:53)      INTERVAL HPI/OVERNIGHT EVENTS: no new complaints    MEDICATIONS  (STANDING):  amLODIPine   Tablet 5 milliGRAM(s) Oral every 24 hours  atorvastatin 80 milliGRAM(s) Oral at bedtime  carvedilol 12.5 milliGRAM(s) Oral every 12 hours  enoxaparin Injectable 40 milliGRAM(s) SubCutaneous every 24 hours  erythromycin   Ointment 1 Application(s) Both EYES four times a day  gabapentin 300 milliGRAM(s) Oral daily  insulin glargine Injectable (LANTUS) 20 Unit(s) SubCutaneous at bedtime  insulin lispro (ADMELOG) corrective regimen sliding scale   SubCutaneous three times a day before meals  insulin lispro (ADMELOG) corrective regimen sliding scale   SubCutaneous at bedtime  insulin lispro Injectable (ADMELOG) 5 Unit(s) SubCutaneous three times a day before meals  latanoprost 0.005% Ophthalmic Solution 1 Drop(s) Both EYES at bedtime  lisinopril 20 milliGRAM(s) Oral daily  mirtazapine 7.5 milliGRAM(s) Oral at bedtime  pantoprazole    Tablet 40 milliGRAM(s) Oral before breakfast  QUEtiapine 50 milliGRAM(s) Oral at bedtime    MEDICATIONS  (PRN):  acetaminophen     Tablet .. 650 milliGRAM(s) Oral every 6 hours PRN Temp greater or equal to 38C (100.4F), Mild Pain (1 - 3)  aluminum hydroxide/magnesium hydroxide/simethicone Suspension 30 milliLiter(s) Oral every 4 hours PRN Dyspepsia  haloperidol    Injectable 1 milliGRAM(s) IntraMuscular every 8 hours PRN Agitation  melatonin 3 milliGRAM(s) Oral at bedtime PRN Insomnia  ondansetron Injectable 4 milliGRAM(s) IV Push every 8 hours PRN Nausea and/or Vomiting  QUEtiapine 50 milliGRAM(s) Oral every 6 hours PRN mild to moderate agitation/insomnia      __________________________________________________  REVIEW OF SYSTEMS: Unable to elicit ROS due to impaired cognition    CONSTITUTIONAL: No fever,       Vital Signs Last 24 Hrs  T(C): 36.9 (03 Jun 2024 12:22), Max: 37 (02 Jun 2024 19:46)  T(F): 98.4 (03 Jun 2024 12:22), Max: 98.6 (02 Jun 2024 19:46)  HR: 88 (03 Jun 2024 15:10) (52 - 95)  BP: 125/84 (03 Jun 2024 15:10) (108/85 - 162/85)  BP(mean): 65 (03 Jun 2024 15:10) (65 - 65)  RR: 17 (03 Jun 2024 12:22) (17 - 18)  SpO2: 97% (03 Jun 2024 15:10) (95% - 97%)    Parameters below as of 03 Jun 2024 15:10  Patient On (Oxygen Delivery Method): room air        ________________________________________________  PHYSICAL EXAM:  GENERAL: NAD  HEENT: Normocephalic;  conjunctivae and sclerae clear; moist mucous membranes;   NECK : supple  CHEST/LUNG: Clear to ausculitation bilaterally with good air entry   HEART: S1 S2  regular; no murmurs, gallops or rubs  ABDOMEN: Soft, Nontender, Nondistended; Bowel sounds present  EXTREMITIES: no cyanosis; no edema; no calf tenderness  SKIN: warm and dry; no rash  NERVOUS SYSTEM: A&Ox0    _________________________________________________  LABS:                        14.2   12.90 )-----------( 238      ( 03 Jun 2024 05:06 )             43.9     06-03    140  |  110<H>  |  19<H>  ----------------------------<  171<H>  3.9   |  20<L>  |  1.13    Ca    8.9      03 Jun 2024 05:06  Mg     2.0     06-03        Urinalysis Basic - ( 03 Jun 2024 05:06 )    Color: x / Appearance: x / SG: x / pH: x  Gluc: 171 mg/dL / Ketone: x  / Bili: x / Urobili: x   Blood: x / Protein: x / Nitrite: x   Leuk Esterase: x / RBC: x / WBC x   Sq Epi: x / Non Sq Epi: x / Bacteria: x      CAPILLARY BLOOD GLUCOSE      POCT Blood Glucose.: 154 mg/dL (03 Jun 2024 11:47)  POCT Blood Glucose.: 155 mg/dL (03 Jun 2024 07:57)  POCT Blood Glucose.: 190 mg/dL (02 Jun 2024 21:31)  POCT Blood Glucose.: 179 mg/dL (02 Jun 2024 17:01)        RADIOLOGY & ADDITIONAL TESTS:  < from: MR Head w/ CSF Flow, No Cont (05.30.24 @ 20:03) >  IMPRESSION: Ventricular enlargement which is unremarkable to sulcal   prominence which can be seen with normal pressure or communicating   hydrocephalus. Clinical correlation is required.    No acute intracranial hemorrhage or evidence of acute ischemia.    Multiple nonspecific abnormal white matter foci of T2/FLAIR prolongation   statistically favoring microvascular type changes.    Unchanged left-sided occipital and suboccipital soft tissue thickening.      < end of copied text >    Imaging Personally Reviewed:  YES    Consultant(s) Notes Reviewed:   YES    Care Discussed with Consultants :     Plan of care was discussed with patient and /or primary care giver; all questions and concerns were addressed and care was aligned with patient's wishes.

## 2024-06-03 NOTE — PROGRESS NOTE ADULT - PROBLEM SELECTOR PLAN 4
h/o dementia  unknown home meds  Continue mirtazapine  Continue seroquel  Haldol prn mild to moderate agitation/insomnia  Maintain safety measures  Supportive measures.   Psyche following

## 2024-06-04 LAB
GLUCOSE BLDC GLUCOMTR-MCNC: 188 MG/DL — HIGH (ref 70–99)
GLUCOSE BLDC GLUCOMTR-MCNC: 191 MG/DL — HIGH (ref 70–99)
GLUCOSE BLDC GLUCOMTR-MCNC: 219 MG/DL — HIGH (ref 70–99)
GLUCOSE BLDC GLUCOMTR-MCNC: 230 MG/DL — HIGH (ref 70–99)
GLUCOSE BLDC GLUCOMTR-MCNC: 250 MG/DL — HIGH (ref 70–99)

## 2024-06-04 RX ADMIN — ATORVASTATIN CALCIUM 80 MILLIGRAM(S): 80 TABLET, FILM COATED ORAL at 21:32

## 2024-06-04 RX ADMIN — Medication 0: at 21:35

## 2024-06-04 RX ADMIN — MIRTAZAPINE 7.5 MILLIGRAM(S): 45 TABLET, ORALLY DISINTEGRATING ORAL at 21:31

## 2024-06-04 RX ADMIN — QUETIAPINE FUMARATE 50 MILLIGRAM(S): 200 TABLET, FILM COATED ORAL at 21:31

## 2024-06-04 RX ADMIN — CARVEDILOL PHOSPHATE 12.5 MILLIGRAM(S): 80 CAPSULE, EXTENDED RELEASE ORAL at 06:12

## 2024-06-04 RX ADMIN — CARVEDILOL PHOSPHATE 12.5 MILLIGRAM(S): 80 CAPSULE, EXTENDED RELEASE ORAL at 17:44

## 2024-06-04 RX ADMIN — INSULIN GLARGINE 20 UNIT(S): 100 INJECTION, SOLUTION SUBCUTANEOUS at 21:34

## 2024-06-04 RX ADMIN — Medication 1: at 08:16

## 2024-06-04 RX ADMIN — GABAPENTIN 300 MILLIGRAM(S): 400 CAPSULE ORAL at 13:09

## 2024-06-04 RX ADMIN — PANTOPRAZOLE SODIUM 40 MILLIGRAM(S): 20 TABLET, DELAYED RELEASE ORAL at 06:11

## 2024-06-04 RX ADMIN — LATANOPROST 1 DROP(S): 0.05 SOLUTION/ DROPS OPHTHALMIC; TOPICAL at 21:32

## 2024-06-04 RX ADMIN — Medication 1: at 17:43

## 2024-06-04 RX ADMIN — AMLODIPINE BESYLATE 5 MILLIGRAM(S): 2.5 TABLET ORAL at 17:44

## 2024-06-04 RX ADMIN — Medication 2: at 13:06

## 2024-06-04 RX ADMIN — Medication 5 UNIT(S): at 12:26

## 2024-06-04 RX ADMIN — Medication 5 UNIT(S): at 17:43

## 2024-06-04 RX ADMIN — Medication 5 UNIT(S): at 08:15

## 2024-06-04 RX ADMIN — Medication 1 APPLICATION(S): at 06:12

## 2024-06-04 RX ADMIN — Medication 1 APPLICATION(S): at 17:44

## 2024-06-04 RX ADMIN — Medication 1 APPLICATION(S): at 13:09

## 2024-06-04 RX ADMIN — LISINOPRIL 20 MILLIGRAM(S): 2.5 TABLET ORAL at 06:12

## 2024-06-04 NOTE — DISCHARGE NOTE PROVIDER - NSDCMRMEDTOKEN_GEN_ALL_CORE_FT
amLODIPine 5 mg oral tablet: 1 tab(s) orally once a day  carvedilol 12.5 mg oral tablet: 1 tab(s) orally 2 times a day  Combigan 0.2%-0.5% ophthalmic solution: 1 drop(s) in the left eye 2 times a day  Crestor 20 mg oral tablet: 1 tab(s) orally once a day  gabapentin 300 mg oral tablet: orally once a day  Lantus Solostar Pen 100 units/mL subcutaneous solution: 40 subcutaneous once a day (in the evening)  Lasix 20 mg oral tablet: 1 tab(s) orally every other day  latanoprost 0.005% ophthalmic solution: 1 drop(s) in each eye once a day  lisinopril 20 mg oral tablet: 1 tab(s) orally once a day  metFORMIN 500 mg oral tablet: 1 tab(s) orally 2 times a day  mirtazapine 7.5 mg oral tablet: 1 tab(s) orally once a day (at bedtime)  NovoLOG FlexPen 100 units/mL injectable solution: 14 injectable 2 times a day before breakfast and lunch  NovoLOG FlexPen 100 units/mL injectable solution: 10 injectable once a day (with meals) before dinner  pantoprazole 40 mg oral delayed release tablet: 1 tab(s) orally once a day  Vitamin D3 50 mcg (2000 intl units) oral capsule: 1 cap(s) orally once a day   acetaminophen 325 mg oral tablet: 2 tab(s) orally every 6 hours As needed Temp greater or equal to 38C (100.4F), Mild Pain (1 - 3)  aluminum hydroxide-magnesium hydroxide 200 mg-200 mg/5 mL oral suspension: 30 milliliter(s) orally every 4 hours As needed Dyspepsia  amLODIPine 5 mg oral tablet: 1 tab(s) orally once a day  atorvastatin 80 mg oral tablet: 1 tab(s) orally once a day (at bedtime)  carvedilol 12.5 mg oral tablet: 1 tab(s) orally 2 times a day  Combigan 0.2%-0.5% ophthalmic solution: 1 drop(s) in the left eye 2 times a day  gabapentin 300 mg oral tablet: orally once a day  insulin glargine 100 units/mL subcutaneous solution: 32 unit(s) subcutaneous once a day (at bedtime)  insulin lispro 100 units/mL injectable solution: 8 unit(s) injectable 3 times a day (with meals)  Lasix 20 mg oral tablet: 1 tab(s) orally every other day  latanoprost 0.005% ophthalmic solution: 1 drop(s) in each eye once a day  lisinopril 20 mg oral tablet: 1 tab(s) orally once a day  melatonin 3 mg oral tablet: 1 tab(s) orally once a day (at bedtime) As needed Insomnia  mirtazapine 7.5 mg oral tablet: 1 tab(s) orally once a day (at bedtime)  NovoLOG FlexPen 100 units/mL injectable solution: 10 injectable once a day (with meals) before dinner  pantoprazole 40 mg oral delayed release tablet: 1 tab(s) orally once a day  polyethylene glycol 3350 oral powder for reconstitution: 17 gram(s) orally once a day  QUEtiapine 50 mg oral tablet: 1 tab(s) orally once a day (at bedtime)  QUEtiapine 50 mg oral tablet: 1 tab(s) orally every 6 hours As needed mild to moderate agitation/insomnia  senna leaf extract oral tablet: 2 tab(s) orally once a day (at bedtime)  Vitamin D3 50 mcg (2000 intl units) oral capsule: 1 cap(s) orally once a day

## 2024-06-04 NOTE — CONSULT NOTE ADULT - SUBJECTIVE AND OBJECTIVE BOX
Patient is a 74y old  Male who presents with a chief complaint of AMS (04 Jun 2024 09:27)      HPI:  74M PMH dementia, DM, and HTN presenting to the ED with AMS. Pt seen at bedside AAOX2, states he does not know why he was brought to the hospital, but was complaining of bilateral knee pain. Daughter called for collateral information, reports that for the past week, her father has become more confused and aggressive at home, pulling out electrical wires at home and trying to flush his clothes down the toilet. He has a HHA during the day for 12 hours x 5 days and his granddaughter stays the rest of the night with him, however, he has been becoming more aggressive and cursing out the granddaughter. He has been having urinary and bowel incontinence. He is blind in both eyes and ambulates with a walker however has been losing his balance lately with no falls. He denies any fevers, chills, CP, SOB, N/V/D, abdominal pain, dysuria, numbness/tingling in extremities.      (30 May 2024 10:14)      PAST MEDICAL & SURGICAL HISTORY:         MEDICATIONS  (STANDING):  amLODIPine   Tablet 5 milliGRAM(s) Oral every 24 hours  atorvastatin 80 milliGRAM(s) Oral at bedtime  carvedilol 12.5 milliGRAM(s) Oral every 12 hours  enoxaparin Injectable 40 milliGRAM(s) SubCutaneous every 24 hours  erythromycin   Ointment 1 Application(s) Both EYES four times a day  gabapentin 300 milliGRAM(s) Oral daily  insulin glargine Injectable (LANTUS) 20 Unit(s) SubCutaneous at bedtime  insulin lispro (ADMELOG) corrective regimen sliding scale   SubCutaneous three times a day before meals  insulin lispro (ADMELOG) corrective regimen sliding scale   SubCutaneous at bedtime  insulin lispro Injectable (ADMELOG) 5 Unit(s) SubCutaneous three times a day before meals  latanoprost 0.005% Ophthalmic Solution 1 Drop(s) Both EYES at bedtime  lisinopril 20 milliGRAM(s) Oral daily  mirtazapine 7.5 milliGRAM(s) Oral at bedtime  pantoprazole    Tablet 40 milliGRAM(s) Oral before breakfast  QUEtiapine 50 milliGRAM(s) Oral at bedtime    MEDICATIONS  (PRN):  acetaminophen     Tablet .. 650 milliGRAM(s) Oral every 6 hours PRN Temp greater or equal to 38C (100.4F), Mild Pain (1 - 3)  aluminum hydroxide/magnesium hydroxide/simethicone Suspension 30 milliLiter(s) Oral every 4 hours PRN Dyspepsia  haloperidol    Injectable 1 milliGRAM(s) IntraMuscular every 8 hours PRN Agitation  melatonin 3 milliGRAM(s) Oral at bedtime PRN Insomnia  ondansetron Injectable 4 milliGRAM(s) IV Push every 8 hours PRN Nausea and/or Vomiting  QUEtiapine 50 milliGRAM(s) Oral every 6 hours PRN mild to moderate agitation/insomnia      FAMILY HISTORY:      SOCIAL HISTORY:      REVIEW OF SYSTEMS:  CONSTITUTIONAL: No fever, weight loss, or fatigue  EYES: No eye pain, visual disturbances, or discharge  ENT:  No difficulty hearing, tinnitus, vertigo; No sinus or throat pain  NECK: No pain or stiffness  RESPIRATORY: No cough, wheezing, chills or hemoptysis; No Shortness of Breath  CARDIOVASCULAR: No chest pain, palpitations, passing out, dizziness, or leg swelling  GASTROINTESTINAL: No abdominal or epigastric pain. No nausea, vomiting, or hematemesis; No diarrhea or constipation. No melena or hematochezia.  GENITOURINARY: No dysuria, frequency, hematuria, or incontinence  NEUROLOGICAL: No headaches, memory loss, loss of strength, numbness, or tremors  SKIN: No itching, burning, rashes, or lesions   LYMPH Nodes: No enlarged glands  ENDOCRINE: No heat or cold intolerance; No hair loss  MUSCULOSKELETAL: No joint pain or swelling; No muscle, back, or extremity pain  PSYCHIATRIC: No depression, anxiety, mood swings, or difficulty sleeping  HEME/LYMPH: No easy bruising, or bleeding gums  ALLERGY AND IMMUNOLOGIC: No hives or eczema	        Vital Signs Last 24 Hrs  T(C): 37.5 (04 Jun 2024 05:27), Max: 37.5 (04 Jun 2024 05:27)  T(F): 99.5 (04 Jun 2024 05:27), Max: 99.5 (04 Jun 2024 05:27)  HR: 84 (04 Jun 2024 05:27) (52 - 88)  BP: 135/63 (04 Jun 2024 05:27) (110/59 - 143/67)  BP(mean): 65 (03 Jun 2024 15:10) (65 - 65)  RR: 15 (04 Jun 2024 05:27) (15 - 18)  SpO2: 93% (04 Jun 2024 05:27) (93% - 97%)    Parameters below as of 04 Jun 2024 05:27  Patient On (Oxygen Delivery Method): room air          Constitutional:    NC/AT:    HEENT:    Neck:  No JVD, bruits or thyromegaly    Respiratory:  Clear without rales or rhonchi    Cardiovascular:  RR without murmur, rub or gallop.    Gastrointestinal: Soft without hepatosplenomegaly.    Extremities: without cyanosis, clubbing or edema.    Neurological:  Oriented   x      . No gross sensory or motor defects.        LABS:                        14.2   12.90 )-----------( 238      ( 03 Jun 2024 05:06 )             43.9     06-03    140  |  110<H>  |  19<H>  ----------------------------<  171<H>  3.9   |  20<L>  |  1.13    Ca    8.9      03 Jun 2024 05:06  Mg     2.0     06-03            Urinalysis Basic - ( 03 Jun 2024 05:06 )    Color: x / Appearance: x / SG: x / pH: x  Gluc: 171 mg/dL / Ketone: x  / Bili: x / Urobili: x   Blood: x / Protein: x / Nitrite: x   Leuk Esterase: x / RBC: x / WBC x   Sq Epi: x / Non Sq Epi: x / Bacteria: x      CAPILLARY BLOOD GLUCOSE      POCT Blood Glucose.: 191 mg/dL (04 Jun 2024 07:48)  POCT Blood Glucose.: 236 mg/dL (03 Jun 2024 21:05)  POCT Blood Glucose.: 180 mg/dL (03 Jun 2024 16:45)  POCT Blood Glucose.: 154 mg/dL (03 Jun 2024 11:47)      RADIOLOGY & ADDITIONAL STUDIES: Patient is a 74y old  Male who presents with a chief complaint of AMS (04 Jun 2024 09:27)      HPI:  74M PMH dementia, DM, and HTN presenting to the ED with AMS. Pt seen at bedside AAOX2, states he does not know why he was brought to the hospital, but was complaining of bilateral knee pain. Daughter called for collateral information, reports that for the past week, her father has become more confused and aggressive at home, pulling out electrical wires at home and trying to flush his clothes down the toilet. He has a HHA during the day for 12 hours x 5 days and his granddaughter stays the rest of the night with him, however, he has been becoming more aggressive and cursing out the granddaughter. He has been having urinary and bowel incontinence. He is blind in both eyes and ambulates with a walker however has been losing his balance lately with no falls. He denies any fevers, chills, CP, SOB, N/V/D, abdominal pain, dysuria, numbness/tingling in extremities. Found to have uncont dm. Pt admits to taking meds and insulin as out pt- given by family. does not know fsg #    PAST MEDICAL & SURGICAL HISTORY:         MEDICATIONS  (STANDING):  amLODIPine   Tablet 5 milliGRAM(s) Oral every 24 hours  atorvastatin 80 milliGRAM(s) Oral at bedtime  carvedilol 12.5 milliGRAM(s) Oral every 12 hours  enoxaparin Injectable 40 milliGRAM(s) SubCutaneous every 24 hours  erythromycin   Ointment 1 Application(s) Both EYES four times a day  gabapentin 300 milliGRAM(s) Oral daily  insulin glargine Injectable (LANTUS) 20 Unit(s) SubCutaneous at bedtime  insulin lispro (ADMELOG) corrective regimen sliding scale   SubCutaneous three times a day before meals  insulin lispro (ADMELOG) corrective regimen sliding scale   SubCutaneous at bedtime  insulin lispro Injectable (ADMELOG) 5 Unit(s) SubCutaneous three times a day before meals  latanoprost 0.005% Ophthalmic Solution 1 Drop(s) Both EYES at bedtime  lisinopril 20 milliGRAM(s) Oral daily  mirtazapine 7.5 milliGRAM(s) Oral at bedtime  pantoprazole    Tablet 40 milliGRAM(s) Oral before breakfast  QUEtiapine 50 milliGRAM(s) Oral at bedtime    MEDICATIONS  (PRN):  acetaminophen     Tablet .. 650 milliGRAM(s) Oral every 6 hours PRN Temp greater or equal to 38C (100.4F), Mild Pain (1 - 3)  aluminum hydroxide/magnesium hydroxide/simethicone Suspension 30 milliLiter(s) Oral every 4 hours PRN Dyspepsia  haloperidol    Injectable 1 milliGRAM(s) IntraMuscular every 8 hours PRN Agitation  melatonin 3 milliGRAM(s) Oral at bedtime PRN Insomnia  ondansetron Injectable 4 milliGRAM(s) IV Push every 8 hours PRN Nausea and/or Vomiting  QUEtiapine 50 milliGRAM(s) Oral every 6 hours PRN mild to moderate agitation/insomnia      FAMILY HISTORY:      SOCIAL HISTORY:      REVIEW OF SYSTEMS: NA	        Vital Signs Last 24 Hrs  T(C): 37.5 (04 Jun 2024 05:27), Max: 37.5 (04 Jun 2024 05:27)  T(F): 99.5 (04 Jun 2024 05:27), Max: 99.5 (04 Jun 2024 05:27)  HR: 84 (04 Jun 2024 05:27) (52 - 88)  BP: 135/63 (04 Jun 2024 05:27) (110/59 - 143/67)  BP(mean): 65 (03 Jun 2024 15:10) (65 - 65)  RR: 15 (04 Jun 2024 05:27) (15 - 18)  SpO2: 93% (04 Jun 2024 05:27) (93% - 97%)    Parameters below as of 04 Jun 2024 05:27  Patient On (Oxygen Delivery Method): room air          Constitutional:    HEENT: nad    Neck:  No JVD, bruits or thyromegaly    Respiratory:  Clear without rales or rhonchi    Cardiovascular:  RR without murmur, rub or gallop.    Gastrointestinal: Soft without hepatosplenomegaly.    Extremities: without cyanosis, clubbing or edema.    Neurological:  Oriented   x 1-2     . No gross sensory or motor defects.        LABS:                        14.2   12.90 )-----------( 238      ( 03 Jun 2024 05:06 )             43.9     06-03    140  |  110<H>  |  19<H>  ----------------------------<  171<H>  3.9   |  20<L>  |  1.13    Ca    8.9      03 Jun 2024 05:06  Mg     2.0     06-03            Urinalysis Basic - ( 03 Jun 2024 05:06 )    Color: x / Appearance: x / SG: x / pH: x  Gluc: 171 mg/dL / Ketone: x  / Bili: x / Urobili: x   Blood: x / Protein: x / Nitrite: x   Leuk Esterase: x / RBC: x / WBC x   Sq Epi: x / Non Sq Epi: x / Bacteria: x      CAPILLARY BLOOD GLUCOSE      POCT Blood Glucose.: 191 mg/dL (04 Jun 2024 07:48)  POCT Blood Glucose.: 236 mg/dL (03 Jun 2024 21:05)  POCT Blood Glucose.: 180 mg/dL (03 Jun 2024 16:45)  POCT Blood Glucose.: 154 mg/dL (03 Jun 2024 11:47)      RADIOLOGY & ADDITIONAL STUDIES:

## 2024-06-04 NOTE — PROGRESS NOTE ADULT - SUBJECTIVE AND OBJECTIVE BOX
NP Note discussed with  primary attending    Patient is a 74y old  Male who presents with a chief complaint of AMS (04 Jun 2024 12:02)      INTERVAL HPI/OVERNIGHT EVENTS: no new complaints    MEDICATIONS  (STANDING):  amLODIPine   Tablet 5 milliGRAM(s) Oral every 24 hours  atorvastatin 80 milliGRAM(s) Oral at bedtime  carvedilol 12.5 milliGRAM(s) Oral every 12 hours  erythromycin   Ointment 1 Application(s) Both EYES four times a day  gabapentin 300 milliGRAM(s) Oral daily  insulin glargine Injectable (LANTUS) 20 Unit(s) SubCutaneous at bedtime  insulin lispro (ADMELOG) corrective regimen sliding scale   SubCutaneous three times a day before meals  insulin lispro (ADMELOG) corrective regimen sliding scale   SubCutaneous at bedtime  insulin lispro Injectable (ADMELOG) 5 Unit(s) SubCutaneous three times a day before meals  latanoprost 0.005% Ophthalmic Solution 1 Drop(s) Both EYES at bedtime  lisinopril 20 milliGRAM(s) Oral daily  mirtazapine 7.5 milliGRAM(s) Oral at bedtime  pantoprazole    Tablet 40 milliGRAM(s) Oral before breakfast  QUEtiapine 50 milliGRAM(s) Oral at bedtime    MEDICATIONS  (PRN):  acetaminophen     Tablet .. 650 milliGRAM(s) Oral every 6 hours PRN Temp greater or equal to 38C (100.4F), Mild Pain (1 - 3)  aluminum hydroxide/magnesium hydroxide/simethicone Suspension 30 milliLiter(s) Oral every 4 hours PRN Dyspepsia  haloperidol    Injectable 1 milliGRAM(s) IntraMuscular every 8 hours PRN Agitation  melatonin 3 milliGRAM(s) Oral at bedtime PRN Insomnia  ondansetron Injectable 4 milliGRAM(s) IV Push every 8 hours PRN Nausea and/or Vomiting  QUEtiapine 50 milliGRAM(s) Oral every 6 hours PRN mild to moderate agitation/insomnia      __________________________________________________  REVIEW OF SYSTEMS: Unable to obtain ROS due to impaired mental status    CONSTITUTIONAL: No fever,   Vital Signs Last 24 Hrs  T(C): 36.9 (04 Jun 2024 14:32), Max: 37.5 (04 Jun 2024 05:27)  T(F): 98.4 (04 Jun 2024 14:32), Max: 99.5 (04 Jun 2024 05:27)  HR: 68 (04 Jun 2024 14:32) (68 - 85)  BP: 114/61 (04 Jun 2024 14:32) (110/59 - 135/63)  BP(mean): --  RR: 16 (04 Jun 2024 14:32) (15 - 18)  SpO2: 95% (04 Jun 2024 14:32) (93% - 97%)    Parameters below as of 04 Jun 2024 14:32  Patient On (Oxygen Delivery Method): room air        ________________________________________________  PHYSICAL EXAM:  GENERAL: NAD  HEENT: Normocephalic;  conjunctivae and sclerae clear; moist mucous membranes;   NECK : supple  CHEST/LUNG: Clear to ausculitation bilaterally with good air entry   HEART: S1 S2  regular; no murmurs, gallops or rubs  ABDOMEN: Soft, Nontender, Nondistended; Bowel sounds present  EXTREMITIES: no cyanosis; no edema; no calf tenderness  SKIN: warm and dry; no rash  NERVOUS SYSTEM:  A&OX1    _________________________________________________  LABS:                        14.2   12.90 )-----------( 238      ( 03 Jun 2024 05:06 )             43.9     06-03    140  |  110<H>  |  19<H>  ----------------------------<  171<H>  3.9   |  20<L>  |  1.13    Ca    8.9      03 Jun 2024 05:06  Mg     2.0     06-03        Urinalysis Basic - ( 03 Jun 2024 05:06 )    Color: x / Appearance: x / SG: x / pH: x  Gluc: 171 mg/dL / Ketone: x  / Bili: x / Urobili: x   Blood: x / Protein: x / Nitrite: x   Leuk Esterase: x / RBC: x / WBC x   Sq Epi: x / Non Sq Epi: x / Bacteria: x      CAPILLARY BLOOD GLUCOSE      POCT Blood Glucose.: 230 mg/dL (04 Jun 2024 12:51)  POCT Blood Glucose.: 250 mg/dL (04 Jun 2024 11:24)  POCT Blood Glucose.: 191 mg/dL (04 Jun 2024 07:48)  POCT Blood Glucose.: 236 mg/dL (03 Jun 2024 21:05)  POCT Blood Glucose.: 180 mg/dL (03 Jun 2024 16:45)        RADIOLOGY & ADDITIONAL TESTS:    Imaging Personally Reviewed:  YES    Consultant(s) Notes Reviewed:   YES    Care Discussed with Consultants :     Plan of care was discussed with patient and /or primary care giver; all questions and concerns were addressed and care was aligned with patient's wishes.

## 2024-06-04 NOTE — PROGRESS NOTE ADULT - CONVERSATION DETAILS
CASE DISCUSSED AT LENGTH WITH PATIENT'S DAUGHTER CARLOS - ALL QUESTIONS ANSWERED. DISCUSSED THAT PROGNOSIS IS POOR/GUARDED. DAUGHTER VERBALIZED UNDERSTANDING. IN DISCUSSION REGARDING GOC - SHE CONVEYS FAMILY WISHES FOR FULL CODE.

## 2024-06-04 NOTE — DISCHARGE NOTE PROVIDER - HOSPITAL COURSE
74M PMH dementia, DM, and HTN presenting to the ED with AMS admitted for acute encephalopathy 2/2 worsening dementia vs NPH  CTH: Motion limited exam. Ventricular prominence with findings suggesting normal pressure hydrocephalus. No mass effect or hemorrhage identified. Left occipital scalp soft tissue thickening. UA neg, UTOx neg Neurology & psych consulted.   Psych recommends to continue Quetiapine , increase to 50mg po at 8-9pm. Increase PRN's to: Seroquel 50 mg PO q 6 hrs PRN for mild to moderate agitation/insomnia  Haldol 1 mg IM q 8 hrs PRN for severe agitation. Monitor QTC per EKG while on antipsychotic regimen, & maintain safety measures.  Patient also noted with A1c 11.7. Endo consulted.    MRI brain with CSF flow non-con: No acute findings. Patient's family declined Lumbar puncture offered by neurology.  PT recommends MUSHTAQ. 74M PMH dementia, DM, and HTN presenting to the ED with AMS admitted for acute encephalopathy 2/2 worsening dementia vs NPH  CTH: Motion limited exam. Ventricular prominence with findings suggesting normal pressure hydrocephalus. No mass effect or hemorrhage identified. Left occipital scalp soft tissue thickening. UA neg, UTOx neg Neurology & psych consulted.   Psych recommends to continue Quetiapine , increase to 50mg po at 8-9pm. Increase PRN's to: Seroquel 50 mg PO q 6 hrs PRN for mild to moderate agitation/insomnia  Haldol 1 mg IM q 8 hrs PRN for severe agitation. Monitor QTC per EKG while on antipsychotic regimen, & maintain safety measures.  Patient also noted with A1c 11.7. Endo consulted.    MRI brain with CSF flow non-con: No acute findings. Patient underwent Lumbar puncture 6/5, findings inconclusive.  Pt. to f/u with OP neuro.    PT recommends MUSHTAQ. Pt. was accepted to [facility], medically stable for discharge.

## 2024-06-04 NOTE — PROGRESS NOTE ADULT - PROBLEM SELECTOR PLAN 4
h/o dementia  unknown home meds  Continue mirtazapine  Continue Seroquel  Haldol prn mild to moderate agitation/insomnia  Maintain safety measures  Supportive measures.   Psych following

## 2024-06-04 NOTE — DISCHARGE NOTE PROVIDER - NSDCCPCAREPLAN_GEN_ALL_CORE_FT
PRINCIPAL DISCHARGE DIAGNOSIS  Diagnosis: Acute encephalopathy  Assessment and Plan of Treatment: - You presented with altered mental status likely due to worsening dementia versus NPH.  - You were evaluated by psychiatrist & neurologist   - Your medications were adjusted  MRi of your head is negative for acute intracrannial findings.  - You were also evaluated by a neurologist, who recommended a lumbar puncture. However, your family declined it.   - You were seen a physical therapist recommending rehab placement      SECONDARY DISCHARGE DIAGNOSES  Diagnosis: Dementia  Assessment and Plan of Treatment: - Plan is same as above    Diagnosis: HTN (hypertension)  Assessment and Plan of Treatment: - You have a history of high blood pressure  - Continue to take your b/p medications as prescribed  - Follow a Low salt diet  - Engage in activity as tolerated.  - Take all medication as prescribed.  - Follow up with your medical doctor for routine blood pressure monitoring at your next visit.  - Notify your doctor if you have any of the following symptoms:   Dizziness, Lightheadedness, Blurry vision, Headache, Chest pain, Shortness of breath      Diagnosis: DM (diabetes mellitus)  Assessment and Plan of Treatment: - You have a history of diabetes, & found to have an elevated A1C of 11.7.  - You were evaluated by an endocrine doctor  Make sure you get your HgA1c checked every three months.  If you take oral diabetes medications, check your blood glucose two times a day.  If you take insulin, check your blood glucose before meals and at bedtime.  It's important not to skip any meals.  Keep a log of your blood glucose results and always take it with you to your doctor appointments.  Keep a list of your current medications including injectables and over the counter medications and bring this medication list with you to all your doctor appointments.  If you have not seen your ophthalmologist this year call for appointment.  Check your feet daily for redness, sores, or openings. Do not self treat. If no improvement in two days call your primary care physician for an appointment.  Low blood sugar (hypoglycemia) is a blood sugar below 70mg/dl. Check your blood sugar if you feel signs/symptoms of hypoglycemia. If your blood sugar is below 70 take 15 grams of carbohydrates (ex 4 oz of apple juice, 3-4 glucose tablets, or 4-6 oz of regular soda) wait 15 minutes and repeat blood sugar to make sure it comes up above 70.  If your blood sugar is above 70 and you are due for a meal, have a meal.  If you are not due for a meal have a snack.  This snack helps keeps your blood sugar at a safe range.       PRINCIPAL DISCHARGE DIAGNOSIS  Diagnosis: Acute encephalopathy  Assessment and Plan of Treatment: - You presented with altered mental status likely due to worsening dementia versus NPH.  - You were evaluated by psychiatrist & neurologist   - Your medications were adjusted  MRi of your head is negative for acute intracrannial findings.  - You were also evaluated by a neurologist, who recommended a lumbar puncture.  -An LP was performed 6/5, results inconclusive  - You were seen a physical therapist recommending rehab placement  -FOLLOW UP WITH OUTPATIENT NEUROLOGY      SECONDARY DISCHARGE DIAGNOSES  Diagnosis: Dementia  Assessment and Plan of Treatment: - Plan is same as above    Diagnosis: HTN (hypertension)  Assessment and Plan of Treatment: - You have a history of high blood pressure  - Continue to take your b/p medications as prescribed  - Follow a Low salt diet  - Engage in activity as tolerated.  - Take all medication as prescribed.  - Follow up with your medical doctor for routine blood pressure monitoring at your next visit.  - Notify your doctor if you have any of the following symptoms:   Dizziness, Lightheadedness, Blurry vision, Headache, Chest pain, Shortness of breath      Diagnosis: DM (diabetes mellitus)  Assessment and Plan of Treatment: - You have a history of diabetes, & found to have an elevated A1C of 11.7.  - You were evaluated by an endocrine doctor  Make sure you get your HgA1c checked every three months.  If you take oral diabetes medications, check your blood glucose two times a day.  If you take insulin, check your blood glucose before meals and at bedtime.  It's important not to skip any meals.  Keep a log of your blood glucose results and always take it with you to your doctor appointments.  Keep a list of your current medications including injectables and over the counter medications and bring this medication list with you to all your doctor appointments.  If you have not seen your ophthalmologist this year call for appointment.  Check your feet daily for redness, sores, or openings. Do not self treat. If no improvement in two days call your primary care physician for an appointment.  Low blood sugar (hypoglycemia) is a blood sugar below 70mg/dl. Check your blood sugar if you feel signs/symptoms of hypoglycemia. If your blood sugar is below 70 take 15 grams of carbohydrates (ex 4 oz of apple juice, 3-4 glucose tablets, or 4-6 oz of regular soda) wait 15 minutes and repeat blood sugar to make sure it comes up above 70.  If your blood sugar is above 70 and you are due for a meal, have a meal.  If you are not due for a meal have a snack.  This snack helps keeps your blood sugar at a safe range.

## 2024-06-04 NOTE — DISCHARGE NOTE PROVIDER - PROVIDER TOKENS
PROVIDER:[TOKEN:[[id number]],FOLLOWUP:[1 week]],PROVIDER:[TOKEN:[[id number]],FOLLOWUP:[1 week]]

## 2024-06-04 NOTE — DISCHARGE NOTE PROVIDER - NSDCADMDATE_GEN_ALL_CORE_FT
For information on Fall & Injury Prevention, visit [email or web address]
30-May-2024 08:35

## 2024-06-04 NOTE — CONSULT NOTE ADULT - ASSESSMENT
74M PMH dementia, DM, and HTN presenting to the ED with AMS. Found to have uncont dm. Pt admits to taking meds and insulin as out pt- given by family. does not know fsg #

## 2024-06-04 NOTE — PROGRESS NOTE ADULT - SUBJECTIVE AND OBJECTIVE BOX
Patient is a 74y old  Male who presents with a chief complaint of AMS (03 Jun 2024 15:45)    PATIENT IS SEEN AND EXAMINED IN MEDICAL FLOOR.  JETT [    ]    SERGIO [   ]      GT [   ]    ALLERGIES:  No Known Allergies      Daily     Daily     VITALS:    Vital Signs Last 24 Hrs  T(C): 37.5 (04 Jun 2024 05:27), Max: 37.5 (04 Jun 2024 05:27)  T(F): 99.5 (04 Jun 2024 05:27), Max: 99.5 (04 Jun 2024 05:27)  HR: 84 (04 Jun 2024 05:27) (52 - 88)  BP: 135/63 (04 Jun 2024 05:27) (110/59 - 143/67)  BP(mean): 65 (03 Jun 2024 15:10) (65 - 65)  RR: 15 (04 Jun 2024 05:27) (15 - 18)  SpO2: 93% (04 Jun 2024 05:27) (93% - 97%)    Parameters below as of 04 Jun 2024 05:27  Patient On (Oxygen Delivery Method): room air        LABS:    CBC Full  -  ( 03 Jun 2024 05:06 )  WBC Count : 12.90 K/uL  RBC Count : 4.91 M/uL  Hemoglobin : 14.2 g/dL  Hematocrit : 43.9 %  Platelet Count - Automated : 238 K/uL  Mean Cell Volume : 89.4 fl  Mean Cell Hemoglobin : 28.9 pg  Mean Cell Hemoglobin Concentration : 32.3 gm/dL  Auto Neutrophil # : x  Auto Lymphocyte # : x  Auto Monocyte # : x  Auto Eosinophil # : x  Auto Basophil # : x  Auto Neutrophil % : x  Auto Lymphocyte % : x  Auto Monocyte % : x  Auto Eosinophil % : x  Auto Basophil % : x      06-03    140  |  110<H>  |  19<H>  ----------------------------<  171<H>  3.9   |  20<L>  |  1.13    Ca    8.9      03 Jun 2024 05:06  Mg     2.0     06-03      CAPILLARY BLOOD GLUCOSE      POCT Blood Glucose.: 191 mg/dL (04 Jun 2024 07:48)  POCT Blood Glucose.: 236 mg/dL (03 Jun 2024 21:05)  POCT Blood Glucose.: 180 mg/dL (03 Jun 2024 16:45)  POCT Blood Glucose.: 154 mg/dL (03 Jun 2024 11:47)          Creatinine Trend: 1.13<--, 1.51<--, 1.18<--, 1.56<--  I&O's Summary              MEDICATIONS:    MEDICATIONS  (STANDING):  amLODIPine   Tablet 5 milliGRAM(s) Oral every 24 hours  atorvastatin 80 milliGRAM(s) Oral at bedtime  carvedilol 12.5 milliGRAM(s) Oral every 12 hours  enoxaparin Injectable 40 milliGRAM(s) SubCutaneous every 24 hours  erythromycin   Ointment 1 Application(s) Both EYES four times a day  gabapentin 300 milliGRAM(s) Oral daily  insulin glargine Injectable (LANTUS) 20 Unit(s) SubCutaneous at bedtime  insulin lispro (ADMELOG) corrective regimen sliding scale   SubCutaneous three times a day before meals  insulin lispro (ADMELOG) corrective regimen sliding scale   SubCutaneous at bedtime  insulin lispro Injectable (ADMELOG) 5 Unit(s) SubCutaneous three times a day before meals  latanoprost 0.005% Ophthalmic Solution 1 Drop(s) Both EYES at bedtime  lisinopril 20 milliGRAM(s) Oral daily  mirtazapine 7.5 milliGRAM(s) Oral at bedtime  pantoprazole    Tablet 40 milliGRAM(s) Oral before breakfast  QUEtiapine 50 milliGRAM(s) Oral at bedtime      MEDICATIONS  (PRN):  acetaminophen     Tablet .. 650 milliGRAM(s) Oral every 6 hours PRN Temp greater or equal to 38C (100.4F), Mild Pain (1 - 3)  aluminum hydroxide/magnesium hydroxide/simethicone Suspension 30 milliLiter(s) Oral every 4 hours PRN Dyspepsia  haloperidol    Injectable 1 milliGRAM(s) IntraMuscular every 8 hours PRN Agitation  melatonin 3 milliGRAM(s) Oral at bedtime PRN Insomnia  ondansetron Injectable 4 milliGRAM(s) IV Push every 8 hours PRN Nausea and/or Vomiting  QUEtiapine 50 milliGRAM(s) Oral every 6 hours PRN mild to moderate agitation/insomnia      REVIEW OF SYSTEMS:                           ALL ROS DONE [ X   ]    CONSTITUTIONAL:  LETHARGIC [   ], FEVER [   ], UNRESPONSIVE [   ]  CVS:  CP  [   ], SOB, [   ], PALPITATIONS [   ], DIZZYNESS [   ]  RS: COUGH [   ], SPUTUM [   ]  GI: ABDOMINAL PAIN [   ], NAUSEA [   ], VOMITINGS [   ], DIARRHEA [   ], CONSTIPATION [   ]  :  DYSURIA [   ], NOCTURIA [   ], INCREASED FREQUENCY [   ], DRIBLING [   ],  SKELETAL: PAINFUL JOINTS [   ], SWOLLEN JOINTS [   ], NECK ACHE [   ], LOW BACK ACHE [   ],  SKIN : ULCERS [   ], RASH [   ], ITCHING [   ]  CNS: HEAD ACHE [   ], DOUBLE VISION [   ], BLURRED VISION [   ], AMS / CONFUSION [   ], SEIZURES [   ], WEAKNESS [   ],TINGLING / NUMBNESS [   ]        PHYSICAL EXAMINATION:    GENERAL APPEARANCE: NO DISTRESS  HEENT:  NO PALLOR, NO  JVD,  NO   NODES, NECK SUPPLE  CVS: S1 +, S2 +,   RS: AEEB,  OCCASIONAL  RALES +,   NO RONCHI  ABD: SOFT, NT, NO, BS +  EXT: NO PE  SKIN: WARM,   SKELETAL:  ROM ACCEPTABLE  CNS:  AAO X 1-2    RADIOLOGY :    RADIOLOGY AND READINGS REVIEWED    ASSESSMENT :     Altered mental status        PLAN:  HPI:  74M PMH dementia, DM, and HTN presenting to the ED with AMS. Pt seen at bedside AAOX2, states he does not know why he was brought to the hospital, but was complaining of bilateral knee pain. Daughter called for collateral information, reports that for the past week, her father has become more confused and aggressive at home, pulling out electrical wires at home and trying to flush his clothes down the toilet. He has a HHA during the day for 12 hours x 5 days and his granddaughter stays the rest of the night with him, however, he has been becoming more aggressive and cursing out the granddaughter. He has been having urinary and bowel incontinence. He is blind in both eyes and ambulates with a walker however has been losing his balance lately with no falls. He denies any fevers, chills, CP, SOB, N/V/D, abdominal pain, dysuria, numbness/tingling in extremities.      (30 May 2024 10:14)        # [6/3] CASE DW PATIENT'S DAUGHTER VELMA PONCE @ [phone] . PER DAUGHTER PATIENT HAS HAD WORSENING DEMENTIA AND HAS NOTED MOOD CHANGES. SHE ALSO CONVEYS HE IS NONCOMPLIANT WITH MEDICATION DESPITE SUPPORTIVE MEASURES. SHE CONVEYS THAT FAMILY IS AGREEABLE FOR MUSHTAQ.   - SHE AND FAMILY ARE DISCUSSING WHETHER TO PURSUE LP AT THIS TIME OR NOT. NEUROLOGY TEAM DISCUSSED LP AND FURTHER WORKUP WITH FAMILY - THEY ARE DISCUSSING AS A FAMILY REGARDING WHETHER TO PURSUE FURTHER WORKUP AT THIS TIME. THEY UNDERSTAND RISKS AND BENEFITS OF FURTHER EVALUATION. THEY ALSO UNDERSTAND RISKS OF DEFERRING EVALUATION. THEY WILL DISCUSS AS A FAMILY AND DISCUSS WITH NEUROLOGY TO LET THE TEAM KNOW THEIR WISHES.         #  TEAM TO COORDINATE MUSHTAQ PLACEMENT      # RESOLVED AMS     # LIKELY WORSENING VASCULAR DEMENTIA/CHRONIC WHITE MATTER DISEASE / CEREBRAL ISCHEMIA  # ? VENTRICULOMEGALY vs. NPH  - NOTED CT HEAD  - NOTED UA NEGATIVE  - NOTED UTOX NEGATIVE  - MRI BRAIN W/ CSF FLOW REVIEWED  - PSYCHIATRY CONSULT  - NEUROLOGY CONSULT    - ON REMERON  - ON SEROQUEL [UPTITRATING] PER PSYCH    - NEUROLOGY TEAM DISCUSSED LP AND FURTHER WORKUP WITH FAMILY - THEY ARE DISCUSSING AS A FAMILY REGARDING WHETHER TO PURSUE FURTHER WORKUP AT THIS TIME. THEY UNDERSTAND RISKS AND BENEFITS OF FURTHER EVALUATION. THEY ALSO UNDERSTAND RISKS OF DEFERRING EVALUATION.    # B/L KNEE PAIN - ?S/T OA  # IMPAIRED GAIT DUE TO GENERALIZED MUSCLE WEAKNESS, POLYARTHRITIS, DIABETIC PERIPHERAL NEUROPATHY  - PRN PAIN CONTROL  - F/U XRAYS B /L KNEES      # DIABETIC NEPHROPATHY  # VITA VS. CKD - IMPROVED  - MONITOR CR  - AVOID NEPHROTOXIC AGENTS    # DM  - HBA1C - 11.7   - SSI + FS    # HTN    # VISUALLY IMPAIRED, LIKELY DIABETIC RETINOPATHY     # GI AND DVT PPX     Patient is a 74y old  Male who presents with a chief complaint of AMS (03 Jun 2024 15:45)    PATIENT IS SEEN AND EXAMINED IN MEDICAL FLOOR.      ALLERGIES:  No Known Allergies        VITALS:    Vital Signs Last 24 Hrs  T(C): 37.5 (04 Jun 2024 05:27), Max: 37.5 (04 Jun 2024 05:27)  T(F): 99.5 (04 Jun 2024 05:27), Max: 99.5 (04 Jun 2024 05:27)  HR: 84 (04 Jun 2024 05:27) (52 - 88)  BP: 135/63 (04 Jun 2024 05:27) (110/59 - 143/67)  BP(mean): 65 (03 Jun 2024 15:10) (65 - 65)  RR: 15 (04 Jun 2024 05:27) (15 - 18)  SpO2: 93% (04 Jun 2024 05:27) (93% - 97%)    Parameters below as of 04 Jun 2024 05:27  Patient On (Oxygen Delivery Method): room air        LABS:    CBC Full  -  ( 03 Jun 2024 05:06 )  WBC Count : 12.90 K/uL  RBC Count : 4.91 M/uL  Hemoglobin : 14.2 g/dL  Hematocrit : 43.9 %  Platelet Count - Automated : 238 K/uL  Mean Cell Volume : 89.4 fl  Mean Cell Hemoglobin : 28.9 pg  Mean Cell Hemoglobin Concentration : 32.3 gm/dL  Auto Neutrophil # : x  Auto Lymphocyte # : x  Auto Monocyte # : x  Auto Eosinophil # : x  Auto Basophil # : x  Auto Neutrophil % : x  Auto Lymphocyte % : x  Auto Monocyte % : x  Auto Eosinophil % : x  Auto Basophil % : x      06-03    140  |  110<H>  |  19<H>  ----------------------------<  171<H>  3.9   |  20<L>  |  1.13    Ca    8.9      03 Jun 2024 05:06  Mg     2.0     06-03      CAPILLARY BLOOD GLUCOSE      POCT Blood Glucose.: 191 mg/dL (04 Jun 2024 07:48)  POCT Blood Glucose.: 236 mg/dL (03 Jun 2024 21:05)  POCT Blood Glucose.: 180 mg/dL (03 Jun 2024 16:45)  POCT Blood Glucose.: 154 mg/dL (03 Jun 2024 11:47)          Creatinine Trend: 1.13<--, 1.51<--, 1.18<--, 1.56<--  I&O's Summary              MEDICATIONS:    MEDICATIONS  (STANDING):  amLODIPine   Tablet 5 milliGRAM(s) Oral every 24 hours  atorvastatin 80 milliGRAM(s) Oral at bedtime  carvedilol 12.5 milliGRAM(s) Oral every 12 hours  enoxaparin Injectable 40 milliGRAM(s) SubCutaneous every 24 hours  erythromycin   Ointment 1 Application(s) Both EYES four times a day  gabapentin 300 milliGRAM(s) Oral daily  insulin glargine Injectable (LANTUS) 20 Unit(s) SubCutaneous at bedtime  insulin lispro (ADMELOG) corrective regimen sliding scale   SubCutaneous three times a day before meals  insulin lispro (ADMELOG) corrective regimen sliding scale   SubCutaneous at bedtime  insulin lispro Injectable (ADMELOG) 5 Unit(s) SubCutaneous three times a day before meals  latanoprost 0.005% Ophthalmic Solution 1 Drop(s) Both EYES at bedtime  lisinopril 20 milliGRAM(s) Oral daily  mirtazapine 7.5 milliGRAM(s) Oral at bedtime  pantoprazole    Tablet 40 milliGRAM(s) Oral before breakfast  QUEtiapine 50 milliGRAM(s) Oral at bedtime      MEDICATIONS  (PRN):  acetaminophen     Tablet .. 650 milliGRAM(s) Oral every 6 hours PRN Temp greater or equal to 38C (100.4F), Mild Pain (1 - 3)  aluminum hydroxide/magnesium hydroxide/simethicone Suspension 30 milliLiter(s) Oral every 4 hours PRN Dyspepsia  haloperidol    Injectable 1 milliGRAM(s) IntraMuscular every 8 hours PRN Agitation  melatonin 3 milliGRAM(s) Oral at bedtime PRN Insomnia  ondansetron Injectable 4 milliGRAM(s) IV Push every 8 hours PRN Nausea and/or Vomiting  QUEtiapine 50 milliGRAM(s) Oral every 6 hours PRN mild to moderate agitation/insomnia      REVIEW OF SYSTEMS:                           ALL ROS DONE [ X   ]    CONSTITUTIONAL:  LETHARGIC [   ], FEVER [   ], UNRESPONSIVE [   ]  CVS:  CP  [   ], SOB, [   ], PALPITATIONS [   ], DIZZYNESS [   ]  RS: COUGH [   ], SPUTUM [   ]  GI: ABDOMINAL PAIN [   ], NAUSEA [   ], VOMITINGS [   ], DIARRHEA [   ], CONSTIPATION [   ]  :  DYSURIA [   ], NOCTURIA [   ], INCREASED FREQUENCY [   ], DRIBLING [   ],  SKELETAL: PAINFUL JOINTS [   ], SWOLLEN JOINTS [   ], NECK ACHE [   ], LOW BACK ACHE [   ],  SKIN : ULCERS [   ], RASH [   ], ITCHING [   ]  CNS: HEAD ACHE [   ], DOUBLE VISION [   ], BLURRED VISION [   ], AMS / CONFUSION [   ], SEIZURES [   ], WEAKNESS [   ],TINGLING / NUMBNESS [   ]        PHYSICAL EXAMINATION:    GENERAL APPEARANCE: NO DISTRESS  HEENT:  NO PALLOR, NO  JVD,  NO   NODES, NECK SUPPLE  CVS: S1 +, S2 +,   RS: AEEB,  OCCASIONAL  RALES +,   NO RONCHI  ABD: SOFT, NT, NO, BS +  EXT: NO PE  SKIN: WARM,   SKELETAL:  ROM ACCEPTABLE  CNS:  AAO X 1-2    RADIOLOGY :    RADIOLOGY AND READINGS REVIEWED    ASSESSMENT :     Altered mental status        PLAN:  HPI:  74M PMH dementia, DM, and HTN presenting to the ED with AMS. Pt seen at bedside AAOX2, states he does not know why he was brought to the hospital, but was complaining of bilateral knee pain. Daughter called for collateral information, reports that for the past week, her father has become more confused and aggressive at home, pulling out electrical wires at home and trying to flush his clothes down the toilet. He has a HHA during the day for 12 hours x 5 days and his granddaughter stays the rest of the night with him, however, he has been becoming more aggressive and cursing out the granddaughter. He has been having urinary and bowel incontinence. He is blind in both eyes and ambulates with a walker however has been losing his balance lately with no falls. He denies any fevers, chills, CP, SOB, N/V/D, abdominal pain, dysuria, numbness/tingling in extremities.      (30 May 2024 10:14)        # [6/3] CASE DW PATIENT'S DAUGHTER CARLOS [CORRECTION FOR VELMA] ROSARIO @ [phone] . PER DAUGHTER PATIENT HAS HAD WORSENING DEMENTIA AND HAS NOTED MOOD CHANGES. SHE ALSO CONVEYS HE IS NONCOMPLIANT WITH MEDICATION DESPITE SUPPORTIVE MEASURES. SHE CONVEYS THAT FAMILY IS AGREEABLE FOR MUSHTAQ.   - SHE AND FAMILY ARE DISCUSSING WHETHER TO PURSUE LP AT THIS TIME OR NOT. NEUROLOGY TEAM DISCUSSED LP AND FURTHER WORKUP WITH FAMILY - THEY ARE DISCUSSING AS A FAMILY REGARDING WHETHER TO PURSUE FURTHER WORKUP AT THIS TIME. THEY UNDERSTAND RISKS AND BENEFITS OF FURTHER EVALUATION. THEY ALSO UNDERSTAND RISKS OF DEFERRING EVALUATION. THEY WILL DISCUSS AS A FAMILY AND DISCUSS WITH NEUROLOGY TO LET THE TEAM KNOW THEIR WISHES.   [6/4] CASE D/W DAUGHTER AT BEDSIDE - WISHES TO PURSUE LP PER DISCUSSION WITH MEDICAL TEAM AND NEUROLOGY TEAM        # CM TEAM TO COORDINATE MUSHTAQ PLACEMENT      # RESOLVED AMS     # LIKELY WORSENING VASCULAR DEMENTIA/CHRONIC WHITE MATTER DISEASE / CEREBRAL ISCHEMIA  # ? VENTRICULOMEGALY vs. NPH  - NOTED CT HEAD  - NOTED UA NEGATIVE  - NOTED UTOX NEGATIVE  - MRI BRAIN W/ CSF FLOW REVIEWED  - PSYCHIATRY CONSULT  - NEUROLOGY CONSULT    - ON REMERON  - ON SEROQUEL [UPTITRATING] PER PSYCH    - NEUROLOGY TEAM DISCUSSED LP AND FURTHER WORKUP WITH FAMILY - THEY ARE DISCUSSING AS A FAMILY REGARDING WHETHER TO PURSUE FURTHER WORKUP AT THIS TIME. THEY UNDERSTAND RISKS AND BENEFITS OF FURTHER EVALUATION. THEY ALSO UNDERSTAND RISKS OF DEFERRING EVALUATION.    - PLANNED FOR LP 6/5    # B/L KNEE PAIN -  LIKELY S/T OA - IMPROVED  # IMPAIRED GAIT DUE TO GENERALIZED MUSCLE WEAKNESS, POLYARTHRITIS, DIABETIC PERIPHERAL NEUROPATHY  - PRN PAIN CONTROL      # DIABETIC NEPHROPATHY  # VITA VS. CKD - IMPROVED  - MONITOR CR  - AVOID NEPHROTOXIC AGENTS    # DM  - HBA1C - 11.7   - SSI + FS  - ENDOCRINOLOGY CONSULT    # HTN    # VISUALLY IMPAIRED, LIKELY DIABETIC RETINOPATHY     # GI AND DVT PPX

## 2024-06-04 NOTE — DISCHARGE NOTE PROVIDER - DISCHARGE SERVICE FOR PATIENT
on the discharge service for the patient. I have reviewed and made amendments to the documentation where necessary. Imaging Studies/Labs

## 2024-06-04 NOTE — DISCHARGE NOTE PROVIDER - CARE PROVIDER_API CALL
Phuong Davis [facility]  Internal Medicine  [id number] [address] [place] [id number]  Phone: [phone]  Fax: [phone]  Follow Up Time: 1 week    Crescencio Louise  Neurosurgery  [address] [place] [address], [address]  [place] [address]  Phone: [phone]  Fax: [phone]  Follow Up Time: 1 week

## 2024-06-05 DIAGNOSIS — G91.2 (IDIOPATHIC) NORMAL PRESSURE HYDROCEPHALUS: ICD-10-CM

## 2024-06-05 LAB
ANION GAP SERPL CALC-SCNC: 9 MMOL/L — SIGNIFICANT CHANGE UP (ref 5–17)
APPEARANCE CSF: CLEAR — SIGNIFICANT CHANGE UP
APPEARANCE SPUN FLD: COLORLESS — SIGNIFICANT CHANGE UP
BUN SERPL-MCNC: 31 MG/DL — HIGH (ref 7–18)
CALCIUM SERPL-MCNC: 8.6 MG/DL — SIGNIFICANT CHANGE UP (ref 8.4–10.5)
CHLORIDE SERPL-SCNC: 110 MMOL/L — HIGH (ref 96–108)
CO2 SERPL-SCNC: 21 MMOL/L — LOW (ref 22–31)
COLOR CSF: SIGNIFICANT CHANGE UP
CREAT SERPL-MCNC: 1.46 MG/DL — HIGH (ref 0.5–1.3)
CSF COMMENTS: SIGNIFICANT CHANGE UP
EGFR: 50 ML/MIN/1.73M2 — LOW
GLUCOSE BLDC GLUCOMTR-MCNC: 198 MG/DL — HIGH (ref 70–99)
GLUCOSE BLDC GLUCOMTR-MCNC: 227 MG/DL — HIGH (ref 70–99)
GLUCOSE BLDC GLUCOMTR-MCNC: 235 MG/DL — HIGH (ref 70–99)
GLUCOSE BLDC GLUCOMTR-MCNC: 248 MG/DL — HIGH (ref 70–99)
GLUCOSE CSF-MCNC: 132 MG/DL — HIGH (ref 40–70)
GLUCOSE SERPL-MCNC: 219 MG/DL — HIGH (ref 70–99)
GRAM STN FLD: SIGNIFICANT CHANGE UP
HCT VFR BLD CALC: 39.7 % — SIGNIFICANT CHANGE UP (ref 39–50)
HGB BLD-MCNC: 12.9 G/DL — LOW (ref 13–17)
INR BLD: 1.05 RATIO — SIGNIFICANT CHANGE UP (ref 0.85–1.18)
LYMPHOCYTES # CSF: 100 % — HIGH (ref 40–80)
MCHC RBC-ENTMCNC: 28.8 PG — SIGNIFICANT CHANGE UP (ref 27–34)
MCHC RBC-ENTMCNC: 32.5 GM/DL — SIGNIFICANT CHANGE UP (ref 32–36)
MCV RBC AUTO: 88.6 FL — SIGNIFICANT CHANGE UP (ref 80–100)
NEUTROPHILS # CSF: 0 % — SIGNIFICANT CHANGE UP (ref 0–6)
NRBC # BLD: 0 /100 WBCS — SIGNIFICANT CHANGE UP (ref 0–0)
NRBC NFR CSF: 5 /UL — SIGNIFICANT CHANGE UP (ref 0–5)
PLATELET # BLD AUTO: 204 K/UL — SIGNIFICANT CHANGE UP (ref 150–400)
POTASSIUM SERPL-MCNC: 4.1 MMOL/L — SIGNIFICANT CHANGE UP (ref 3.5–5.3)
POTASSIUM SERPL-SCNC: 4.1 MMOL/L — SIGNIFICANT CHANGE UP (ref 3.5–5.3)
PROT CSF-MCNC: 41 MG/DL — SIGNIFICANT CHANGE UP (ref 15–45)
PROTHROM AB SERPL-ACNC: 11.9 SEC — SIGNIFICANT CHANGE UP (ref 9.5–13)
RBC # BLD: 4.48 M/UL — SIGNIFICANT CHANGE UP (ref 4.2–5.8)
RBC # CSF: 0 /UL — SIGNIFICANT CHANGE UP (ref 0–0)
RBC # FLD: 14.8 % — HIGH (ref 10.3–14.5)
SODIUM SERPL-SCNC: 140 MMOL/L — SIGNIFICANT CHANGE UP (ref 135–145)
SPECIMEN SOURCE: SIGNIFICANT CHANGE UP
TUBE TYPE: SIGNIFICANT CHANGE UP
WBC # BLD: 10.9 K/UL — HIGH (ref 3.8–10.5)
WBC # FLD AUTO: 10.9 K/UL — HIGH (ref 3.8–10.5)

## 2024-06-05 PROCEDURE — 62272 THER SPI PNXR DRG CSF: CPT

## 2024-06-05 RX ORDER — INSULIN GLARGINE 100 [IU]/ML
25 INJECTION, SOLUTION SUBCUTANEOUS AT BEDTIME
Refills: 0 | Status: DISCONTINUED | OUTPATIENT
Start: 2024-06-05 | End: 2024-06-05

## 2024-06-05 RX ORDER — POLYETHYLENE GLYCOL 3350 17 G/17G
17 POWDER, FOR SOLUTION ORAL DAILY
Refills: 0 | Status: DISCONTINUED | OUTPATIENT
Start: 2024-06-05 | End: 2024-06-10

## 2024-06-05 RX ORDER — SODIUM CHLORIDE 9 MG/ML
1000 INJECTION INTRAMUSCULAR; INTRAVENOUS; SUBCUTANEOUS ONCE
Refills: 0 | Status: COMPLETED | OUTPATIENT
Start: 2024-06-05 | End: 2024-06-05

## 2024-06-05 RX ORDER — ACETAMINOPHEN 500 MG
1000 TABLET ORAL ONCE
Refills: 0 | Status: COMPLETED | OUTPATIENT
Start: 2024-06-05 | End: 2024-06-05

## 2024-06-05 RX ORDER — INSULIN GLARGINE 100 [IU]/ML
32 INJECTION, SOLUTION SUBCUTANEOUS AT BEDTIME
Refills: 0 | Status: DISCONTINUED | OUTPATIENT
Start: 2024-06-05 | End: 2024-06-10

## 2024-06-05 RX ORDER — SENNA PLUS 8.6 MG/1
2 TABLET ORAL AT BEDTIME
Refills: 0 | Status: DISCONTINUED | OUTPATIENT
Start: 2024-06-05 | End: 2024-06-10

## 2024-06-05 RX ORDER — INSULIN LISPRO 100/ML
7 VIAL (ML) SUBCUTANEOUS
Refills: 0 | Status: DISCONTINUED | OUTPATIENT
Start: 2024-06-05 | End: 2024-06-07

## 2024-06-05 RX ADMIN — Medication 5 UNIT(S): at 08:18

## 2024-06-05 RX ADMIN — GABAPENTIN 300 MILLIGRAM(S): 400 CAPSULE ORAL at 12:09

## 2024-06-05 RX ADMIN — MIRTAZAPINE 7.5 MILLIGRAM(S): 45 TABLET, ORALLY DISINTEGRATING ORAL at 21:47

## 2024-06-05 RX ADMIN — Medication 1 APPLICATION(S): at 12:12

## 2024-06-05 RX ADMIN — Medication 650 MILLIGRAM(S): at 07:10

## 2024-06-05 RX ADMIN — Medication 1 APPLICATION(S): at 00:38

## 2024-06-05 RX ADMIN — SODIUM CHLORIDE 1000 MILLILITER(S): 9 INJECTION INTRAMUSCULAR; INTRAVENOUS; SUBCUTANEOUS at 12:09

## 2024-06-05 RX ADMIN — Medication 2 MILLIGRAM(S): at 13:47

## 2024-06-05 RX ADMIN — ATORVASTATIN CALCIUM 80 MILLIGRAM(S): 80 TABLET, FILM COATED ORAL at 21:49

## 2024-06-05 RX ADMIN — Medication 2: at 17:33

## 2024-06-05 RX ADMIN — INSULIN GLARGINE 32 UNIT(S): 100 INJECTION, SOLUTION SUBCUTANEOUS at 21:55

## 2024-06-05 RX ADMIN — Medication 5 UNIT(S): at 12:07

## 2024-06-05 RX ADMIN — CARVEDILOL PHOSPHATE 12.5 MILLIGRAM(S): 80 CAPSULE, EXTENDED RELEASE ORAL at 17:35

## 2024-06-05 RX ADMIN — LATANOPROST 1 DROP(S): 0.05 SOLUTION/ DROPS OPHTHALMIC; TOPICAL at 21:51

## 2024-06-05 RX ADMIN — LISINOPRIL 20 MILLIGRAM(S): 2.5 TABLET ORAL at 06:24

## 2024-06-05 RX ADMIN — Medication 1000 MILLIGRAM(S): at 15:30

## 2024-06-05 RX ADMIN — AMLODIPINE BESYLATE 5 MILLIGRAM(S): 2.5 TABLET ORAL at 17:35

## 2024-06-05 RX ADMIN — Medication 1 APPLICATION(S): at 06:23

## 2024-06-05 RX ADMIN — Medication 650 MILLIGRAM(S): at 06:37

## 2024-06-05 RX ADMIN — Medication 400 MILLIGRAM(S): at 15:06

## 2024-06-05 RX ADMIN — SENNA PLUS 2 TABLET(S): 8.6 TABLET ORAL at 21:47

## 2024-06-05 RX ADMIN — CARVEDILOL PHOSPHATE 12.5 MILLIGRAM(S): 80 CAPSULE, EXTENDED RELEASE ORAL at 06:24

## 2024-06-05 RX ADMIN — PANTOPRAZOLE SODIUM 40 MILLIGRAM(S): 20 TABLET, DELAYED RELEASE ORAL at 06:24

## 2024-06-05 RX ADMIN — Medication 5 UNIT(S): at 17:32

## 2024-06-05 RX ADMIN — Medication 2: at 08:18

## 2024-06-05 RX ADMIN — Medication 0: at 21:55

## 2024-06-05 RX ADMIN — QUETIAPINE FUMARATE 50 MILLIGRAM(S): 200 TABLET, FILM COATED ORAL at 19:48

## 2024-06-05 RX ADMIN — Medication 2: at 12:07

## 2024-06-05 NOTE — PROGRESS NOTE ADULT - PROBLEM SELECTOR PLAN 6
uncontrolled  A1C 11.7%  c/w consistent carb diet  inc lantus to 32 at bedtime and lispro 7 premeals with ISS ( endo reccs)  Continue glucose monitoring ac/HS and coverage with Admelog   Endocrinology Dr. Gotti

## 2024-06-05 NOTE — PROGRESS NOTE ADULT - PROBLEM SELECTOR PLAN 3
h/o dementia, non compliant with meds  plan as above  Maintain safety measures  Supportive measures.   Psych following

## 2024-06-05 NOTE — DIETITIAN INITIAL EVALUATION ADULT - OTHER INFO
Pt visited. Pt is Visually Impaired. Pt is alert an verbal.  Pt lives with Daughter at Home. Pt Reports H/O DM x ~ 5 years. Pt Reports he  used to eat sweets but now he ortiz snot. A1c 11.7.Endo on case. Notes Noted. Pt refuses insulin as out Pt Most of the time. D/W PCA Pt ate everything for Lunch. Per Pt he eats everything including Pork. Ht 67 inches.  Pt admitted with Acute Encephalopathy.

## 2024-06-05 NOTE — DIETITIAN INITIAL EVALUATION ADULT - PERTINENT MEDS FT
MEDICATIONS  (STANDING):  amLODIPine   Tablet 5 milliGRAM(s) Oral every 24 hours  atorvastatin 80 milliGRAM(s) Oral at bedtime  carvedilol 12.5 milliGRAM(s) Oral every 12 hours  gabapentin 300 milliGRAM(s) Oral daily  insulin glargine Injectable (LANTUS) 25 Unit(s) SubCutaneous at bedtime  insulin lispro (ADMELOG) corrective regimen sliding scale   SubCutaneous three times a day before meals  insulin lispro (ADMELOG) corrective regimen sliding scale   SubCutaneous at bedtime  insulin lispro Injectable (ADMELOG) 5 Unit(s) SubCutaneous three times a day before meals  latanoprost 0.005% Ophthalmic Solution 1 Drop(s) Both EYES at bedtime  lisinopril 20 milliGRAM(s) Oral daily  mirtazapine 7.5 milliGRAM(s) Oral at bedtime  pantoprazole    Tablet 40 milliGRAM(s) Oral before breakfast  polyethylene glycol 3350 17 Gram(s) Oral daily  QUEtiapine 50 milliGRAM(s) Oral at bedtime  senna 2 Tablet(s) Oral at bedtime    MEDICATIONS  (PRN):  acetaminophen     Tablet .. 650 milliGRAM(s) Oral every 6 hours PRN Temp greater or equal to 38C (100.4F), Mild Pain (1 - 3)  aluminum hydroxide/magnesium hydroxide/simethicone Suspension 30 milliLiter(s) Oral every 4 hours PRN Dyspepsia  haloperidol    Injectable 1 milliGRAM(s) IntraMuscular every 8 hours PRN Agitation  melatonin 3 milliGRAM(s) Oral at bedtime PRN Insomnia  ondansetron Injectable 4 milliGRAM(s) IV Push every 8 hours PRN Nausea and/or Vomiting  QUEtiapine 50 milliGRAM(s) Oral every 6 hours PRN mild to moderate agitation/insomnia

## 2024-06-05 NOTE — PROCEDURE NOTE - ADDITIONAL PROCEDURE DETAILS
Lumbar puncture obtained to rule out normal pressure hydrocephalus per Neuro and IM recommendations.  Consent obtained from patient's daughter Ange and placed in chart.   RN Megan at bedside assisting with patient positioning. DVT Ppx held. Patient unable to follow commands given mental status so was given 2mg of IV ativan prior to procedure. Patient's daughter aware and approved.   Under sterile technique approx. 30cc of clear spinal fluid obtained. Specimens walked down to lab.   Nursing staff made aware to maintain patient supine for at least 1 hour.

## 2024-06-05 NOTE — DIETITIAN INITIAL EVALUATION ADULT - PERTINENT LABORATORY DATA
06-05    140  |  110<H>  |  31<H>  ----------------------------<  219<H>  4.1   |  21<L>  |  1.46<H>    Ca    8.6      05 Jun 2024 05:42    POCT Blood Glucose.: 235 mg/dL (06-05-24 @ 11:35)  A1C with Estimated Average Glucose Result: 11.7 % (05-31-24 @ 06:10)

## 2024-06-05 NOTE — DIETITIAN INITIAL EVALUATION ADULT - PROBLEM SELECTOR PLAN 5
h/o HTN  /81 on admission  on lisinopril and amlodipine at home  will continue home meds  monitor BP

## 2024-06-05 NOTE — DIETITIAN INITIAL EVALUATION ADULT - PROBLEM SELECTOR PLAN 4
h/o DM   on lantus 40 qhs and   blood glucose 363 on admission bmp, lispro 14 before breakfast and lunch, lispro 10 at dinner    consistent carb diet  will start lantus 20 at bedtime and lispro 5 premeals with ISS  monitor FS

## 2024-06-05 NOTE — PROGRESS NOTE ADULT - PROBLEM SELECTOR PLAN 5
SCr 1.56, unknown baseline  s/p 1L IVF in ED   S/p IVF bolus (6/5)  consider d/c lisinopril if VITA not resolved  bladder scan- 285cc, f/u repeat bladder scan  Monitor BMP in AM

## 2024-06-05 NOTE — PROCEDURE NOTE - AMOUNT OF FLUID OBTAINED
Patient: Berta Nava    Procedure(s):  ENDOSCOPIC RETROGRADE CHOLANGIOPANCREATOGRAPHY with biliary dilation, stone removal, stent exchange    Diagnosis: Cholangitis [K83.09]  Diagnosis Additional Information: No value filed.    Anesthesia Type:   General     Note:    Oropharynx: oropharynx clear of all foreign objects and spontaneously breathing  Level of Consciousness: awake  Oxygen Supplementation: nasal cannula  Level of Supplemental Oxygen (L/min / FiO2): 3  Independent Airway: airway patency satisfactory and stable  Dentition: dentition unchanged  Vital Signs Stable: post-procedure vital signs reviewed and stable  Report to RN Given: handoff report given  Patient transferred to: PACU    Handoff Report: Identifed the Patient, Identified the Reponsible Provider, Reviewed the pertinent medical history, Discussed the surgical course, Reviewed Intra-OP anesthesia mangement and issues during anesthesia, Set expectations for post-procedure period and Allowed opportunity for questions and acknowledgement of understanding      Vitals: (Last set prior to Anesthesia Care Transfer)  CRNA VITALS  5/10/2021 1250 - 5/10/2021 1324      5/10/2021             Resp Rate (observed):  (!) 6        Electronically Signed By: CHELSEY Vargas CRNA  May 10, 2021  1:24 PM   30

## 2024-06-05 NOTE — DIETITIAN INITIAL EVALUATION ADULT - PROBLEM SELECTOR PLAN 1
presenting with AMS, more aggressive and confused at home over the past week   daughter reports urinary and bowel incontinence as well as worsening imbalance, but no falls  CTH: Motion limited exam. Ventricular prominence with findings suggesting normal pressure hydrocephalus. No mass effect or hemorrhage identified. Left occipital scalp soft tissue thickening.  UA (-)  UTox (-)   symptoms 2/2 worsening dementia vs NPH  f/u MRI brain with CSF flow noncon  Neurology Dr. Bonilla Consulted  Psych consulted  PT consulted

## 2024-06-05 NOTE — PROGRESS NOTE ADULT - SUBJECTIVE AND OBJECTIVE BOX
Patient is a 74y old  Male who presents with a chief complaint of AMS (05 Jun 2024 09:30)    PATIENT IS SEEN AND EXAMINED IN MEDICAL FLOOR.  NGT [    ]    SERGIO [   ]      GT [   ]    ALLERGIES:  No Known Allergies      Daily     Daily     VITALS:    Vital Signs Last 24 Hrs  T(C): 36.8 (05 Jun 2024 05:10), Max: 36.9 (04 Jun 2024 14:32)  T(F): 98.2 (05 Jun 2024 05:10), Max: 98.4 (04 Jun 2024 14:32)  HR: 79 (05 Jun 2024 05:10) (60 - 79)  BP: 139/69 (05 Jun 2024 05:10) (114/61 - 139/69)  BP(mean): --  RR: 18 (05 Jun 2024 05:10) (16 - 18)  SpO2: 97% (05 Jun 2024 05:10) (95% - 98%)    Parameters below as of 05 Jun 2024 05:10  Patient On (Oxygen Delivery Method): room air        LABS:    CBC Full  -  ( 05 Jun 2024 05:42 )  WBC Count : 10.90 K/uL  RBC Count : 4.48 M/uL  Hemoglobin : 12.9 g/dL  Hematocrit : 39.7 %  Platelet Count - Automated : 204 K/uL  Mean Cell Volume : 88.6 fl  Mean Cell Hemoglobin : 28.8 pg  Mean Cell Hemoglobin Concentration : 32.5 gm/dL  Auto Neutrophil # : x  Auto Lymphocyte # : x  Auto Monocyte # : x  Auto Eosinophil # : x  Auto Basophil # : x  Auto Neutrophil % : x  Auto Lymphocyte % : x  Auto Monocyte % : x  Auto Eosinophil % : x  Auto Basophil % : x    PT/INR - ( 05 Jun 2024 05:42 )   PT: 11.9 sec;   INR: 1.05 ratio           06-05    140  |  110<H>  |  31<H>  ----------------------------<  219<H>  4.1   |  21<L>  |  1.46<H>    Ca    8.6      05 Jun 2024 05:42      CAPILLARY BLOOD GLUCOSE      POCT Blood Glucose.: 227 mg/dL (05 Jun 2024 07:58)  POCT Blood Glucose.: 219 mg/dL (04 Jun 2024 21:11)  POCT Blood Glucose.: 188 mg/dL (04 Jun 2024 17:26)  POCT Blood Glucose.: 230 mg/dL (04 Jun 2024 12:51)  POCT Blood Glucose.: 250 mg/dL (04 Jun 2024 11:24)          Creatinine Trend: 1.46<--, 1.13<--, 1.51<--, 1.18<--, 1.56<--  I&O's Summary              MEDICATIONS:    MEDICATIONS  (STANDING):  amLODIPine   Tablet 5 milliGRAM(s) Oral every 24 hours  atorvastatin 80 milliGRAM(s) Oral at bedtime  carvedilol 12.5 milliGRAM(s) Oral every 12 hours  erythromycin   Ointment 1 Application(s) Both EYES four times a day  gabapentin 300 milliGRAM(s) Oral daily  insulin glargine Injectable (LANTUS) 25 Unit(s) SubCutaneous at bedtime  insulin lispro (ADMELOG) corrective regimen sliding scale   SubCutaneous at bedtime  insulin lispro (ADMELOG) corrective regimen sliding scale   SubCutaneous three times a day before meals  insulin lispro Injectable (ADMELOG) 5 Unit(s) SubCutaneous three times a day before meals  latanoprost 0.005% Ophthalmic Solution 1 Drop(s) Both EYES at bedtime  lisinopril 20 milliGRAM(s) Oral daily  mirtazapine 7.5 milliGRAM(s) Oral at bedtime  pantoprazole    Tablet 40 milliGRAM(s) Oral before breakfast  QUEtiapine 50 milliGRAM(s) Oral at bedtime  sodium chloride 0.9% Bolus 1000 milliLiter(s) IV Bolus once      MEDICATIONS  (PRN):  acetaminophen     Tablet .. 650 milliGRAM(s) Oral every 6 hours PRN Temp greater or equal to 38C (100.4F), Mild Pain (1 - 3)  aluminum hydroxide/magnesium hydroxide/simethicone Suspension 30 milliLiter(s) Oral every 4 hours PRN Dyspepsia  haloperidol    Injectable 1 milliGRAM(s) IntraMuscular every 8 hours PRN Agitation  melatonin 3 milliGRAM(s) Oral at bedtime PRN Insomnia  ondansetron Injectable 4 milliGRAM(s) IV Push every 8 hours PRN Nausea and/or Vomiting  QUEtiapine 50 milliGRAM(s) Oral every 6 hours PRN mild to moderate agitation/insomnia      REVIEW OF SYSTEMS:                           ALL ROS DONE [ X   ]    CONSTITUTIONAL:  LETHARGIC [   ], FEVER [   ], UNRESPONSIVE [   ]  CVS:  CP  [   ], SOB, [   ], PALPITATIONS [   ], DIZZYNESS [   ]  RS: COUGH [   ], SPUTUM [   ]  GI: ABDOMINAL PAIN [   ], NAUSEA [   ], VOMITINGS [   ], DIARRHEA [   ], CONSTIPATION [   ]  :  DYSURIA [   ], NOCTURIA [   ], INCREASED FREQUENCY [   ], DRIBLING [   ],  SKELETAL: PAINFUL JOINTS [   ], SWOLLEN JOINTS [   ], NECK ACHE [   ], LOW BACK ACHE [   ],  SKIN : ULCERS [   ], RASH [   ], ITCHING [   ]  CNS: HEAD ACHE [   ], DOUBLE VISION [   ], BLURRED VISION [   ], AMS / CONFUSION [   ], SEIZURES [   ], WEAKNESS [   ],TINGLING / NUMBNESS [   ]        PHYSICAL EXAMINATION:    GENERAL APPEARANCE: NO DISTRESS  HEENT:  NO PALLOR, NO  JVD,  NO   NODES, NECK SUPPLE  CVS: S1 +, S2 +,   RS: AEEB,  OCCASIONAL  RALES +,   NO RONCHI  ABD: SOFT, NT, NO, BS +  EXT: NO PE  SKIN: WARM,   SKELETAL:  ROM ACCEPTABLE  CNS:  AAO X 1-2    RADIOLOGY :    RADIOLOGY AND READINGS REVIEWED    ASSESSMENT :     Altered mental status        PLAN:  HPI:  74M PMH dementia, DM, and HTN presenting to the ED with AMS. Pt seen at bedside AAOX2, states he does not know why he was brought to the hospital, but was complaining of bilateral knee pain. Daughter called for collateral information, reports that for the past week, her father has become more confused and aggressive at home, pulling out electrical wires at home and trying to flush his clothes down the toilet. He has a HHA during the day for 12 hours x 5 days and his granddaughter stays the rest of the night with him, however, he has been becoming more aggressive and cursing out the granddaughter. He has been having urinary and bowel incontinence. He is blind in both eyes and ambulates with a walker however has been losing his balance lately with no falls. He denies any fevers, chills, CP, SOB, N/V/D, abdominal pain, dysuria, numbness/tingling in extremities.      (30 May 2024 10:14)        # [6/3] CASE DW PATIENT'S DAUGHTER CARLOS [CORRECTION FOR VELMA] ROSARIO @ [phone] . PER DAUGHTER PATIENT HAS HAD WORSENING DEMENTIA AND HAS NOTED MOOD CHANGES. SHE ALSO CONVEYS HE IS NONCOMPLIANT WITH MEDICATION DESPITE SUPPORTIVE MEASURES. SHE CONVEYS THAT FAMILY IS AGREEABLE FOR MUSHTAQ.   - SHE AND FAMILY ARE DISCUSSING WHETHER TO PURSUE LP AT THIS TIME OR NOT. NEUROLOGY TEAM DISCUSSED LP AND FURTHER WORKUP WITH FAMILY - THEY ARE DISCUSSING AS A FAMILY REGARDING WHETHER TO PURSUE FURTHER WORKUP AT THIS TIME. THEY UNDERSTAND RISKS AND BENEFITS OF FURTHER EVALUATION. THEY ALSO UNDERSTAND RISKS OF DEFERRING EVALUATION. THEY WILL DISCUSS AS A FAMILY AND DISCUSS WITH NEUROLOGY TO LET THE TEAM KNOW THEIR WISHES.   [6/4] CASE D/W DAUGHTER AT BEDSIDE - WISHES TO PURSUE LP PER DISCUSSION WITH MEDICAL TEAM AND NEUROLOGY TEAM        # CM TEAM TO COORDINATE MUSHTAQ PLACEMENT      # RESOLVED AMS     # LIKELY WORSENING VASCULAR DEMENTIA/CHRONIC WHITE MATTER DISEASE / CEREBRAL ISCHEMIA  # ? VENTRICULOMEGALY vs. NPH  - NOTED CT HEAD  - NOTED UA NEGATIVE  - NOTED UTOX NEGATIVE  - MRI BRAIN W/ CSF FLOW REVIEWED  - PSYCHIATRY CONSULT  - NEUROLOGY CONSULT    - ON REMERON  - ON SEROQUEL [UPTITRATING] PER PSYCH    - NEUROLOGY TEAM DISCUSSED LP AND FURTHER WORKUP WITH FAMILY - THEY ARE DISCUSSING AS A FAMILY REGARDING WHETHER TO PURSUE FURTHER WORKUP AT THIS TIME. THEY UNDERSTAND RISKS AND BENEFITS OF FURTHER EVALUATION. THEY ALSO UNDERSTAND RISKS OF DEFERRING EVALUATION.    - PLANNED FOR LP 6/5    # B/L KNEE PAIN -  LIKELY S/T OA - IMPROVED  # IMPAIRED GAIT DUE TO GENERALIZED MUSCLE WEAKNESS, POLYARTHRITIS, DIABETIC PERIPHERAL NEUROPATHY  - PRN PAIN CONTROL      # DIABETIC NEPHROPATHY  # VITA VS. CKD - IMPROVED  - MONITOR CR  - AVOID NEPHROTOXIC AGENTS    # DM  - HBA1C - 11.7   - SSI + FS  - ENDOCRINOLOGY CONSULT    # HTN    # VISUALLY IMPAIRED, LIKELY DIABETIC RETINOPATHY     # GI AND DVT PPX   Patient is a 74y old  Male who presents with a chief complaint of AMS (05 Jun 2024 09:30)    PATIENT IS SEEN AND EXAMINED IN MEDICAL FLOOR.      ALLERGIES:  No Known Allergies      VITALS:    Vital Signs Last 24 Hrs  T(C): 36.8 (05 Jun 2024 05:10), Max: 36.9 (04 Jun 2024 14:32)  T(F): 98.2 (05 Jun 2024 05:10), Max: 98.4 (04 Jun 2024 14:32)  HR: 79 (05 Jun 2024 05:10) (60 - 79)  BP: 139/69 (05 Jun 2024 05:10) (114/61 - 139/69)  BP(mean): --  RR: 18 (05 Jun 2024 05:10) (16 - 18)  SpO2: 97% (05 Jun 2024 05:10) (95% - 98%)    Parameters below as of 05 Jun 2024 05:10  Patient On (Oxygen Delivery Method): room air        LABS:    CBC Full  -  ( 05 Jun 2024 05:42 )  WBC Count : 10.90 K/uL  RBC Count : 4.48 M/uL  Hemoglobin : 12.9 g/dL  Hematocrit : 39.7 %  Platelet Count - Automated : 204 K/uL  Mean Cell Volume : 88.6 fl  Mean Cell Hemoglobin : 28.8 pg  Mean Cell Hemoglobin Concentration : 32.5 gm/dL  Auto Neutrophil # : x  Auto Lymphocyte # : x  Auto Monocyte # : x  Auto Eosinophil # : x  Auto Basophil # : x  Auto Neutrophil % : x  Auto Lymphocyte % : x  Auto Monocyte % : x  Auto Eosinophil % : x  Auto Basophil % : x    PT/INR - ( 05 Jun 2024 05:42 )   PT: 11.9 sec;   INR: 1.05 ratio           06-05    140  |  110<H>  |  31<H>  ----------------------------<  219<H>  4.1   |  21<L>  |  1.46<H>    Ca    8.6      05 Jun 2024 05:42      CAPILLARY BLOOD GLUCOSE      POCT Blood Glucose.: 227 mg/dL (05 Jun 2024 07:58)  POCT Blood Glucose.: 219 mg/dL (04 Jun 2024 21:11)  POCT Blood Glucose.: 188 mg/dL (04 Jun 2024 17:26)  POCT Blood Glucose.: 230 mg/dL (04 Jun 2024 12:51)  POCT Blood Glucose.: 250 mg/dL (04 Jun 2024 11:24)          Creatinine Trend: 1.46<--, 1.13<--, 1.51<--, 1.18<--, 1.56<--  I&O's Summary              MEDICATIONS:    MEDICATIONS  (STANDING):  amLODIPine   Tablet 5 milliGRAM(s) Oral every 24 hours  atorvastatin 80 milliGRAM(s) Oral at bedtime  carvedilol 12.5 milliGRAM(s) Oral every 12 hours  erythromycin   Ointment 1 Application(s) Both EYES four times a day  gabapentin 300 milliGRAM(s) Oral daily  insulin glargine Injectable (LANTUS) 25 Unit(s) SubCutaneous at bedtime  insulin lispro (ADMELOG) corrective regimen sliding scale   SubCutaneous at bedtime  insulin lispro (ADMELOG) corrective regimen sliding scale   SubCutaneous three times a day before meals  insulin lispro Injectable (ADMELOG) 5 Unit(s) SubCutaneous three times a day before meals  latanoprost 0.005% Ophthalmic Solution 1 Drop(s) Both EYES at bedtime  lisinopril 20 milliGRAM(s) Oral daily  mirtazapine 7.5 milliGRAM(s) Oral at bedtime  pantoprazole    Tablet 40 milliGRAM(s) Oral before breakfast  QUEtiapine 50 milliGRAM(s) Oral at bedtime  sodium chloride 0.9% Bolus 1000 milliLiter(s) IV Bolus once      MEDICATIONS  (PRN):  acetaminophen     Tablet .. 650 milliGRAM(s) Oral every 6 hours PRN Temp greater or equal to 38C (100.4F), Mild Pain (1 - 3)  aluminum hydroxide/magnesium hydroxide/simethicone Suspension 30 milliLiter(s) Oral every 4 hours PRN Dyspepsia  haloperidol    Injectable 1 milliGRAM(s) IntraMuscular every 8 hours PRN Agitation  melatonin 3 milliGRAM(s) Oral at bedtime PRN Insomnia  ondansetron Injectable 4 milliGRAM(s) IV Push every 8 hours PRN Nausea and/or Vomiting  QUEtiapine 50 milliGRAM(s) Oral every 6 hours PRN mild to moderate agitation/insomnia      REVIEW OF SYSTEMS:                           ALL ROS DONE [ X   ]    CONSTITUTIONAL:  LETHARGIC [   ], FEVER [   ], UNRESPONSIVE [   ]  CVS:  CP  [   ], SOB, [   ], PALPITATIONS [   ], DIZZYNESS [   ]  RS: COUGH [   ], SPUTUM [   ]  GI: ABDOMINAL PAIN [   ], NAUSEA [   ], VOMITINGS [   ], DIARRHEA [   ], CONSTIPATION [   ]  :  DYSURIA [   ], NOCTURIA [   ], INCREASED FREQUENCY [   ], DRIBLING [   ],  SKELETAL: PAINFUL JOINTS [   ], SWOLLEN JOINTS [   ], NECK ACHE [   ], LOW BACK ACHE [   ],  SKIN : ULCERS [   ], RASH [   ], ITCHING [   ]  CNS: HEAD ACHE [   ], DOUBLE VISION [   ], BLURRED VISION [   ], AMS / CONFUSION [   ], SEIZURES [   ], WEAKNESS [   ],TINGLING / NUMBNESS [   ]        PHYSICAL EXAMINATION:    GENERAL APPEARANCE: NO DISTRESS  HEENT:  NO PALLOR, NO  JVD,  NO   NODES, NECK SUPPLE  CVS: S1 +, S2 +,   RS: AEEB,  OCCASIONAL  RALES +,   NO RONCHI  ABD: SOFT, NT, NO, BS +  EXT: NO PE  SKIN: WARM,   SKELETAL:  ROM ACCEPTABLE  CNS:  AAO X 1-2    RADIOLOGY :    RADIOLOGY AND READINGS REVIEWED    ASSESSMENT :     Altered mental status        PLAN:  HPI:  74M PMH dementia, DM, and HTN presenting to the ED with AMS. Pt seen at bedside AAOX2, states he does not know why he was brought to the hospital, but was complaining of bilateral knee pain. Daughter called for collateral information, reports that for the past week, her father has become more confused and aggressive at home, pulling out electrical wires at home and trying to flush his clothes down the toilet. He has a HHA during the day for 12 hours x 5 days and his granddaughter stays the rest of the night with him, however, he has been becoming more aggressive and cursing out the granddaughter. He has been having urinary and bowel incontinence. He is blind in both eyes and ambulates with a walker however has been losing his balance lately with no falls. He denies any fevers, chills, CP, SOB, N/V/D, abdominal pain, dysuria, numbness/tingling in extremities.      (30 May 2024 10:14)        # [6/3] CASE DW PATIENT'S DAUGHTER CARLOS [CORRECTION FOR VELMA] ROSARIO @ [phone] . PER DAUGHTER PATIENT HAS HAD WORSENING DEMENTIA AND HAS NOTED MOOD CHANGES. SHE ALSO CONVEYS HE IS NONCOMPLIANT WITH MEDICATION DESPITE SUPPORTIVE MEASURES. SHE CONVEYS THAT FAMILY IS AGREEABLE FOR MUSHTAQ.   - SHE AND FAMILY ARE DISCUSSING WHETHER TO PURSUE LP AT THIS TIME OR NOT. NEUROLOGY TEAM DISCUSSED LP AND FURTHER WORKUP WITH FAMILY - THEY ARE DISCUSSING AS A FAMILY REGARDING WHETHER TO PURSUE FURTHER WORKUP AT THIS TIME. THEY UNDERSTAND RISKS AND BENEFITS OF FURTHER EVALUATION. THEY ALSO UNDERSTAND RISKS OF DEFERRING EVALUATION. THEY WILL DISCUSS AS A FAMILY AND DISCUSS WITH NEUROLOGY TO LET THE TEAM KNOW THEIR WISHES.   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 VENTRICULOMEGALY vs. NPH  - NOTED CT HEAD  - NOTED UA NEGATIVE  - NOTED UTOX NEGATIVE  - MRI BRAIN W/ CSF FLOW REVIEWED  - PSYCHIATRY CONSULT  - NEUROLOGY CONSULT    - ON REMERON  - ON SEROQUEL [UPTITRATING] PER PSYCH    - NEUROLOGY TEAM DISCUSSED LP AND FURTHER WORKUP WITH FAMILY - THEY ARE DISCUSSING AS A FAMILY REGARDING WHETHER TO PURSUE FURTHER WORKUP AT THIS TIME. THEY UNDERSTAND RISKS AND BENEFITS OF FURTHER EVALUATION. THEY ALSO UNDERSTAND RISKS OF DEFERRING EVALUATION.    - S/P LUMBAR PUNCTURE [6/5] - OPENING PRESSURE 17MM , F/U ANALYSIS    # B/L KNEE PAIN -  LIKELY S/T OA - IMPROVED  # IMPAIRED GAIT DUE TO GENERALIZED MUSCLE WEAKNESS, POLYARTHRITIS, DIABETIC PERIPHERAL NEUROPATHY  - PRN PAIN CONTROL      # DIABETIC NEPHROPATHY  # VITA VS. CKD  - MONITOR CR  - AVOID NEPHROTOXIC AGENTS    - F/U BLADDER SCAN    # DM  - HBA1C - 11.7   - SSI + FS  - ENDOCRINOLOGY CONSULT    # HTN    # VISUALLY IMPAIRED, LIKELY DIABETIC RETINOPATHY     # GI AND DVT PPX

## 2024-06-05 NOTE — PROGRESS NOTE ADULT - SUBJECTIVE AND OBJECTIVE BOX
Patient is a 74y old  Male who presents with a chief complaint of Altered mental status     (05 Jun 2024 13:22)      INTERVAL HPI/OVERNIGHT EVENTS: no overnight events    I&O's Summary    Vital Signs Last 24 Hrs  T(C): 36.2 (05 Jun 2024 14:22), Max: 36.8 (05 Jun 2024 05:10)  T(F): 97.2 (05 Jun 2024 14:22), Max: 98.2 (05 Jun 2024 05:10)  HR: 89 (05 Jun 2024 14:22) (60 - 89)  BP: 102/57 (05 Jun 2024 14:22) (102/57 - 139/69)  BP(mean): --  RR: 17 (05 Jun 2024 14:22) (16 - 18)  SpO2: 95% (05 Jun 2024 14:22) (95% - 98%)    Parameters below as of 05 Jun 2024 14:22  Patient On (Oxygen Delivery Method): room air      PAST MEDICAL & SURGICAL HISTORY:      SOCIAL HISTORY  Alcohol:  Tobacco:  Illicit substance use:      FAMILY HISTORY:      LABS:                        12.9   10.90 )-----------( 204      ( 05 Jun 2024 05:42 )             39.7     06-05    140  |  110<H>  |  31<H>  ----------------------------<  219<H>  4.1   |  21<L>  |  1.46<H>    Ca    8.6      05 Jun 2024 05:42      PT/INR - ( 05 Jun 2024 05:42 )   PT: 11.9 sec;   INR: 1.05 ratio           Urinalysis Basic - ( 05 Jun 2024 05:42 )    Color: x / Appearance: x / SG: x / pH: x  Gluc: 219 mg/dL / Ketone: x  / Bili: x / Urobili: x   Blood: x / Protein: x / Nitrite: x   Leuk Esterase: x / RBC: x / WBC x   Sq Epi: x / Non Sq Epi: x / Bacteria: x      CAPILLARY BLOOD GLUCOSE      POCT Blood Glucose.: 248 mg/dL (05 Jun 2024 16:53)  POCT Blood Glucose.: 235 mg/dL (05 Jun 2024 11:35)  POCT Blood Glucose.: 227 mg/dL (05 Jun 2024 07:58)  POCT Blood Glucose.: 219 mg/dL (04 Jun 2024 21:11)        Urinalysis Basic - ( 05 Jun 2024 05:42 )    Color: x / Appearance: x / SG: x / pH: x  Gluc: 219 mg/dL / Ketone: x  / Bili: x / Urobili: x   Blood: x / Protein: x / Nitrite: x   Leuk Esterase: x / RBC: x / WBC x   Sq Epi: x / Non Sq Epi: x / Bacteria: x        MEDICATIONS  (STANDING):  amLODIPine   Tablet 5 milliGRAM(s) Oral every 24 hours  atorvastatin 80 milliGRAM(s) Oral at bedtime  carvedilol 12.5 milliGRAM(s) Oral every 12 hours  gabapentin 300 milliGRAM(s) Oral daily  insulin glargine Injectable (LANTUS) 25 Unit(s) SubCutaneous at bedtime  insulin lispro (ADMELOG) corrective regimen sliding scale   SubCutaneous three times a day before meals  insulin lispro (ADMELOG) corrective regimen sliding scale   SubCutaneous at bedtime  insulin lispro Injectable (ADMELOG) 5 Unit(s) SubCutaneous three times a day before meals  latanoprost 0.005% Ophthalmic Solution 1 Drop(s) Both EYES at bedtime  lisinopril 20 milliGRAM(s) Oral daily  mirtazapine 7.5 milliGRAM(s) Oral at bedtime  pantoprazole    Tablet 40 milliGRAM(s) Oral before breakfast  polyethylene glycol 3350 17 Gram(s) Oral daily  QUEtiapine 50 milliGRAM(s) Oral at bedtime  senna 2 Tablet(s) Oral at bedtime    MEDICATIONS  (PRN):  acetaminophen     Tablet .. 650 milliGRAM(s) Oral every 6 hours PRN Temp greater or equal to 38C (100.4F), Mild Pain (1 - 3)  aluminum hydroxide/magnesium hydroxide/simethicone Suspension 30 milliLiter(s) Oral every 4 hours PRN Dyspepsia  haloperidol    Injectable 1 milliGRAM(s) IntraMuscular every 8 hours PRN Agitation  melatonin 3 milliGRAM(s) Oral at bedtime PRN Insomnia  ondansetron Injectable 4 milliGRAM(s) IV Push every 8 hours PRN Nausea and/or Vomiting  QUEtiapine 50 milliGRAM(s) Oral every 6 hours PRN mild to moderate agitation/insomnia      REVIEW OF SYSTEMS:  CONSTITUTIONAL: No fever  EYES: No eye pain, visual disturbances  ENMT: No sinus or throat pain  NECK: + pain   RESPIRATORY: No cough, wheezing, chills; No shortness of breath  CARDIOVASCULAR: No chest pain, palpitations  GASTROINTESTINAL: No abdominal pain. No nausea, vomiting. No diarrhea or constipation.   GENITOURINARY: No dysuria  NEUROLOGICAL: + headaches  SKIN: No rashes  MUSCULOSKELETAL: No joint pain     RADIOLOGY & ADDITIONAL TESTS:    ACC: [id number] EXAM:  CT BRAIN   ORDERED BY: KENAN QUEZADA     PROCEDURE DATE:  05/30/2024          INTERPRETATION:  INDICATIONS:  Alteration of  Consciousness  .    TECHNIQUE:  Serial axial images were obtained from the skull base to the   vertexwithout intravenous contrast. Coronal and sagittal reformatted   images were obtained.    COMPARISON EXAMINATION: None.    FINDINGS: The study is degraded by motion artifact    VENTRICLES AND SULCI:  There is ventricular prominence with rounding of   the frontal horns and prominence of the third ventricle. There is sulcal   effacement toward the vertex and a narrow callosal angle. Findings raise   the possibility of normal pressure hydrocephalus. There is a cavum septum   pellucidum and vergae.  INTRA-AXIAL:  No mass effect or hemorrhage identified given scan   limitations.  EXTRA-AXIAL:  No mass or collection is seen.  VISUALIZED SINUSES:  Mild mucosal thickening with left sphenoid foamy   secretions  VISUALIZED MASTOIDS:  Clear.  CALVARIUM: Normal.  MISCELLANEOUS:  Scalp thickening is seen in the left occipital region.   There is a questionable soft tissue defect within the left frontal scalp.  Right-sided glaucoma drainage device in place.    IMPRESSION:  Motion limited exam.    Ventricular prominence with findings suggesting normal pressure   hydrocephalus.    No mass effect or hemorrhage identified.    Left occipital scalp soft tissue thickening for which clinical   correlation is advised.    --- End of Report ---    ACC: [id number] EXAM:  MR BRAIN W CSF FLOW   ORDERED BY: RIYA GREGG     PROCEDURE DATE:  05/30/2024          INTERPRETATION:  .    CLINICAL INFORMATION: Possible normal pressure hydrocephalus on CT.    TECHNIQUE: Multiplanar multisequential MRI of the brain was acquired with   and without the administration of IV gadolinium. 7 cc's of IV Gadavist   was administered for the purposes of this examination. 3 cc were   discarded. The CSF cine flow portion of the examination was not performed   secondary to technical difficulties, as per the MRI technologist note.    COMPARISON: Prior CT study of the head from 5/30/2024.    FINDINGS: Ventricular enlargement is again seen which is out of   proportion to sulcal prominence. A prominent flow jet is seen on the   noncompensated sagittally acquired 3D T2 SPACE sequence within the   cerebral aqueduct.    There is an incidental cavum septum pellucidum et vergae.    Multiple rounded nonspecific T2/FLAIR hyperintense signal changes are   noted throughout the bihemispheric white matter without associated mass   effect or restricted diffusion.    No abnormal brain parenchymal or leptomeningeal enhancement is seen.    No abnormal extra-axial fluid collections are seen. Flow-voids are noted   throughout the major intracranial vessels, on the T2 weighted images,   consistent with their patency. The sellar location appears unremarkable.    Scattered mucosal thickening is seen throughout the paranasal sinuses.   The bilateral tympanomastoid cavities are clear. The calvarium appears   intact. There is evidence of bilateral cataract removal. A right-sided   glaucoma shunt catheter is seen around the globe.    Left-sided occipital and suboccipital soft tissue thickening is again   seen and appears unchanged.    IMPRESSION: Ventricular enlargement which is unremarkable to sulcal   prominence which can be seen with normal pressure or communicating   hydrocephalus. Clinical correlation is required.    No acute intracranial hemorrhage or evidence of acute ischemia.    Multiple nonspecific abnormal white matter foci of T2/FLAIR prolongation   statistically favoring microvascular type changes.    Unchanged left-sided occipital and suboccipital soft tissue thickening.      Imaging Personally Reviewed:  [x ] YES  [ ] NO    Consultant(s) Notes Reviewed:  [ x] YES  [ ] NO    PHYSICAL EXAM:  GENERAL: NAD  HEAD:  Atraumatic, Normocephalic  EYES: conjunctiva and sclera clear  ENMT:  Moist mucous membranes  NECK: Supple  NERVOUS SYSTEM:  Alert & Oriented X1-2  CHEST/LUNG: CTA bilaterally; No rales, rhonchi, wheezing, or rubs  HEART: Regular rate and rhythm  ABDOMEN: Soft, Nontender, Nondistended; Bowel sounds present  EXTREMITIES: No clubbing, cyanosis, or edema  SKIN: No rashes     Care Collaborated Discussed with Consultants/Other Providers [x ] YES  [ ] NO

## 2024-06-05 NOTE — PROGRESS NOTE ADULT - SUBJECTIVE AND OBJECTIVE BOX
Interval Events:  pt in nad    Allergies    No Known Allergies    Intolerances      Endocrine/Metabolic Medications:  atorvastatin 80 milliGRAM(s) Oral at bedtime  insulin glargine Injectable (LANTUS) 25 Unit(s) SubCutaneous at bedtime  insulin lispro (ADMELOG) corrective regimen sliding scale   SubCutaneous at bedtime  insulin lispro (ADMELOG) corrective regimen sliding scale   SubCutaneous three times a day before meals  insulin lispro Injectable (ADMELOG) 5 Unit(s) SubCutaneous three times a day before meals      Vital Signs Last 24 Hrs  T(C): 36.8 (05 Jun 2024 05:10), Max: 36.9 (04 Jun 2024 14:32)  T(F): 98.2 (05 Jun 2024 05:10), Max: 98.4 (04 Jun 2024 14:32)  HR: 79 (05 Jun 2024 05:10) (60 - 79)  BP: 139/69 (05 Jun 2024 05:10) (114/61 - 139/69)  BP(mean): --  RR: 18 (05 Jun 2024 05:10) (16 - 18)  SpO2: 97% (05 Jun 2024 05:10) (95% - 98%)    Parameters below as of 05 Jun 2024 05:10  Patient On (Oxygen Delivery Method): room air          PHYSICAL EXAM  All physical exam findings normal, except those marked:  General:	Alert, active, cooperative, NAD, well hydrated  .		[] Abnormal:  Neck		Normal: supple, no cervical adenopathy, no palpable thyroid  .		[] Abnormal:  Cardiovascular	Normal: regular rate, normal S1, S2, no murmurs  .		[] Abnormal:  Respiratory	Normal: no chest wall deformity, normal respiratory pattern, CTA B/L  .		[] Abnormal:  Abdominal	Normal: soft, ND, NT, bowel sounds present, no masses, no organomegaly  .		[] Abnormal:  		Normal normal genitalia, testes descended, circumcised/uncircumcised  .		Ahmet stage:			Breast ahmet:  .		Menstrual history:  .		[] Abnormal:  Extremities	Normal: FROM x4  .		[] Abnormal:  Skin		Normal: intact and not indurated, no rash, no acanthosis nigricans  .		[] Abnormal:  Neurologic	Normal: grossly intact  .		[] Abnormal:    LABS                        12.9   10.90 )-----------( 204      ( 05 Jun 2024 05:42 )             39.7                               140    |  110    |  31                  Calcium: 8.6   / iCa: x      (06-05 @ 05:42)    ----------------------------<  219       Magnesium: x                                4.1     |  21     |  1.46             Phosphorous: x          CAPILLARY BLOOD GLUCOSE      POCT Blood Glucose.: 227 mg/dL (05 Jun 2024 07:58)  POCT Blood Glucose.: 219 mg/dL (04 Jun 2024 21:11)  POCT Blood Glucose.: 188 mg/dL (04 Jun 2024 17:26)  POCT Blood Glucose.: 230 mg/dL (04 Jun 2024 12:51)  POCT Blood Glucose.: 250 mg/dL (04 Jun 2024 11:24)        Assesment/plan  74M PMH dementia, DM, and HTN presenting to the ED with AMS. Found to have uncont dm. Pt admits to taking meds and insulin as out pt- given by family. does not know fsg #           Problem/Recommendation - 1:  ·  Problem: DM (diabetes mellitus).   ·  Recommendation: poorly controlled as out pt  a1c- 11.7  rec inc lantus to 32 units  and admelog to 7 ac tid  fsg ac and hs  d/w family- daughter states pt refuses insulin as out pt most times      Problem/Recommendation - 2:  ·  Problem: Acute encephalopathy.   ·  Recommendation: tx per prim team.

## 2024-06-06 LAB
ANION GAP SERPL CALC-SCNC: 9 MMOL/L — SIGNIFICANT CHANGE UP (ref 5–17)
BUN SERPL-MCNC: 17 MG/DL — SIGNIFICANT CHANGE UP (ref 7–18)
C NEOFORM RRNA SPEC NAA+PROBE-ACNC: SIGNIFICANT CHANGE UP
CALCIUM SERPL-MCNC: 8.9 MG/DL — SIGNIFICANT CHANGE UP (ref 8.4–10.5)
CHLORIDE SERPL-SCNC: 111 MMOL/L — HIGH (ref 96–108)
CMV DNA CSF QL NAA+PROBE: SIGNIFICANT CHANGE UP
CO2 SERPL-SCNC: 20 MMOL/L — LOW (ref 22–31)
CREAT SERPL-MCNC: 1.13 MG/DL — SIGNIFICANT CHANGE UP (ref 0.5–1.3)
CSF PCR RESULT: SIGNIFICANT CHANGE UP
E COLI K1 DNA CSF QL NAA+NON-PROBE: SIGNIFICANT CHANGE UP
EGFR: 68 ML/MIN/1.73M2 — SIGNIFICANT CHANGE UP
ESCHERICHIA COLI K1: SIGNIFICANT CHANGE UP
EV RNA CSF QL NAA+PROBE: SIGNIFICANT CHANGE UP
GLUCOSE BLDC GLUCOMTR-MCNC: 130 MG/DL — HIGH (ref 70–99)
GLUCOSE BLDC GLUCOMTR-MCNC: 188 MG/DL — HIGH (ref 70–99)
GLUCOSE BLDC GLUCOMTR-MCNC: 243 MG/DL — HIGH (ref 70–99)
GLUCOSE BLDC GLUCOMTR-MCNC: 259 MG/DL — HIGH (ref 70–99)
GLUCOSE SERPL-MCNC: 259 MG/DL — HIGH (ref 70–99)
GP B STREP DNA SPEC QL NAA+PROBE: SIGNIFICANT CHANGE UP
HAEM INFLU DNA SPEC QL NAA+PROBE: SIGNIFICANT CHANGE UP
HCT VFR BLD CALC: 40.4 % — SIGNIFICANT CHANGE UP (ref 39–50)
HGB BLD-MCNC: 13.8 G/DL — SIGNIFICANT CHANGE UP (ref 13–17)
HHV6 DNA CSF QL NAA+PROBE: SIGNIFICANT CHANGE UP
HSV1 DNA CSF QL NAA+PROBE: SIGNIFICANT CHANGE UP
HSV2 DNA CSF QL NAA+PROBE: SIGNIFICANT CHANGE UP
L MONOCYTOG DNA SPEC QL NAA+PROBE: SIGNIFICANT CHANGE UP
LDH CSF L TO P-CCNC: 21 U/L — SIGNIFICANT CHANGE UP
LDH FLD-CCNC: 21 U/L — SIGNIFICANT CHANGE UP
MAGNESIUM SERPL-MCNC: 2 MG/DL — SIGNIFICANT CHANGE UP (ref 1.6–2.6)
MCHC RBC-ENTMCNC: 30.1 PG — SIGNIFICANT CHANGE UP (ref 27–34)
MCHC RBC-ENTMCNC: 34.2 GM/DL — SIGNIFICANT CHANGE UP (ref 32–36)
MCV RBC AUTO: 88 FL — SIGNIFICANT CHANGE UP (ref 80–100)
N MEN DNA SPEC QL NAA+PROBE: SIGNIFICANT CHANGE UP
NRBC # BLD: 0 /100 WBCS — SIGNIFICANT CHANGE UP (ref 0–0)
PARECHOVIRUS A RNA SPEC QL NAA+PROBE: SIGNIFICANT CHANGE UP
PHOSPHATE SERPL-MCNC: 2.4 MG/DL — LOW (ref 2.5–4.5)
PLATELET # BLD AUTO: 228 K/UL — SIGNIFICANT CHANGE UP (ref 150–400)
POTASSIUM SERPL-MCNC: 4 MMOL/L — SIGNIFICANT CHANGE UP (ref 3.5–5.3)
POTASSIUM SERPL-SCNC: 4 MMOL/L — SIGNIFICANT CHANGE UP (ref 3.5–5.3)
RBC # BLD: 4.59 M/UL — SIGNIFICANT CHANGE UP (ref 4.2–5.8)
RBC # FLD: 14.5 % — SIGNIFICANT CHANGE UP (ref 10.3–14.5)
S PNEUM DNA SPEC QL NAA+PROBE: SIGNIFICANT CHANGE UP
SODIUM SERPL-SCNC: 140 MMOL/L — SIGNIFICANT CHANGE UP (ref 135–145)
VZV DNA CSF QL NAA+PROBE: SIGNIFICANT CHANGE UP
WBC # BLD: 8.5 K/UL — SIGNIFICANT CHANGE UP (ref 3.8–10.5)
WBC # FLD AUTO: 8.5 K/UL — SIGNIFICANT CHANGE UP (ref 3.8–10.5)

## 2024-06-06 RX ORDER — POTASSIUM PHOSPHATE, MONOBASIC POTASSIUM PHOSPHATE, DIBASIC 236; 224 MG/ML; MG/ML
30 INJECTION, SOLUTION INTRAVENOUS ONCE
Refills: 0 | Status: COMPLETED | OUTPATIENT
Start: 2024-06-06 | End: 2024-06-06

## 2024-06-06 RX ADMIN — Medication 7 UNIT(S): at 08:29

## 2024-06-06 RX ADMIN — Medication 3: at 08:28

## 2024-06-06 RX ADMIN — POTASSIUM PHOSPHATE, MONOBASIC POTASSIUM PHOSPHATE, DIBASIC 83.33 MILLIMOLE(S): 236; 224 INJECTION, SOLUTION INTRAVENOUS at 12:01

## 2024-06-06 RX ADMIN — POLYETHYLENE GLYCOL 3350 17 GRAM(S): 17 POWDER, FOR SOLUTION ORAL at 12:03

## 2024-06-06 RX ADMIN — AMLODIPINE BESYLATE 5 MILLIGRAM(S): 2.5 TABLET ORAL at 17:19

## 2024-06-06 RX ADMIN — INSULIN GLARGINE 32 UNIT(S): 100 INJECTION, SOLUTION SUBCUTANEOUS at 21:22

## 2024-06-06 RX ADMIN — LATANOPROST 1 DROP(S): 0.05 SOLUTION/ DROPS OPHTHALMIC; TOPICAL at 21:22

## 2024-06-06 RX ADMIN — Medication 7 UNIT(S): at 12:24

## 2024-06-06 RX ADMIN — HALOPERIDOL DECANOATE 1 MILLIGRAM(S): 100 INJECTION INTRAMUSCULAR at 18:04

## 2024-06-06 RX ADMIN — CARVEDILOL PHOSPHATE 12.5 MILLIGRAM(S): 80 CAPSULE, EXTENDED RELEASE ORAL at 06:05

## 2024-06-06 RX ADMIN — MIRTAZAPINE 7.5 MILLIGRAM(S): 45 TABLET, ORALLY DISINTEGRATING ORAL at 21:21

## 2024-06-06 RX ADMIN — Medication 650 MILLIGRAM(S): at 00:53

## 2024-06-06 RX ADMIN — Medication 7 UNIT(S): at 17:17

## 2024-06-06 RX ADMIN — CARVEDILOL PHOSPHATE 12.5 MILLIGRAM(S): 80 CAPSULE, EXTENDED RELEASE ORAL at 17:18

## 2024-06-06 RX ADMIN — LISINOPRIL 20 MILLIGRAM(S): 2.5 TABLET ORAL at 06:06

## 2024-06-06 RX ADMIN — Medication 2: at 17:15

## 2024-06-06 RX ADMIN — SENNA PLUS 2 TABLET(S): 8.6 TABLET ORAL at 21:19

## 2024-06-06 RX ADMIN — QUETIAPINE FUMARATE 50 MILLIGRAM(S): 200 TABLET, FILM COATED ORAL at 21:22

## 2024-06-06 RX ADMIN — Medication 650 MILLIGRAM(S): at 01:50

## 2024-06-06 RX ADMIN — GABAPENTIN 300 MILLIGRAM(S): 400 CAPSULE ORAL at 12:03

## 2024-06-06 RX ADMIN — HALOPERIDOL DECANOATE 1 MILLIGRAM(S): 100 INJECTION INTRAMUSCULAR at 06:27

## 2024-06-06 RX ADMIN — PANTOPRAZOLE SODIUM 40 MILLIGRAM(S): 20 TABLET, DELAYED RELEASE ORAL at 06:07

## 2024-06-06 RX ADMIN — Medication 0: at 21:43

## 2024-06-06 RX ADMIN — ATORVASTATIN CALCIUM 80 MILLIGRAM(S): 80 TABLET, FILM COATED ORAL at 21:24

## 2024-06-06 NOTE — PROGRESS NOTE ADULT - PROBLEM SELECTOR PLAN 5
SCr 1.56, unknown baseline  s/p 1L IVF in ED   S/p IVF bolus (6/5)  consider d/c lisinopril if VITA not resolved  bladder scan- 285cc, f/u repeat bladder scan  Resolved

## 2024-06-06 NOTE — PROGRESS NOTE ADULT - SUBJECTIVE AND OBJECTIVE BOX
Patient is a 74y old  Male who presents with a chief complaint of AMS (06 Jun 2024 10:13)    PATIENT IS SEEN AND EXAMINED IN MEDICAL FLOOR.  NGT [    ]    SERGIO [   ]      GT [   ]    ALLERGIES:  No Known Allergies      Daily     Daily     VITALS:    Vital Signs Last 24 Hrs  T(C): 37 (06 Jun 2024 05:34), Max: 37 (06 Jun 2024 05:34)  T(F): 98.6 (06 Jun 2024 05:34), Max: 98.6 (06 Jun 2024 05:34)  HR: 78 (06 Jun 2024 05:34) (78 - 89)  BP: 151/66 (06 Jun 2024 05:34) (102/57 - 151/66)  BP(mean): --  RR: 20 (06 Jun 2024 05:34) (17 - 20)  SpO2: 95% (06 Jun 2024 05:34) (95% - 97%)    Parameters below as of 06 Jun 2024 05:34  Patient On (Oxygen Delivery Method): room air        LABS:    CBC Full  -  ( 06 Jun 2024 06:44 )  WBC Count : 8.50 K/uL  RBC Count : 4.59 M/uL  Hemoglobin : 13.8 g/dL  Hematocrit : 40.4 %  Platelet Count - Automated : 228 K/uL  Mean Cell Volume : 88.0 fl  Mean Cell Hemoglobin : 30.1 pg  Mean Cell Hemoglobin Concentration : 34.2 gm/dL  Auto Neutrophil # : x  Auto Lymphocyte # : x  Auto Monocyte # : x  Auto Eosinophil # : x  Auto Basophil # : x  Auto Neutrophil % : x  Auto Lymphocyte % : x  Auto Monocyte % : x  Auto Eosinophil % : x  Auto Basophil % : x    PT/INR - ( 05 Jun 2024 05:42 )   PT: 11.9 sec;   INR: 1.05 ratio           06-06    140  |  111<H>  |  17  ----------------------------<  259<H>  4.0   |  20<L>  |  1.13    Ca    8.9      06 Jun 2024 06:44  Phos  2.4     06-06  Mg     2.0     06-06      CAPILLARY BLOOD GLUCOSE      POCT Blood Glucose.: 259 mg/dL (06 Jun 2024 08:16)  POCT Blood Glucose.: 198 mg/dL (05 Jun 2024 21:42)  POCT Blood Glucose.: 248 mg/dL (05 Jun 2024 16:53)  POCT Blood Glucose.: 235 mg/dL (05 Jun 2024 11:35)          Creatinine Trend: 1.13<--, 1.46<--, 1.13<--, 1.51<--, 1.18<--, 1.56<--  I&O's Summary    06 Jun 2024 07:01  -  06 Jun 2024 10:28  --------------------------------------------------------  IN: 0 mL / OUT: 300 mL / NET: -300 mL            .CSF CSF  06-05 @ 14:42 --  --    No polymorphonuclear cells seen  No organisms seen  by cytocentrifuge          MEDICATIONS:    MEDICATIONS  (STANDING):  amLODIPine   Tablet 5 milliGRAM(s) Oral every 24 hours  atorvastatin 80 milliGRAM(s) Oral at bedtime  carvedilol 12.5 milliGRAM(s) Oral every 12 hours  gabapentin 300 milliGRAM(s) Oral daily  insulin glargine Injectable (LANTUS) 32 Unit(s) SubCutaneous at bedtime  insulin lispro (ADMELOG) corrective regimen sliding scale   SubCutaneous three times a day before meals  insulin lispro (ADMELOG) corrective regimen sliding scale   SubCutaneous at bedtime  insulin lispro Injectable (ADMELOG) 7 Unit(s) SubCutaneous three times a day before meals  latanoprost 0.005% Ophthalmic Solution 1 Drop(s) Both EYES at bedtime  lisinopril 20 milliGRAM(s) Oral daily  mirtazapine 7.5 milliGRAM(s) Oral at bedtime  pantoprazole    Tablet 40 milliGRAM(s) Oral before breakfast  polyethylene glycol 3350 17 Gram(s) Oral daily  potassium phosphate IVPB 30 milliMole(s) IV Intermittent once  QUEtiapine 50 milliGRAM(s) Oral at bedtime  senna 2 Tablet(s) Oral at bedtime      MEDICATIONS  (PRN):  acetaminophen     Tablet .. 650 milliGRAM(s) Oral every 6 hours PRN Temp greater or equal to 38C (100.4F), Mild Pain (1 - 3)  aluminum hydroxide/magnesium hydroxide/simethicone Suspension 30 milliLiter(s) Oral every 4 hours PRN Dyspepsia  haloperidol    Injectable 1 milliGRAM(s) IntraMuscular every 8 hours PRN Agitation  melatonin 3 milliGRAM(s) Oral at bedtime PRN Insomnia  ondansetron Injectable 4 milliGRAM(s) IV Push every 8 hours PRN Nausea and/or Vomiting  QUEtiapine 50 milliGRAM(s) Oral every 6 hours PRN mild to moderate agitation/insomnia      REVIEW OF SYSTEMS:                           ALL ROS DONE [ X   ]    CONSTITUTIONAL:  LETHARGIC [   ], FEVER [   ], UNRESPONSIVE [   ]  CVS:  CP  [   ], SOB, [   ], PALPITATIONS [   ], DIZZYNESS [   ]  RS: COUGH [   ], SPUTUM [   ]  GI: ABDOMINAL PAIN [   ], NAUSEA [   ], VOMITINGS [   ], DIARRHEA [   ], CONSTIPATION [   ]  :  DYSURIA [   ], NOCTURIA [   ], INCREASED FREQUENCY [   ], DRIBLING [   ],  SKELETAL: PAINFUL JOINTS [   ], SWOLLEN JOINTS [   ], NECK ACHE [   ], LOW BACK ACHE [   ],  SKIN : ULCERS [   ], RASH [   ], ITCHING [   ]  CNS: HEAD ACHE [   ], DOUBLE VISION [   ], BLURRED VISION [   ], AMS / CONFUSION [   ], SEIZURES [   ], WEAKNESS [   ],TINGLING / NUMBNESS [   ]        PHYSICAL EXAMINATION:    GENERAL APPEARANCE: NO DISTRESS  HEENT:  NO PALLOR, NO  JVD,  NO   NODES, NECK SUPPLE  CVS: S1 +, S2 +,   RS: AEEB,  OCCASIONAL  RALES +,   NO RONCHI  ABD: SOFT, NT, NO, BS +  EXT: NO PE  SKIN: WARM,   SKELETAL:  ROM ACCEPTABLE  CNS:  AAO X 1-2    RADIOLOGY :    RADIOLOGY AND READINGS REVIEWED    ASSESSMENT :     Altered mental status        PLAN:  HPI:  74M PMH dementia, DM, and HTN presenting to the ED with AMS. Pt seen at bedside AAOX2, states he does not know why he was brought to the hospital, but was complaining of bilateral knee pain. Daughter called for collateral information, reports that for the past week, her father has become more confused and aggressive at home, pulling out electrical wires at home and trying to flush his clothes down the toilet. He has a HHA during the day for 12 hours x 5 days and his granddaughter stays the rest of the night with him, however, he has been becoming more aggressive and cursing out the granddaughter. He has been having urinary and bowel incontinence. He is blind in both eyes and ambulates with a walker however has been losing his balance lately with no falls. He denies any fevers, chills, CP, SOB, N/V/D, abdominal pain, dysuria, numbness/tingling in extremities.      (30 May 2024 10:14)        # [6/3] CASE DW PATIENT'S DAUGHTER CARLOS [CORRECTION FOR VELMA] ROSARIO @ [phone] . PER DAUGHTER PATIENT HAS HAD WORSENING DEMENTIA AND HAS NOTED MOOD CHANGES. SHE ALSO CONVEYS HE IS NONCOMPLIANT WITH MEDICATION DESPITE SUPPORTIVE MEASURES. SHE CONVEYS THAT FAMILY IS AGREEABLE FOR MUSHTAQ.   - SHE AND FAMILY ARE DISCUSSING WHETHER TO PURSUE LP AT THIS TIME OR NOT. NEUROLOGY TEAM DISCUSSED LP AND FURTHER WORKUP WITH FAMILY - THEY ARE DISCUSSING AS A FAMILY REGARDING WHETHER TO PURSUE FURTHER WORKUP AT THIS TIME. THEY UNDERSTAND RISKS AND BENEFITS OF FURTHER EVALUATION. THEY ALSO UNDERSTAND RISKS OF DEFERRING EVALUATION. THEY WILL DISCUSS AS A FAMILY AND DISCUSS WITH NEUROLOGY TO LET THE TEAM KNOW THEIR WISHES.   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 VENTRICULOMEGALY vs. NPH  - NOTED CT HEAD  - NOTED UA NEGATIVE  - NOTED UTOX NEGATIVE  - MRI BRAIN W/ CSF FLOW REVIEWED  - PSYCHIATRY CONSULT  - NEUROLOGY CONSULT    - ON REMERON  - ON SEROQUEL [UPTITRATING] PER PSYCH    - NEUROLOGY TEAM DISCUSSED LP AND FURTHER WORKUP WITH FAMILY - THEY ARE DISCUSSING AS A FAMILY REGARDING WHETHER TO PURSUE FURTHER WORKUP AT THIS TIME. THEY UNDERSTAND RISKS AND BENEFITS OF FURTHER EVALUATION. THEY ALSO UNDERSTAND RISKS OF DEFERRING EVALUATION.    - S/P LUMBAR PUNCTURE [6/5] - OPENING PRESSURE 17MM , F/U ANALYSIS    # B/L KNEE PAIN -  LIKELY S/T OA - IMPROVED  # IMPAIRED GAIT DUE TO GENERALIZED MUSCLE WEAKNESS, POLYARTHRITIS, DIABETIC PERIPHERAL NEUROPATHY  - PRN PAIN CONTROL      # DIABETIC NEPHROPATHY  # VITA VS. CKD  - MONITOR CR  - AVOID NEPHROTOXIC AGENTS    - F/U BLADDER SCAN    # DM  - HBA1C - 11.7   - SSI + FS  - ENDOCRINOLOGY CONSULT    # HTN    # VISUALLY IMPAIRED, LIKELY DIABETIC RETINOPATHY     # GI AND DVT PPX       Patient is a 74y old  Male who presents with a chief complaint of AMS (06 Jun 2024 10:13)    PATIENT IS SEEN AND EXAMINED IN MEDICAL FLOOR.      ALLERGIES:  No Known Allergies      VITALS:    Vital Signs Last 24 Hrs  T(C): 37 (06 Jun 2024 05:34), Max: 37 (06 Jun 2024 05:34)  T(F): 98.6 (06 Jun 2024 05:34), Max: 98.6 (06 Jun 2024 05:34)  HR: 78 (06 Jun 2024 05:34) (78 - 89)  BP: 151/66 (06 Jun 2024 05:34) (102/57 - 151/66)  BP(mean): --  RR: 20 (06 Jun 2024 05:34) (17 - 20)  SpO2: 95% (06 Jun 2024 05:34) (95% - 97%)    Parameters below as of 06 Jun 2024 05:34  Patient On (Oxygen Delivery Method): room air        LABS:    CBC Full  -  ( 06 Jun 2024 06:44 )  WBC Count : 8.50 K/uL  RBC Count : 4.59 M/uL  Hemoglobin : 13.8 g/dL  Hematocrit : 40.4 %  Platelet Count - Automated : 228 K/uL  Mean Cell Volume : 88.0 fl  Mean Cell Hemoglobin : 30.1 pg  Mean Cell Hemoglobin Concentration : 34.2 gm/dL  Auto Neutrophil # : x  Auto Lymphocyte # : x  Auto Monocyte # : x  Auto Eosinophil # : x  Auto Basophil # : x  Auto Neutrophil % : x  Auto Lymphocyte % : x  Auto Monocyte % : x  Auto Eosinophil % : x  Auto Basophil % : x    PT/INR - ( 05 Jun 2024 05:42 )   PT: 11.9 sec;   INR: 1.05 ratio           06-06    140  |  111<H>  |  17  ----------------------------<  259<H>  4.0   |  20<L>  |  1.13    Ca    8.9      06 Jun 2024 06:44  Phos  2.4     06-06  Mg     2.0     06-06      CAPILLARY BLOOD GLUCOSE      POCT Blood Glucose.: 259 mg/dL (06 Jun 2024 08:16)  POCT Blood Glucose.: 198 mg/dL (05 Jun 2024 21:42)  POCT Blood Glucose.: 248 mg/dL (05 Jun 2024 16:53)  POCT Blood Glucose.: 235 mg/dL (05 Jun 2024 11:35)          Creatinine Trend: 1.13<--, 1.46<--, 1.13<--, 1.51<--, 1.18<--, 1.56<--  I&O's Summary    06 Jun 2024 07:01  -  06 Jun 2024 10:28  --------------------------------------------------------  IN: 0 mL / OUT: 300 mL / NET: -300 mL            .CSF CSF  06-05 @ 14:42 --  --    No polymorphonuclear cells seen  No organisms seen  by cytocentrifuge          MEDICATIONS:    MEDICATIONS  (STANDING):  amLODIPine   Tablet 5 milliGRAM(s) Oral every 24 hours  atorvastatin 80 milliGRAM(s) Oral at bedtime  carvedilol 12.5 milliGRAM(s) Oral every 12 hours  gabapentin 300 milliGRAM(s) Oral daily  insulin glargine Injectable (LANTUS) 32 Unit(s) SubCutaneous at bedtime  insulin lispro (ADMELOG) corrective regimen sliding scale   SubCutaneous three times a day before meals  insulin lispro (ADMELOG) corrective regimen sliding scale   SubCutaneous at bedtime  insulin lispro Injectable (ADMELOG) 7 Unit(s) SubCutaneous three times a day before meals  latanoprost 0.005% Ophthalmic Solution 1 Drop(s) Both EYES at bedtime  lisinopril 20 milliGRAM(s) Oral daily  mirtazapine 7.5 milliGRAM(s) Oral at bedtime  pantoprazole    Tablet 40 milliGRAM(s) Oral before breakfast  polyethylene glycol 3350 17 Gram(s) Oral daily  potassium phosphate IVPB 30 milliMole(s) IV Intermittent once  QUEtiapine 50 milliGRAM(s) Oral at bedtime  senna 2 Tablet(s) Oral at bedtime      MEDICATIONS  (PRN):  acetaminophen     Tablet .. 650 milliGRAM(s) Oral every 6 hours PRN Temp greater or equal to 38C (100.4F), Mild Pain (1 - 3)  aluminum hydroxide/magnesium hydroxide/simethicone Suspension 30 milliLiter(s) Oral every 4 hours PRN Dyspepsia  haloperidol    Injectable 1 milliGRAM(s) IntraMuscular every 8 hours PRN Agitation  melatonin 3 milliGRAM(s) Oral at bedtime PRN Insomnia  ondansetron Injectable 4 milliGRAM(s) IV Push every 8 hours PRN Nausea and/or Vomiting  QUEtiapine 50 milliGRAM(s) Oral every 6 hours PRN mild to moderate agitation/insomnia      REVIEW OF SYSTEMS:                           ALL ROS DONE [ X   ]    CONSTITUTIONAL:  LETHARGIC [   ], FEVER [   ], UNRESPONSIVE [   ]  CVS:  CP  [   ], SOB, [   ], PALPITATIONS [   ], DIZZYNESS [   ]  RS: COUGH [   ], SPUTUM [   ]  GI: ABDOMINAL PAIN [   ], NAUSEA [   ], VOMITINGS [   ], DIARRHEA [   ], CONSTIPATION [   ]  :  DYSURIA [   ], NOCTURIA [   ], INCREASED FREQUENCY [   ], DRIBLING [   ],  SKELETAL: PAINFUL JOINTS [   ], SWOLLEN JOINTS [   ], NECK ACHE [   ], LOW BACK ACHE [   ],  SKIN : ULCERS [   ], RASH [   ], ITCHING [   ]  CNS: HEAD ACHE [   ], DOUBLE VISION [   ], BLURRED VISION [   ], AMS / CONFUSION [   ], SEIZURES [   ], WEAKNESS [   ],TINGLING / NUMBNESS [   ]        PHYSICAL EXAMINATION:    GENERAL APPEARANCE: NO DISTRESS  HEENT:  NO PALLOR, NO  JVD,  NO   NODES, NECK SUPPLE  CVS: S1 +, S2 +,   RS: AEEB,  OCCASIONAL  RALES +,   NO RONCHI  ABD: SOFT, NT, NO, BS +  EXT: NO PE  SKIN: WARM,   SKELETAL:  ROM ACCEPTABLE  CNS:  AAO X 1-2    RADIOLOGY :    RADIOLOGY AND READINGS REVIEWED    ASSESSMENT :     Altered mental status        PLAN:  HPI:  74M PMH dementia, DM, and HTN presenting to the ED with AMS. Pt seen at bedside AAOX2, states he does not know why he was brought to the hospital, but was complaining of bilateral knee pain. Daughter called for collateral information, reports that for the past week, her father has become more confused and aggressive at home, pulling out electrical wires at home and trying to flush his clothes down the toilet. He has a HHA during the day for 12 hours x 5 days and his granddaughter stays the rest of the night with him, however, he has been becoming more aggressive and cursing out the granddaughter. He has been having urinary and bowel incontinence. He is blind in both eyes and ambulates with a walker however has been losing his balance lately with no falls. He denies any fevers, chills, CP, SOB, N/V/D, abdominal pain, dysuria, numbness/tingling in extremities.      (30 May 2024 10:14)        # [6/3] CASE DW PATIENT'S DAUGHTER CARLOS [CORRECTION FOR VELMA] ROSARIO @ [phone] . PER DAUGHTER PATIENT HAS HAD WORSENING DEMENTIA AND HAS NOTED MOOD CHANGES. SHE ALSO CONVEYS HE IS NONCOMPLIANT WITH MEDICATION DESPITE SUPPORTIVE MEASURES. SHE CONVEYS THAT FAMILY IS AGREEABLE FOR MUSHTAQ.   - SHE AND FAMILY ARE DISCUSSING WHETHER TO PURSUE LP AT THIS TIME OR NOT. NEUROLOGY TEAM DISCUSSED LP AND FURTHER WORKUP WITH FAMILY - THEY ARE DISCUSSING AS A FAMILY REGARDING WHETHER TO PURSUE FURTHER WORKUP AT THIS TIME. THEY UNDERSTAND RISKS AND BENEFITS OF FURTHER EVALUATION. THEY ALSO UNDERSTAND RISKS OF DEFERRING EVALUATION. THEY WILL DISCUSS AS A FAMILY AND DISCUSS WITH NEUROLOGY TO LET THE TEAM KNOW THEIR WISHES.   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 VENTRICULOMEGALY vs. NPH  - NOTED CT HEAD  - NOTED UA NEGATIVE  - NOTED UTOX NEGATIVE  - MRI BRAIN W/ CSF FLOW REVIEWED  - PSYCHIATRY CONSULT  - NEUROLOGY CONSULT    - ON REMERON  - ON SEROQUEL [UPTITRATING] PER PSYCH    - NEUROLOGY TEAM DISCUSSED LP AND FURTHER WORKUP WITH FAMILY    - S/P LUMBAR PUNCTURE [6/5] - OPENING PRESSURE 17MM , F/U ANALYSIS    # B/L KNEE PAIN -  LIKELY S/T OA - IMPROVED  # IMPAIRED GAIT DUE TO GENERALIZED MUSCLE WEAKNESS, POLYARTHRITIS, DIABETIC PERIPHERAL NEUROPATHY  - PRN PAIN CONTROL      # DIABETIC NEPHROPATHY  # VITA ON CKD - IMPROVED  - MONITOR CR  - AVOID NEPHROTOXIC AGENTS      # DM  - HBA1C - 11.7   - SSI + FS  - ENDOCRINOLOGY CONSULT    # HTN    # VISUALLY IMPAIRED, LIKELY DIABETIC RETINOPATHY     # GI AND DVT PPX

## 2024-06-06 NOTE — PROGRESS NOTE ADULT - SUBJECTIVE AND OBJECTIVE BOX
NP Note discussed with  primary attending    Patient is a 74y old  Male who presents with a chief complaint of AMS (06 Jun 2024 10:20)      INTERVAL HPI/OVERNIGHT EVENTS: no new complaints    MEDICATIONS  (STANDING):  amLODIPine   Tablet 5 milliGRAM(s) Oral every 24 hours  atorvastatin 80 milliGRAM(s) Oral at bedtime  carvedilol 12.5 milliGRAM(s) Oral every 12 hours  gabapentin 300 milliGRAM(s) Oral daily  insulin glargine Injectable (LANTUS) 32 Unit(s) SubCutaneous at bedtime  insulin lispro (ADMELOG) corrective regimen sliding scale   SubCutaneous at bedtime  insulin lispro (ADMELOG) corrective regimen sliding scale   SubCutaneous three times a day before meals  insulin lispro Injectable (ADMELOG) 7 Unit(s) SubCutaneous three times a day before meals  latanoprost 0.005% Ophthalmic Solution 1 Drop(s) Both EYES at bedtime  lisinopril 20 milliGRAM(s) Oral daily  mirtazapine 7.5 milliGRAM(s) Oral at bedtime  pantoprazole    Tablet 40 milliGRAM(s) Oral before breakfast  polyethylene glycol 3350 17 Gram(s) Oral daily  QUEtiapine 50 milliGRAM(s) Oral at bedtime  senna 2 Tablet(s) Oral at bedtime    MEDICATIONS  (PRN):  acetaminophen     Tablet .. 650 milliGRAM(s) Oral every 6 hours PRN Temp greater or equal to 38C (100.4F), Mild Pain (1 - 3)  aluminum hydroxide/magnesium hydroxide/simethicone Suspension 30 milliLiter(s) Oral every 4 hours PRN Dyspepsia  haloperidol    Injectable 1 milliGRAM(s) IntraMuscular every 8 hours PRN Agitation  melatonin 3 milliGRAM(s) Oral at bedtime PRN Insomnia  ondansetron Injectable 4 milliGRAM(s) IV Push every 8 hours PRN Nausea and/or Vomiting  QUEtiapine 50 milliGRAM(s) Oral every 6 hours PRN mild to moderate agitation/insomnia      __________________________________________________  REVIEW OF SYSTEMS: Unable to obtain due to impaired cognition    CONSTITUTIONAL: No fever,     Vital Signs Last 24 Hrs  T(C): 36.4 (06 Jun 2024 13:36), Max: 37 (06 Jun 2024 05:34)  T(F): 97.5 (06 Jun 2024 13:36), Max: 98.6 (06 Jun 2024 05:34)  HR: 59 (06 Jun 2024 13:36) (59 - 89)  BP: 138/76 (06 Jun 2024 13:36) (102/57 - 151/66)  BP(mean): --  RR: 17 (06 Jun 2024 13:36) (17 - 20)  SpO2: 96% (06 Jun 2024 13:36) (95% - 97%)    Parameters below as of 06 Jun 2024 13:36  Patient On (Oxygen Delivery Method): room air        ________________________________________________  PHYSICAL EXAM:  GENERAL: NAD  HEENT: Normocephalic;  conjunctivae and sclerae clear; moist mucous membranes;   NECK : supple  CHEST/LUNG: Clear to ausculitation bilaterally with good air entry   HEART: S1 S2  regular; no murmurs, gallops or rubs  ABDOMEN: Soft, Nontender, Nondistended; Bowel sounds present  EXTREMITIES: no cyanosis; no edema; no calf tenderness  SKIN: warm and dry; no rash  NERVOUS SYSTEM:  A&Ox1    _________________________________________________  LABS:                        13.8   8.50  )-----------( 228      ( 06 Jun 2024 06:44 )             40.4     06-06    140  |  111<H>  |  17  ----------------------------<  259<H>  4.0   |  20<L>  |  1.13    Ca    8.9      06 Jun 2024 06:44  Phos  2.4     06-06  Mg     2.0     06-06      PT/INR - ( 05 Jun 2024 05:42 )   PT: 11.9 sec;   INR: 1.05 ratio           Urinalysis Basic - ( 06 Jun 2024 06:44 )    Color: x / Appearance: x / SG: x / pH: x  Gluc: 259 mg/dL / Ketone: x  / Bili: x / Urobili: x   Blood: x / Protein: x / Nitrite: x   Leuk Esterase: x / RBC: x / WBC x   Sq Epi: x / Non Sq Epi: x / Bacteria: x      CAPILLARY BLOOD GLUCOSE      POCT Blood Glucose.: 130 mg/dL (06 Jun 2024 11:52)  POCT Blood Glucose.: 259 mg/dL (06 Jun 2024 08:16)  POCT Blood Glucose.: 198 mg/dL (05 Jun 2024 21:42)  POCT Blood Glucose.: 248 mg/dL (05 Jun 2024 16:53)        RADIOLOGY & ADDITIONAL TESTS:  < from: MR Head w/ CSF Flow, No Cont (05.30.24 @ 20:03) >  IMPRESSION: Ventricular enlargement which is unremarkable to sulcal   prominence which can be seen with normal pressure or communicating   hydrocephalus. Clinical correlation is required.    No acute intracranial hemorrhage or evidence of acute ischemia.    Multiple nonspecific abnormal white matter foci of T2/FLAIR prolongation   statistically favoring microvascular type changes.    Unchanged left-sided occipital and suboccipital soft tissue thickening.    --- End of Report ---        < end of copied text >    Imaging Personally Reviewed:  YES    Consultant(s) Notes Reviewed:   YES    Care Discussed with Consultants :     Plan of care was discussed with patient and /or primary care giver; all questions and concerns were addressed and care was aligned with patient's wishes.

## 2024-06-06 NOTE — PROGRESS NOTE ADULT - SUBJECTIVE AND OBJECTIVE BOX
Interval Events:      Allergies    No Known Allergies    Intolerances      Endocrine/Metabolic Medications:  atorvastatin 80 milliGRAM(s) Oral at bedtime  insulin glargine Injectable (LANTUS) 32 Unit(s) SubCutaneous at bedtime  insulin lispro (ADMELOG) corrective regimen sliding scale   SubCutaneous three times a day before meals  insulin lispro (ADMELOG) corrective regimen sliding scale   SubCutaneous at bedtime  insulin lispro Injectable (ADMELOG) 7 Unit(s) SubCutaneous three times a day before meals      Vital Signs Last 24 Hrs  T(C): 37 (06 Jun 2024 05:34), Max: 37 (06 Jun 2024 05:34)  T(F): 98.6 (06 Jun 2024 05:34), Max: 98.6 (06 Jun 2024 05:34)  HR: 78 (06 Jun 2024 05:34) (78 - 89)  BP: 151/66 (06 Jun 2024 05:34) (102/57 - 151/66)  BP(mean): --  RR: 20 (06 Jun 2024 05:34) (17 - 20)  SpO2: 95% (06 Jun 2024 05:34) (95% - 97%)    Parameters below as of 06 Jun 2024 05:34  Patient On (Oxygen Delivery Method): room air          PHYSICAL EXAM  All physical exam findings normal, except those marked:  General:	Alert, active, cooperative, NAD, well hydrated  .		[] Abnormal:  Neck		Normal: supple, no cervical adenopathy, no palpable thyroid  .		[] Abnormal:  Cardiovascular	Normal: regular rate, normal S1, S2, no murmurs  .		[] Abnormal:  Respiratory	Normal: no chest wall deformity, normal respiratory pattern, CTA B/L  .		[] Abnormal:  Abdominal	Normal: soft, ND, NT, bowel sounds present, no masses, no organomegaly  .		[] Abnormal:  		Normal normal genitalia, testes descended, circumcised/uncircumcised  .		Ahmet stage:			Breast ahmet:  .		Menstrual history:  .		[] Abnormal:  Extremities	Normal: FROM x4  .		[] Abnormal:  Skin		Normal: intact and not indurated, no rash, no acanthosis nigricans  .		[] Abnormal:  Neurologic	Normal: grossly intact  .		[] Abnormal:    LABS                        13.8   8.50  )-----------( 228      ( 06 Jun 2024 06:44 )             40.4                               140    |  111    |  17                  Calcium: 8.9   / iCa: x      (06-06 @ 06:44)    ----------------------------<  259       Magnesium: 2.0                              4.0     |  20     |  1.13             Phosphorous: 2.4        CAPILLARY BLOOD GLUCOSE      POCT Blood Glucose.: 259 mg/dL (06 Jun 2024 08:16)  POCT Blood Glucose.: 198 mg/dL (05 Jun 2024 21:42)  POCT Blood Glucose.: 248 mg/dL (05 Jun 2024 16:53)  POCT Blood Glucose.: 235 mg/dL (05 Jun 2024 11:35)        Assesment/plan       Interval Events:  pt in nad    Allergies    No Known Allergies    Intolerances      Endocrine/Metabolic Medications:  atorvastatin 80 milliGRAM(s) Oral at bedtime  insulin glargine Injectable (LANTUS) 32 Unit(s) SubCutaneous at bedtime  insulin lispro (ADMELOG) corrective regimen sliding scale   SubCutaneous three times a day before meals  insulin lispro (ADMELOG) corrective regimen sliding scale   SubCutaneous at bedtime  insulin lispro Injectable (ADMELOG) 7 Unit(s) SubCutaneous three times a day before meals      Vital Signs Last 24 Hrs  T(C): 37 (06 Jun 2024 05:34), Max: 37 (06 Jun 2024 05:34)  T(F): 98.6 (06 Jun 2024 05:34), Max: 98.6 (06 Jun 2024 05:34)  HR: 78 (06 Jun 2024 05:34) (78 - 89)  BP: 151/66 (06 Jun 2024 05:34) (102/57 - 151/66)  BP(mean): --  RR: 20 (06 Jun 2024 05:34) (17 - 20)  SpO2: 95% (06 Jun 2024 05:34) (95% - 97%)    Parameters below as of 06 Jun 2024 05:34  Patient On (Oxygen Delivery Method): room air          PHYSICAL EXAM  All physical exam findings normal, except those marked:  General:	Alert, active, cooperative, NAD, well hydrated  .		[] Abnormal:  Neck		Normal: supple, no cervical adenopathy, no palpable thyroid  .		[] Abnormal:  Cardiovascular	Normal: regular rate, normal S1, S2, no murmurs  .		[] Abnormal:  Respiratory	Normal: no chest wall deformity, normal respiratory pattern, CTA B/L  .		[] Abnormal:  Abdominal	Normal: soft, ND, NT, bowel sounds present, no masses, no organomegaly  .		[] Abnormal:  		Normal normal genitalia, testes descended, circumcised/uncircumcised  .		Ahmet stage:			Breast ahmet:  .		Menstrual history:  .		[] Abnormal:  Extremities	Normal: FROM x4  .		[] Abnormal:  Skin		Normal: intact and not indurated, no rash, no acanthosis nigricans  .		[] Abnormal:  Neurologic	Normal: grossly intact  .		[] Abnormal:    LABS                        13.8   8.50  )-----------( 228      ( 06 Jun 2024 06:44 )             40.4                               140    |  111    |  17                  Calcium: 8.9   / iCa: x      (06-06 @ 06:44)    ----------------------------<  259       Magnesium: 2.0                              4.0     |  20     |  1.13             Phosphorous: 2.4        CAPILLARY BLOOD GLUCOSE      POCT Blood Glucose.: 259 mg/dL (06 Jun 2024 08:16)  POCT Blood Glucose.: 198 mg/dL (05 Jun 2024 21:42)  POCT Blood Glucose.: 248 mg/dL (05 Jun 2024 16:53)  POCT Blood Glucose.: 235 mg/dL (05 Jun 2024 11:35)        Assesment/plan    74M PMH dementia, DM, and HTN presenting to the ED with AMS. Found to have uncont dm. Pt admits to taking meds and insulin as out pt- given by family. does not know fsg #           Problem/Recommendation - 1:  ·  Problem: DM (diabetes mellitus).   ·  Recommendation: poorly controlled as out pt  a1c- 11.7  inc lantus 36 units  and admelog  7 ac tid  fsg ac and hs  d/w family- daughter states pt refuses insulin as out pt most times      Problem/Recommendation - 2:  ·  Problem: Acute encephalopathy.   ·  Recommendation: tx per prim team.

## 2024-06-07 LAB
GLUCOSE BLDC GLUCOMTR-MCNC: 126 MG/DL — HIGH (ref 70–99)
GLUCOSE BLDC GLUCOMTR-MCNC: 143 MG/DL — HIGH (ref 70–99)
GLUCOSE BLDC GLUCOMTR-MCNC: 176 MG/DL — HIGH (ref 70–99)
GLUCOSE BLDC GLUCOMTR-MCNC: 74 MG/DL — SIGNIFICANT CHANGE UP (ref 70–99)
GLUCOSE BLDC GLUCOMTR-MCNC: 82 MG/DL — SIGNIFICANT CHANGE UP (ref 70–99)

## 2024-06-07 PROCEDURE — 99223 1ST HOSP IP/OBS HIGH 75: CPT

## 2024-06-07 RX ORDER — INSULIN LISPRO 100/ML
5 VIAL (ML) SUBCUTANEOUS
Refills: 0 | Status: DISCONTINUED | OUTPATIENT
Start: 2024-06-07 | End: 2024-06-10

## 2024-06-07 RX ORDER — HALOPERIDOL DECANOATE 100 MG/ML
5 INJECTION INTRAMUSCULAR ONCE
Refills: 0 | Status: DISCONTINUED | OUTPATIENT
Start: 2024-06-07 | End: 2024-06-07

## 2024-06-07 RX ORDER — ENOXAPARIN SODIUM 100 MG/ML
40 INJECTION SUBCUTANEOUS EVERY 24 HOURS
Refills: 0 | Status: DISCONTINUED | OUTPATIENT
Start: 2024-06-07 | End: 2024-06-10

## 2024-06-07 RX ADMIN — QUETIAPINE FUMARATE 50 MILLIGRAM(S): 200 TABLET, FILM COATED ORAL at 21:38

## 2024-06-07 RX ADMIN — INSULIN GLARGINE 32 UNIT(S): 100 INJECTION, SOLUTION SUBCUTANEOUS at 21:38

## 2024-06-07 RX ADMIN — POLYETHYLENE GLYCOL 3350 17 GRAM(S): 17 POWDER, FOR SOLUTION ORAL at 11:45

## 2024-06-07 RX ADMIN — SENNA PLUS 2 TABLET(S): 8.6 TABLET ORAL at 21:38

## 2024-06-07 RX ADMIN — PANTOPRAZOLE SODIUM 40 MILLIGRAM(S): 20 TABLET, DELAYED RELEASE ORAL at 06:02

## 2024-06-07 RX ADMIN — CARVEDILOL PHOSPHATE 12.5 MILLIGRAM(S): 80 CAPSULE, EXTENDED RELEASE ORAL at 17:24

## 2024-06-07 RX ADMIN — Medication 3 MILLIGRAM(S): at 21:37

## 2024-06-07 RX ADMIN — ENOXAPARIN SODIUM 40 MILLIGRAM(S): 100 INJECTION SUBCUTANEOUS at 17:24

## 2024-06-07 RX ADMIN — AMLODIPINE BESYLATE 5 MILLIGRAM(S): 2.5 TABLET ORAL at 17:24

## 2024-06-07 RX ADMIN — GABAPENTIN 300 MILLIGRAM(S): 400 CAPSULE ORAL at 11:45

## 2024-06-07 RX ADMIN — ATORVASTATIN CALCIUM 80 MILLIGRAM(S): 80 TABLET, FILM COATED ORAL at 21:37

## 2024-06-07 RX ADMIN — Medication 1: at 17:25

## 2024-06-07 RX ADMIN — LATANOPROST 1 DROP(S): 0.05 SOLUTION/ DROPS OPHTHALMIC; TOPICAL at 21:38

## 2024-06-07 RX ADMIN — Medication 7 UNIT(S): at 08:07

## 2024-06-07 RX ADMIN — MIRTAZAPINE 7.5 MILLIGRAM(S): 45 TABLET, ORALLY DISINTEGRATING ORAL at 21:38

## 2024-06-07 RX ADMIN — Medication 5 UNIT(S): at 17:25

## 2024-06-07 NOTE — CONSULT NOTE ADULT - TIME BILLING
I counseled the family at bedside and primary team about the testing and treatment options for evaluation management of the patient's cognitive decline.
Chart review.  Brain imaging review.  Interviewing daughter.  Co-ordination of care with medical team.

## 2024-06-07 NOTE — PROGRESS NOTE ADULT - SUBJECTIVE AND OBJECTIVE BOX
Interval Events:  pt in nad    Allergies    No Known Allergies    Intolerances      Endocrine/Metabolic Medications:  atorvastatin 80 milliGRAM(s) Oral at bedtime  insulin glargine Injectable (LANTUS) 32 Unit(s) SubCutaneous at bedtime  insulin lispro (ADMELOG) corrective regimen sliding scale   SubCutaneous three times a day before meals  insulin lispro (ADMELOG) corrective regimen sliding scale   SubCutaneous at bedtime  insulin lispro Injectable (ADMELOG) 7 Unit(s) SubCutaneous three times a day before meals      Vital Signs Last 24 Hrs  T(C): 36.5 (07 Jun 2024 13:49), Max: 36.7 (07 Jun 2024 05:09)  T(F): 97.7 (07 Jun 2024 13:49), Max: 98.1 (07 Jun 2024 05:09)  HR: 54 (07 Jun 2024 13:49) (54 - 86)  BP: 122/61 (07 Jun 2024 13:49) (108/82 - 158/77)  BP(mean): 78 (07 Jun 2024 05:09) (78 - 78)  RR: 18 (07 Jun 2024 13:49) (18 - 18)  SpO2: 97% (07 Jun 2024 13:49) (95% - 97%)    Parameters below as of 07 Jun 2024 13:49  Patient On (Oxygen Delivery Method): room air          PHYSICAL EXAM  All physical exam findings normal, except those marked:  General:	Alert, active, cooperative, NAD, well hydrated  .		[] Abnormal:  Neck		Normal: supple, no cervical adenopathy, no palpable thyroid  .		[] Abnormal:  Cardiovascular	Normal: regular rate, normal S1, S2, no murmurs  .		[] Abnormal:  Respiratory	Normal: no chest wall deformity, normal respiratory pattern, CTA B/L  .		[] Abnormal:  Abdominal	Normal: soft, ND, NT, bowel sounds present, no masses, no organomegaly  .		[] Abnormal:  		Normal normal genitalia, testes descended, circumcised/uncircumcised  .		Ahmet stage:			Breast ahmet:  .		Menstrual history:  .		[] Abnormal:  Extremities	Normal: FROM x4  .		[] Abnormal:  Skin		Normal: intact and not indurated, no rash, no acanthosis nigricans  .		[] Abnormal:  Neurologic	Normal: grossly intact  .		[] Abnormal:    LABS        CAPILLARY BLOOD GLUCOSE      POCT Blood Glucose.: 82 mg/dL (07 Jun 2024 11:38)  POCT Blood Glucose.: 74 mg/dL (07 Jun 2024 11:22)  POCT Blood Glucose.: 126 mg/dL (07 Jun 2024 07:46)  POCT Blood Glucose.: 188 mg/dL (06 Jun 2024 21:32)  POCT Blood Glucose.: 243 mg/dL (06 Jun 2024 17:02)        Assesment/plan      74M PMH dementia, DM, and HTN presenting to the ED with AMS. Found to have uncont dm. Pt admits to taking meds and insulin as out pt- given by family. does not know fsg #           Problem/Recommendation - 1:  ·  Problem: DM (diabetes mellitus).   ·  Recommendation: poorly controlled as out pt  a1c- 11.7  mild postmeal hypo   cont lantus 32 units  dec admelog to 5 ac tid- hold if poor po intake  fsg ac and hs  d/w family- daughter states pt refuses insulin as out pt most times      Problem/Recommendation - 2:  ·  Problem: Acute encephalopathy.   ·  Recommendation: tx per prim team.

## 2024-06-07 NOTE — PROGRESS NOTE ADULT - SUBJECTIVE AND OBJECTIVE BOX
Patient is a 74y old  Male who presents with a chief complaint of AMS (06 Jun 2024 13:50)    PATIENT IS SEEN AND EXAMINED IN MEDICAL FLOOR.  JETT [    ]    SERGIO [   ]      GT [   ]    ALLERGIES:  No Known Allergies      Daily     Daily     VITALS:    Vital Signs Last 24 Hrs  T(C): 36.7 (07 Jun 2024 05:09), Max: 36.7 (07 Jun 2024 05:09)  T(F): 98.1 (07 Jun 2024 05:09), Max: 98.1 (07 Jun 2024 05:09)  HR: 60 (07 Jun 2024 05:09) (59 - 86)  BP: 108/82 (07 Jun 2024 05:09) (108/82 - 158/77)  BP(mean): 78 (07 Jun 2024 05:09) (78 - 78)  RR: 18 (07 Jun 2024 05:09) (17 - 18)  SpO2: 95% (07 Jun 2024 05:09) (95% - 97%)    Parameters below as of 07 Jun 2024 05:09  Patient On (Oxygen Delivery Method): room air        LABS:    CBC Full  -  ( 06 Jun 2024 06:44 )  WBC Count : 8.50 K/uL  RBC Count : 4.59 M/uL  Hemoglobin : 13.8 g/dL  Hematocrit : 40.4 %  Platelet Count - Automated : 228 K/uL  Mean Cell Volume : 88.0 fl  Mean Cell Hemoglobin : 30.1 pg  Mean Cell Hemoglobin Concentration : 34.2 gm/dL  Auto Neutrophil # : x  Auto Lymphocyte # : x  Auto Monocyte # : x  Auto Eosinophil # : x  Auto Basophil # : x  Auto Neutrophil % : x  Auto Lymphocyte % : x  Auto Monocyte % : x  Auto Eosinophil % : x  Auto Basophil % : x      06-06    140  |  111<H>  |  17  ----------------------------<  259<H>  4.0   |  20<L>  |  1.13    Ca    8.9      06 Jun 2024 06:44  Phos  2.4     06-06  Mg     2.0     06-06      CAPILLARY BLOOD GLUCOSE      POCT Blood Glucose.: 126 mg/dL (07 Jun 2024 07:46)  POCT Blood Glucose.: 188 mg/dL (06 Jun 2024 21:32)  POCT Blood Glucose.: 243 mg/dL (06 Jun 2024 17:02)  POCT Blood Glucose.: 130 mg/dL (06 Jun 2024 11:52)          Creatinine Trend: 1.13<--, 1.46<--, 1.13<--, 1.51<--, 1.18<--, 1.56<--  I&O's Summary    06 Jun 2024 07:01  -  07 Jun 2024 07:00  --------------------------------------------------------  IN: 0 mL / OUT: 300 mL / NET: -300 mL            .CSF CSF  06-05 @ 14:42   No growth  --    No polymorphonuclear cells seen  No organisms seen  by cytocentrifuge          MEDICATIONS:    MEDICATIONS  (STANDING):  amLODIPine   Tablet 5 milliGRAM(s) Oral every 24 hours  atorvastatin 80 milliGRAM(s) Oral at bedtime  carvedilol 12.5 milliGRAM(s) Oral every 12 hours  gabapentin 300 milliGRAM(s) Oral daily  insulin glargine Injectable (LANTUS) 32 Unit(s) SubCutaneous at bedtime  insulin lispro (ADMELOG) corrective regimen sliding scale   SubCutaneous three times a day before meals  insulin lispro (ADMELOG) corrective regimen sliding scale   SubCutaneous at bedtime  insulin lispro Injectable (ADMELOG) 7 Unit(s) SubCutaneous three times a day before meals  latanoprost 0.005% Ophthalmic Solution 1 Drop(s) Both EYES at bedtime  lisinopril 20 milliGRAM(s) Oral daily  mirtazapine 7.5 milliGRAM(s) Oral at bedtime  pantoprazole    Tablet 40 milliGRAM(s) Oral before breakfast  polyethylene glycol 3350 17 Gram(s) Oral daily  QUEtiapine 50 milliGRAM(s) Oral at bedtime  senna 2 Tablet(s) Oral at bedtime      MEDICATIONS  (PRN):  acetaminophen     Tablet .. 650 milliGRAM(s) Oral every 6 hours PRN Temp greater or equal to 38C (100.4F), Mild Pain (1 - 3)  aluminum hydroxide/magnesium hydroxide/simethicone Suspension 30 milliLiter(s) Oral every 4 hours PRN Dyspepsia  haloperidol    Injectable 1 milliGRAM(s) IntraMuscular every 8 hours PRN Agitation  melatonin 3 milliGRAM(s) Oral at bedtime PRN Insomnia  ondansetron Injectable 4 milliGRAM(s) IV Push every 8 hours PRN Nausea and/or Vomiting  QUEtiapine 50 milliGRAM(s) Oral every 6 hours PRN mild to moderate agitation/insomnia      REVIEW OF SYSTEMS:                           ALL ROS DONE [ X   ]    CONSTITUTIONAL:  LETHARGIC [   ], FEVER [   ], UNRESPONSIVE [   ]  CVS:  CP  [   ], SOB, [   ], PALPITATIONS [   ], DIZZYNESS [   ]  RS: COUGH [   ], SPUTUM [   ]  GI: ABDOMINAL PAIN [   ], NAUSEA [   ], VOMITINGS [   ], DIARRHEA [   ], CONSTIPATION [   ]  :  DYSURIA [   ], NOCTURIA [   ], INCREASED FREQUENCY [   ], DRIBLING [   ],  SKELETAL: PAINFUL JOINTS [   ], SWOLLEN JOINTS [   ], NECK ACHE [   ], LOW BACK ACHE [   ],  SKIN : ULCERS [   ], RASH [   ], ITCHING [   ]  CNS: HEAD ACHE [   ], DOUBLE VISION [   ], BLURRED VISION [   ], AMS / CONFUSION [   ], SEIZURES [   ], WEAKNESS [   ],TINGLING / NUMBNESS [   ]        PHYSICAL EXAMINATION:    GENERAL APPEARANCE: NO DISTRESS  HEENT:  NO PALLOR, NO  JVD,  NO   NODES, NECK SUPPLE  CVS: S1 +, S2 +,   RS: AEEB,  OCCASIONAL  RALES +,   NO RONCHI  ABD: SOFT, NT, NO, BS +  EXT: NO PE  SKIN: WARM,   SKELETAL:  ROM ACCEPTABLE  CNS:  AAO X 1-2    RADIOLOGY :    RADIOLOGY AND READINGS REVIEWED    ASSESSMENT :     Altered mental status        PLAN:  HPI:  74M PMH dementia, DM, and HTN presenting to the ED with AMS. Pt seen at bedside AAOX2, states he does not know why he was brought to the hospital, but was complaining of bilateral knee pain. Daughter called for collateral information, reports that for the past week, her father has become more confused and aggressive at home, pulling out electrical wires at home and trying to flush his clothes down the toilet. He has a HHA during the day for 12 hours x 5 days and his granddaughter stays the rest of the night with him, however, he has been becoming more aggressive and cursing out the granddaughter. He has been having urinary and bowel incontinence. He is blind in both eyes and ambulates with a walker however has been losing his balance lately with no falls. He denies any fevers, chills, CP, SOB, N/V/D, abdominal pain, dysuria, numbness/tingling in extremities.      (30 May 2024 10:14)        # [6/3] CASE DW PATIENT'S DAUGHTER CARLOS [CORRECTION FOR VELMA] ROSARIO @ [phone] . PER DAUGHTER PATIENT HAS HAD WORSENING DEMENTIA AND HAS NOTED MOOD CHANGES. SHE ALSO CONVEYS HE IS NONCOMPLIANT WITH MEDICATION DESPITE SUPPORTIVE MEASURES. SHE CONVEYS THAT FAMILY IS AGREEABLE FOR MUSHTAQ.   - SHE AND FAMILY ARE DISCUSSING WHETHER TO PURSUE LP AT THIS TIME OR NOT. NEUROLOGY TEAM DISCUSSED LP AND FURTHER WORKUP WITH FAMILY - THEY ARE DISCUSSING AS A FAMILY REGARDING WHETHER TO PURSUE FURTHER WORKUP AT THIS TIME. THEY UNDERSTAND RISKS AND BENEFITS OF FURTHER EVALUATION. THEY ALSO UNDERSTAND RISKS OF DEFERRING EVALUATION. THEY WILL DISCUSS AS A FAMILY AND DISCUSS WITH NEUROLOGY TO LET THE TEAM KNOW THEIR WISHES.   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 VENTRICULOMEGALY vs. NPH  - NOTED CT HEAD  - NOTED UA NEGATIVE  - NOTED UTOX NEGATIVE  - MRI BRAIN W/ CSF FLOW REVIEWED  - PSYCHIATRY CONSULT  - NEUROLOGY CONSULT    - ON REMERON  - ON SEROQUEL [UPTITRATING] PER PSYCH    - NEUROLOGY TEAM DISCUSSED LP AND FURTHER WORKUP WITH FAMILY    - S/P LUMBAR PUNCTURE [6/5] - OPENING PRESSURE 17MM , F/U ANALYSIS    # B/L KNEE PAIN -  LIKELY S/T OA - IMPROVED  # IMPAIRED GAIT DUE TO GENERALIZED MUSCLE WEAKNESS, POLYARTHRITIS, DIABETIC PERIPHERAL NEUROPATHY  - PRN PAIN CONTROL      # DIABETIC NEPHROPATHY  # VITA ON CKD - IMPROVED  - MONITOR CR  - AVOID NEPHROTOXIC AGENTS      # DM  - HBA1C - 11.7   - SSI + FS  - ENDOCRINOLOGY CONSULT    # HTN    # VISUALLY IMPAIRED, LIKELY DIABETIC RETINOPATHY     # GI AND DVT PPX   Patient is a 74y old  Male who presents with a chief complaint of AMS (06 Jun 2024 13:50)    PATIENT IS SEEN AND EXAMINED IN MEDICAL FLOOR.      ALLERGIES:  No Known Allergies      VITALS:    Vital Signs Last 24 Hrs  T(C): 36.7 (07 Jun 2024 05:09), Max: 36.7 (07 Jun 2024 05:09)  T(F): 98.1 (07 Jun 2024 05:09), Max: 98.1 (07 Jun 2024 05:09)  HR: 60 (07 Jun 2024 05:09) (59 - 86)  BP: 108/82 (07 Jun 2024 05:09) (108/82 - 158/77)  BP(mean): 78 (07 Jun 2024 05:09) (78 - 78)  RR: 18 (07 Jun 2024 05:09) (17 - 18)  SpO2: 95% (07 Jun 2024 05:09) (95% - 97%)    Parameters below as of 07 Jun 2024 05:09  Patient On (Oxygen Delivery Method): room air        LABS:    CBC Full  -  ( 06 Jun 2024 06:44 )  WBC Count : 8.50 K/uL  RBC Count : 4.59 M/uL  Hemoglobin : 13.8 g/dL  Hematocrit : 40.4 %  Platelet Count - Automated : 228 K/uL  Mean Cell Volume : 88.0 fl  Mean Cell Hemoglobin : 30.1 pg  Mean Cell Hemoglobin Concentration : 34.2 gm/dL  Auto Neutrophil # : x  Auto Lymphocyte # : x  Auto Monocyte # : x  Auto Eosinophil # : x  Auto Basophil # : x  Auto Neutrophil % : x  Auto Lymphocyte % : x  Auto Monocyte % : x  Auto Eosinophil % : x  Auto Basophil % : x      06-06    140  |  111<H>  |  17  ----------------------------<  259<H>  4.0   |  20<L>  |  1.13    Ca    8.9      06 Jun 2024 06:44  Phos  2.4     06-06  Mg     2.0     06-06      CAPILLARY BLOOD GLUCOSE      POCT Blood Glucose.: 126 mg/dL (07 Jun 2024 07:46)  POCT Blood Glucose.: 188 mg/dL (06 Jun 2024 21:32)  POCT Blood Glucose.: 243 mg/dL (06 Jun 2024 17:02)  POCT Blood Glucose.: 130 mg/dL (06 Jun 2024 11:52)          Creatinine Trend: 1.13<--, 1.46<--, 1.13<--, 1.51<--, 1.18<--, 1.56<--  I&O's Summary    06 Jun 2024 07:01  -  07 Jun 2024 07:00  --------------------------------------------------------  IN: 0 mL / OUT: 300 mL / NET: -300 mL            .CSF CSF  06-05 @ 14:42   No growth  --    No polymorphonuclear cells seen  No organisms seen  by cytocentrifuge          MEDICATIONS:    MEDICATIONS  (STANDING):  amLODIPine   Tablet 5 milliGRAM(s) Oral every 24 hours  atorvastatin 80 milliGRAM(s) Oral at bedtime  carvedilol 12.5 milliGRAM(s) Oral every 12 hours  gabapentin 300 milliGRAM(s) Oral daily  insulin glargine Injectable (LANTUS) 32 Unit(s) SubCutaneous at bedtime  insulin lispro (ADMELOG) corrective regimen sliding scale   SubCutaneous three times a day before meals  insulin lispro (ADMELOG) corrective regimen sliding scale   SubCutaneous at bedtime  insulin lispro Injectable (ADMELOG) 7 Unit(s) SubCutaneous three times a day before meals  latanoprost 0.005% Ophthalmic Solution 1 Drop(s) Both EYES at bedtime  lisinopril 20 milliGRAM(s) Oral daily  mirtazapine 7.5 milliGRAM(s) Oral at bedtime  pantoprazole    Tablet 40 milliGRAM(s) Oral before breakfast  polyethylene glycol 3350 17 Gram(s) Oral daily  QUEtiapine 50 milliGRAM(s) Oral at bedtime  senna 2 Tablet(s) Oral at bedtime      MEDICATIONS  (PRN):  acetaminophen     Tablet .. 650 milliGRAM(s) Oral every 6 hours PRN Temp greater or equal to 38C (100.4F), Mild Pain (1 - 3)  aluminum hydroxide/magnesium hydroxide/simethicone Suspension 30 milliLiter(s) Oral every 4 hours PRN Dyspepsia  haloperidol    Injectable 1 milliGRAM(s) IntraMuscular every 8 hours PRN Agitation  melatonin 3 milliGRAM(s) Oral at bedtime PRN Insomnia  ondansetron Injectable 4 milliGRAM(s) IV Push every 8 hours PRN Nausea and/or Vomiting  QUEtiapine 50 milliGRAM(s) Oral every 6 hours PRN mild to moderate agitation/insomnia      REVIEW OF SYSTEMS:                           ALL ROS DONE [ X   ]    CONSTITUTIONAL:  LETHARGIC [   ], FEVER [   ], UNRESPONSIVE [   ]  CVS:  CP  [   ], SOB, [   ], PALPITATIONS [   ], DIZZYNESS [   ]  RS: COUGH [   ], SPUTUM [   ]  GI: ABDOMINAL PAIN [   ], NAUSEA [   ], VOMITINGS [   ], DIARRHEA [   ], CONSTIPATION [   ]  :  DYSURIA [   ], NOCTURIA [   ], INCREASED FREQUENCY [   ], DRIBLING [   ],  SKELETAL: PAINFUL JOINTS [   ], SWOLLEN JOINTS [   ], NECK ACHE [   ], LOW BACK ACHE [   ],  SKIN : ULCERS [   ], RASH [   ], ITCHING [   ]  CNS: HEAD ACHE [   ], DOUBLE VISION [   ], BLURRED VISION [   ], AMS / CONFUSION [   ], SEIZURES [   ], WEAKNESS [   ],TINGLING / NUMBNESS [   ]        PHYSICAL EXAMINATION:    GENERAL APPEARANCE: NO DISTRESS  HEENT:  NO PALLOR, NO  JVD,  NO   NODES, NECK SUPPLE  CVS: S1 +, S2 +,   RS: AEEB,  OCCASIONAL  RALES +,   NO RONCHI  ABD: SOFT, NT, NO, BS +  EXT: NO PE  SKIN: WARM,   SKELETAL:  ROM ACCEPTABLE  CNS:  AAO X 1-2        RADIOLOGY :    RADIOLOGY AND READINGS REVIEWED        ASSESSMENT :     Altered mental status        PLAN:  HPI:  74M PMH dementia, DM, and HTN presenting to the ED with AMS. Pt seen at bedside AAOX2, states he does not know why he was brought to the hospital, but was complaining of bilateral knee pain. Daughter called for collateral information, reports that for the past week, her father has become more confused and aggressive at home, pulling out electrical wires at home and trying to flush his clothes down the toilet. He has a HHA during the day for 12 hours x 5 days and his granddaughter stays the rest of the night with him, however, he has been becoming more aggressive and cursing out the granddaughter. He has been having urinary and bowel incontinence. He is blind in both eyes and ambulates with a walker however has been losing his balance lately with no falls. He denies any fevers, chills, CP, SOB, N/V/D, abdominal pain, dysuria, numbness/tingling in extremities.      (30 May 2024 10:14)    # [6/7] CASE D/W DAUGHTER CARLOS AT BEDSIDE. ALL QUESTIONS ANSWERED.       # [6/3] CASE DW PATIENT'S DAUGHTER CARLOS [CORRECTION FOR VELMA] ROSARIO @ [phone] . PER DAUGHTER PATIENT HAS HAD WORSENING DEMENTIA AND HAS NOTED MOOD CHANGES. SHE ALSO CONVEYS HE IS NONCOMPLIANT WITH MEDICATION DESPITE SUPPORTIVE MEASURES. SHE CONVEYS THAT FAMILY IS AGREEABLE FOR MUSHTAQ.   - SHE AND FAMILY ARE DISCUSSING WHETHER TO PURSUE LP AT THIS TIME OR NOT. NEUROLOGY TEAM DISCUSSED LP AND FURTHER WORKUP WITH FAMILY - THEY ARE DISCUSSING AS A FAMILY REGARDING WHETHER TO PURSUE FURTHER WORKUP AT THIS TIME. THEY UNDERSTAND RISKS AND BENEFITS OF FURTHER EVALUATION. THEY ALSO UNDERSTAND RISKS OF DEFERRING EVALUATION. THEY WILL DISCUSS AS A FAMILY AND DISCUSS WITH NEUROLOGY TO LET THE TEAM KNOW THEIR WISHES.   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 VENTRICULOMEGALY vs. NPH  - NOTED CT HEAD  - NOTED UA NEGATIVE  - NOTED UTOX NEGATIVE  - MRI BRAIN W/ CSF FLOW REVIEWED  - PSYCHIATRY CONSULT  - NEUROLOGY CONSULT    - ON REMERON  - ON SEROQUEL [UPTITRATING] PER PSYCH    - NEUROLOGY TEAM DISCUSSED LP AND FURTHER WORKUP WITH FAMILY    - S/P LUMBAR PUNCTURE [6/5] - OPENING PRESSURE 17MM , F/U ANALYSIS    # B/L KNEE PAIN -  LIKELY S/T OA - IMPROVED  # IMPAIRED GAIT DUE TO GENERALIZED MUSCLE WEAKNESS, POLYARTHRITIS, DIABETIC PERIPHERAL NEUROPATHY  - PRN PAIN CONTROL      # DIABETIC NEPHROPATHY  # VITA ON CKD - IMPROVED  - MONITOR CR  - AVOID NEPHROTOXIC AGENTS      # DM  - HBA1C - 11.7   - SSI + FS  - ENDOCRINOLOGY CONSULT    # HTN    # VISUALLY IMPAIRED, LIKELY DIABETIC RETINOPATHY     # GI AND DVT PPX

## 2024-06-07 NOTE — PROGRESS NOTE ADULT - PROBLEM SELECTOR PLAN 9
Pt from home. PT recc MUSHTAQ. , CM following    --->>f/u neuro recs on fluid studies result
Pt from home. PT recc MUSHTAQ. , CM following    --->>f/u fluid studies from [facility], bladder scan and renal functions
Pt from home. PT recc MUSHTAQ. , CM following    --->>f/u neuro recs on LP fluid studies result  -->>Pending MUSHTAQ placement

## 2024-06-07 NOTE — PROGRESS NOTE ADULT - PROBLEM SELECTOR PROBLEM 8
Prophylactic measure
Prophylactic measure
Discharge planning issues
Discharge planning issues
Prophylactic measure
Discharge planning issues

## 2024-06-07 NOTE — CONSULT NOTE ADULT - SUBJECTIVE AND OBJECTIVE BOX
BIBEMS from home 5/30/24.  History from Admission H&P    <Start of quote(s) from H&P>  "Reason for Admission: AMS  History of Present Illness:   74M PMH dementia, DM, and HTN presenting to the ED with AMS. Pt seen at bedside AAOX2, states he does not know why he was brought to the hospital, but was complaining of bilateral knee pain. Daughter called for collateral information, reports that for the past week, her father has become more confused and aggressive at home, pulling out electrical wires at home and trying to flush his clothes down the toilet. He has a HHA during the day for 12 hours x 5 days and his granddaughter stays the rest of the night with him, however, he has been becoming more aggressive and cursing out the granddaughter. He has been having urinary and bowel incontinence. He is blind in both eyes and ambulates with a walker however has been losing his balance lately with no falls. He denies any fevers, chills, CP, SOB, N/V/D, abdominal pain, dysuria, numbness/tingling in extremities.      Review of Systems:  Review of Systems: CONSTITUTIONAL: No fever, weight loss, or fatigue  RESPIRATORY: No cough, wheezing, chills or hemoptysis; No shortness of breath  CARDIOVASCULAR: No chest pain, palpitations, dizziness, or leg swelling  GASTROINTESTINAL: No abdominal pain. No nausea, vomiting, or hematemesis; No diarrhea or constipation. No melena or hematochezia.  GENITOURINARY: No dysuria or hematuria, urinary frequency  NEUROLOGICAL: No headaches, memory loss, loss of strength, numbness, or tremors  ENDOCRINE: No polyuria, polydipsia, or heat/cold intolerance  MUSKULOSKELETAL: No muscle aches, joint pains  HEME: no easy bruisability, no tender or enlarged lymph nodes  SKIN: No itching, burning, rashes, or lesions .   . . .     * Outpatient Medication Status not yet specified   . . .       Patient History:    Social History:  · Substance use	No"  <End of quote(s) from H&P>   BIBEMS from home 5/30/24.  History from Admission H&P    <Start of quote(s) from H&P>  "Reason for Admission: AMS  History of Present Illness:   74M PMH dementia, DM, and HTN presenting to the ED with AMS. Pt seen at bedside AAOX2, states he does not know why he was brought to the hospital, but was complaining of bilateral knee pain. Daughter called for collateral information, reports that for the past week, her father has become more confused and aggressive at home, pulling out electrical wires at home and trying to flush his clothes down the toilet. He has a HHA during the day for 12 hours x 5 days and his granddaughter stays the rest of the night with him, however, he has been becoming more aggressive and cursing out the granddaughter. He has been having urinary and bowel incontinence. He is blind in both eyes and ambulates with a walker however has been losing his balance lately with no falls. He denies any fevers, chills, CP, SOB, N/V/D, abdominal pain, dysuria, numbness/tingling in extremities.      Review of Systems:  Review of Systems: CONSTITUTIONAL: No fever, weight loss, or fatigue  RESPIRATORY: No cough, wheezing, chills or hemoptysis; No shortness of breath  CARDIOVASCULAR: No chest pain, palpitations, dizziness, or leg swelling  GASTROINTESTINAL: No abdominal pain. No nausea, vomiting, or hematemesis; No diarrhea or constipation. No melena or hematochezia.  GENITOURINARY: No dysuria or hematuria, urinary frequency  NEUROLOGICAL: No headaches, memory loss, loss of strength, numbness, or tremors  ENDOCRINE: No polyuria, polydipsia, or heat/cold intolerance  MUSKULOSKELETAL: No muscle aches, joint pains  HEME: no easy bruisability, no tender or enlarged lymph nodes  SKIN: No itching, burning, rashes, or lesions .   . . .     * Outpatient Medication Status not yet specified   . . .       Patient History:    Social History:  · Substance use	No"  <End of quote(s) from H&P>    Per radiology report of non-con head CT 5/30/24:  "COMPARISON EXAMINATION: None.    FINDINGS: The study is degraded by motion artifact    VENTRICLES AND SULCI:  There is ventricular prominence with rounding of   the frontal horns and prominence of the third ventricle. There is sulcal   effacement toward the vertex and a narrow callosal angle. Findings raise   the possibility of normal pressure hydrocephalus. There is a cavum septum   pellucidum and vergae.  INTRA-AXIAL:  No mass effect or hemorrhage identified given scan   limitations.  EXTRA-AXIAL:  No mass or collection is seen.  VISUALIZED SINUSES:  Mild mucosal thickening with left sphenoid foamy   secretions  VISUALIZED MASTOIDS:  Clear.  CALVARIUM: Normal.  MISCELLANEOUS:  Scalp thickening is seen in the left occipital region.   There is a questionable soft tissue defect within the left frontal scalp.  Right-sided glaucoma drainage device in place.    IMPRESSION:  Motion limited exam.    Ventricular prominence with findings suggesting normal pressure   hydrocephalus.    No mass effect or hemorrhage identified.    Left occipital scalp soft tissue thickening for which clinical   correlation is advised."      Per radiology report of non-con MR head with CSF flow 5/30/24:  "COMPARISON: Prior CT study of the head from 5/30/2024.    FINDINGS: Ventricular enlargement is again seen which is out of   proportion to sulcal prominence. A prominent flow jet is seen on the   noncompensated sagittally acquired 3D T2 SPACE sequence within the   cerebral aqueduct.    There is an incidental cavum septum pellucidum et vergae.    Multiple rounded nonspecific T2/FLAIR hyperintense signal changes are   noted throughout the bihemispheric white matter without associated mass   effect or restricted diffusion.    No abnormal brain parenchymal or leptomeningeal enhancement is seen.    No abnormal extra-axial fluid collections are seen. Flow-voids are noted   throughout the major intracranial vessels, on the T2 weighted images,   consistent with their patency. The sellar location appears unremarkable.    Scattered mucosal thickening is seen throughout the paranasal sinuses.   The bilateral tympanomastoid cavities are clear. The calvarium appears   intact. There is evidence of bilateral cataract removal. A right-sided   glaucoma shunt catheter is seen around the globe.    Left-sided occipital and suboccipital soft tissue thickening is again   seen and appears unchanged.    IMPRESSION: Ventricular enlargement which is unremarkable to sulcal   prominence which can be seen with normal pressure or communicating   hydrocephalus. Clinical correlation is required.    No acute intracranial hemorrhage or evidence of acute ischemia.    Multiple nonspecific abnormal white matter foci of T2/FLAIR prolongation   statistically favoring microvascular type changes.    Unchanged left-sided occipital and suboccipital soft tissue thickening."        A lumbar puncture was performed  BIBEMS from home 5/30/24.  History from Admission H&P    <Start of quote(s) from H&P>  "Reason for Admission: AMS  History of Present Illness:   74M PMH dementia, DM, and HTN presenting to the ED with AMS. Pt seen at bedside AAOX2, states he does not know why he was brought to the hospital, but was complaining of bilateral knee pain. Daughter called for collateral information, reports that for the past week, her father has become more confused and aggressive at home, pulling out electrical wires at home and trying to flush his clothes down the toilet. He has a HHA during the day for 12 hours x 5 days and his granddaughter stays the rest of the night with him, however, he has been becoming more aggressive and cursing out the granddaughter. He has been having urinary and bowel incontinence. He is blind in both eyes and ambulates with a walker however has been losing his balance lately with no falls. He denies any fevers, chills, CP, SOB, N/V/D, abdominal pain, dysuria, numbness/tingling in extremities.      Review of Systems:  Review of Systems: CONSTITUTIONAL: No fever, weight loss, or fatigue  RESPIRATORY: No cough, wheezing, chills or hemoptysis; No shortness of breath  CARDIOVASCULAR: No chest pain, palpitations, dizziness, or leg swelling  GASTROINTESTINAL: No abdominal pain. No nausea, vomiting, or hematemesis; No diarrhea or constipation. No melena or hematochezia.  GENITOURINARY: No dysuria or hematuria, urinary frequency  NEUROLOGICAL: No headaches, memory loss, loss of strength, numbness, or tremors  ENDOCRINE: No polyuria, polydipsia, or heat/cold intolerance  MUSKULOSKELETAL: No muscle aches, joint pains  HEME: no easy bruisability, no tender or enlarged lymph nodes  SKIN: No itching, burning, rashes, or lesions .   . . .     * Outpatient Medication Status not yet specified   . . .       Patient History:    Social History:  · Substance use	No"  <End of quote(s) from H&P>    Per radiology report of non-con head CT 5/30/24:  "COMPARISON EXAMINATION: None.    FINDINGS: The study is degraded by motion artifact    VENTRICLES AND SULCI:  There is ventricular prominence with rounding of   the frontal horns and prominence of the third ventricle. There is sulcal   effacement toward the vertex and a narrow callosal angle. Findings raise   the possibility of normal pressure hydrocephalus. There is a cavum septum   pellucidum and vergae.  INTRA-AXIAL:  No mass effect or hemorrhage identified given scan   limitations.  EXTRA-AXIAL:  No mass or collection is seen.  VISUALIZED SINUSES:  Mild mucosal thickening with left sphenoid foamy   secretions  VISUALIZED MASTOIDS:  Clear.  CALVARIUM: Normal.  MISCELLANEOUS:  Scalp thickening is seen in the left occipital region.   There is a questionable soft tissue defect within the left frontal scalp.  Right-sided glaucoma drainage device in place.    IMPRESSION:  Motion limited exam.    Ventricular prominence with findings suggesting normal pressure   hydrocephalus.    No mass effect or hemorrhage identified.    Left occipital scalp soft tissue thickening for which clinical   correlation is advised."      Per radiology report of non-con MR head with CSF flow 5/30/24:  "COMPARISON: Prior CT study of the head from 5/30/2024.    FINDINGS: Ventricular enlargement is again seen which is out of   proportion to sulcal prominence. A prominent flow jet is seen on the   noncompensated sagittally acquired 3D T2 SPACE sequence within the   cerebral aqueduct.    There is an incidental cavum septum pellucidum et vergae.    Multiple rounded nonspecific T2/FLAIR hyperintense signal changes are   noted throughout the bihemispheric white matter without associated mass   effect or restricted diffusion.    No abnormal brain parenchymal or leptomeningeal enhancement is seen.    No abnormal extra-axial fluid collections are seen. Flow-voids are noted   throughout the major intracranial vessels, on the T2 weighted images,   consistent with their patency. The sellar location appears unremarkable.    Scattered mucosal thickening is seen throughout the paranasal sinuses.   The bilateral tympanomastoid cavities are clear. The calvarium appears   intact. There is evidence of bilateral cataract removal. A right-sided   glaucoma shunt catheter is seen around the globe.    Left-sided occipital and suboccipital soft tissue thickening is again   seen and appears unchanged.    IMPRESSION: Ventricular enlargement which is unremarkable to sulcal   prominence which can be seen with normal pressure or communicating   hydrocephalus. Clinical correlation is required.    No acute intracranial hemorrhage or evidence of acute ischemia.    Multiple nonspecific abnormal white matter foci of T2/FLAIR prolongation   statistically favoring microvascular type changes.    Unchanged left-sided occipital and suboccipital soft tissue thickening."        A lumbar puncture was performed 6/5/24 at 14:42.  Opening pressure was recorded as 17mm H20.  Most assuredly what was meant was 17cm (=170mm); ir would not be possible to record a pressure as low as 17mm H20 given the size of the stop cock and lowest markings on the manometer.  It was recorded that 30mL of clear fluid was obtained.   As I understand Pt remained recumbent following the procedure.    CSF glucose  132  (Serum glucose that day at 11:35 was 235; at 15:52 it was 248.  Most values have been>200; some have been <200.)  CSF [id number]  RBCs  0  Nucleated cells  5    xx   BIBEMS from home 5/30/24.  History from Admission H&P    <Start of quote(s) from H&P>  "Reason for Admission: AMS  History of Present Illness:   74M PMH dementia, DM, and HTN presenting to the ED with AMS. Pt seen at bedside AAOX2, states he does not know why he was brought to the hospital, but was complaining of bilateral knee pain. Daughter called for collateral information, reports that for the past week, her father has become more confused and aggressive at home, pulling out electrical wires at home and trying to flush his clothes down the toilet. He has a HHA during the day for 12 hours x 5 days and his granddaughter stays the rest of the night with him, however, he has been becoming more aggressive and cursing out the granddaughter. He has been having urinary and bowel incontinence. He is blind in both eyes and ambulates with a walker however has been losing his balance lately with no falls. He denies any fevers, chills, CP, SOB, N/V/D, abdominal pain, dysuria, numbness/tingling in extremities.      Review of Systems:  Review of Systems: CONSTITUTIONAL: No fever, weight loss, or fatigue  RESPIRATORY: No cough, wheezing, chills or hemoptysis; No shortness of breath  CARDIOVASCULAR: No chest pain, palpitations, dizziness, or leg swelling  GASTROINTESTINAL: No abdominal pain. No nausea, vomiting, or hematemesis; No diarrhea or constipation. No melena or hematochezia.  GENITOURINARY: No dysuria or hematuria, urinary frequency  NEUROLOGICAL: No headaches, memory loss, loss of strength, numbness, or tremors  ENDOCRINE: No polyuria, polydipsia, or heat/cold intolerance  MUSKULOSKELETAL: No muscle aches, joint pains  HEME: no easy bruisability, no tender or enlarged lymph nodes  SKIN: No itching, burning, rashes, or lesions .   . . .     * Outpatient Medication Status not yet specified   . . .       Patient History:    Social History:  · Substance use	No"  <End of quote(s) from H&P>    Per radiology report of non-con head CT 5/30/24:  "COMPARISON EXAMINATION: None.    FINDINGS: The study is degraded by motion artifact    VENTRICLES AND SULCI:  There is ventricular prominence with rounding of   the frontal horns and prominence of the third ventricle. There is sulcal   effacement toward the vertex and a narrow callosal angle. Findings raise   the possibility of normal pressure hydrocephalus. There is a cavum septum   pellucidum and vergae.  INTRA-AXIAL:  No mass effect or hemorrhage identified given scan   limitations.  EXTRA-AXIAL:  No mass or collection is seen.  VISUALIZED SINUSES:  Mild mucosal thickening with left sphenoid foamy   secretions  VISUALIZED MASTOIDS:  Clear.  CALVARIUM: Normal.  MISCELLANEOUS:  Scalp thickening is seen in the left occipital region.   There is a questionable soft tissue defect within the left frontal scalp.  Right-sided glaucoma drainage device in place.    IMPRESSION:  Motion limited exam.    Ventricular prominence with findings suggesting normal pressure   hydrocephalus.    No mass effect or hemorrhage identified.    Left occipital scalp soft tissue thickening for which clinical   correlation is advised."      Per radiology report of non-con MR head with CSF flow 5/30/24:  "COMPARISON: Prior CT study of the head from 5/30/2024.    FINDINGS: Ventricular enlargement is again seen which is out of   proportion to sulcal prominence. A prominent flow jet is seen on the   noncompensated sagittally acquired 3D T2 SPACE sequence within the   cerebral aqueduct.    There is an incidental cavum septum pellucidum et vergae.    Multiple rounded nonspecific T2/FLAIR hyperintense signal changes are   noted throughout the bihemispheric white matter without associated mass   effect or restricted diffusion.    No abnormal brain parenchymal or leptomeningeal enhancement is seen.    No abnormal extra-axial fluid collections are seen. Flow-voids are noted   throughout the major intracranial vessels, on the T2 weighted images,   consistent with their patency. The sellar location appears unremarkable.    Scattered mucosal thickening is seen throughout the paranasal sinuses.   The bilateral tympanomastoid cavities are clear. The calvarium appears   intact. There is evidence of bilateral cataract removal. A right-sided   glaucoma shunt catheter is seen around the globe.    Left-sided occipital and suboccipital soft tissue thickening is again   seen and appears unchanged.    IMPRESSION: Ventricular enlargement which is unremarkable to sulcal   prominence which can be seen with normal pressure or communicating   hydrocephalus. Clinical correlation is required.    No acute intracranial hemorrhage or evidence of acute ischemia.    Multiple nonspecific abnormal white matter foci of T2/FLAIR prolongation   statistically favoring microvascular type changes.    Unchanged left-sided occipital and suboccipital soft tissue thickening."    I personally reviewed imaging studies.  To my eye the high convexity CSF space seems narrow; however I did not find coronal MR cuts which would be optimal for assessing this.  Sylvian fissures appear to be somewhat disproportionally enlarged.  These two findings would be expected in [facility] but are not by themselves diagnostic.  However, I did not note entrapped sulci.      A lumbar puncture was performed 6/5/24 at 14:42.  Opening pressure was recorded as 17mm H20.  Most assuredly what was meant was 17cm (=170mm); ir would not be possible to record a pressure as low as 17mm H20 given the size of the stop cock and lowest markings on the manometer.  It was recorded that 30mL of clear fluid was obtained.   As I understand Pt remained recumbent following the procedure.    CSF glucose  132  (Serum glucose that day at 11:35 was 235; at 15:52 it was 248.  Almost every value has been >140 and <260 this hospitalization.)  CSF [id number]  RBCs  0  Nucleated cells  5  Gram stain  neg  Cx  no growth  PCR panel  all neg    Serum albumin  3.6  on 5/30.    Ammonia  28 on 5/30.          EXAMINATION    Supine in bed, asleep.         BIBEMS from home 5/30/24.  History from Admission H&P    <Start of quote(s) from H&P>  "Reason for Admission: AMS  History of Present Illness:   74M PMH dementia, DM, and HTN presenting to the ED with AMS. Pt seen at bedside AAOX2, states he does not know why he was brought to the hospital, but was complaining of bilateral knee pain. Daughter called for collateral information, reports that for the past week, her father has become more confused and aggressive at home, pulling out electrical wires at home and trying to flush his clothes down the toilet. He has a HHA during the day for 12 hours x 5 days and his granddaughter stays the rest of the night with him, however, he has been becoming more aggressive and cursing out the granddaughter. He has been having urinary and bowel incontinence. He is blind in both eyes and ambulates with a walker however has been losing his balance lately with no falls. He denies any fevers, chills, CP, SOB, N/V/D, abdominal pain, dysuria, numbness/tingling in extremities.      Review of Systems:  Review of Systems: CONSTITUTIONAL: No fever, weight loss, or fatigue  RESPIRATORY: No cough, wheezing, chills or hemoptysis; No shortness of breath  CARDIOVASCULAR: No chest pain, palpitations, dizziness, or leg swelling  GASTROINTESTINAL: No abdominal pain. No nausea, vomiting, or hematemesis; No diarrhea or constipation. No melena or hematochezia.  GENITOURINARY: No dysuria or hematuria, urinary frequency  NEUROLOGICAL: No headaches, memory loss, loss of strength, numbness, or tremors  ENDOCRINE: No polyuria, polydipsia, or heat/cold intolerance  MUSKULOSKELETAL: No muscle aches, joint pains  HEME: no easy bruisability, no tender or enlarged lymph nodes  SKIN: No itching, burning, rashes, or lesions .   . . .     * Outpatient Medication Status not yet specified   . . .       Patient History:    Social History:  · Substance use	No"  <End of quote(s) from H&P>    Per radiology report of non-con head CT 5/30/24:  "COMPARISON EXAMINATION: None.    FINDINGS: The study is degraded by motion artifact    VENTRICLES AND SULCI:  There is ventricular prominence with rounding of   the frontal horns and prominence of the third ventricle. There is sulcal   effacement toward the vertex and a narrow callosal angle. Findings raise   the possibility of normal pressure hydrocephalus. There is a cavum septum   pellucidum and vergae.  INTRA-AXIAL:  No mass effect or hemorrhage identified given scan   limitations.  EXTRA-AXIAL:  No mass or collection is seen.  VISUALIZED SINUSES:  Mild mucosal thickening with left sphenoid foamy   secretions  VISUALIZED MASTOIDS:  Clear.  CALVARIUM: Normal.  MISCELLANEOUS:  Scalp thickening is seen in the left occipital region.   There is a questionable soft tissue defect within the left frontal scalp.  Right-sided glaucoma drainage device in place.    IMPRESSION:  Motion limited exam.    Ventricular prominence with findings suggesting normal pressure   hydrocephalus.    No mass effect or hemorrhage identified.    Left occipital scalp soft tissue thickening for which clinical   correlation is advised."      Per radiology report of non-con MR head with CSF flow 5/30/24:  "COMPARISON: Prior CT study of the head from 5/30/2024.    FINDINGS: Ventricular enlargement is again seen which is out of   proportion to sulcal prominence. A prominent flow jet is seen on the   noncompensated sagittally acquired 3D T2 SPACE sequence within the   cerebral aqueduct.    There is an incidental cavum septum pellucidum et vergae.    Multiple rounded nonspecific T2/FLAIR hyperintense signal changes are   noted throughout the bihemispheric white matter without associated mass   effect or restricted diffusion.    No abnormal brain parenchymal or leptomeningeal enhancement is seen.    No abnormal extra-axial fluid collections are seen. Flow-voids are noted   throughout the major intracranial vessels, on the T2 weighted images,   consistent with their patency. The sellar location appears unremarkable.    Scattered mucosal thickening is seen throughout the paranasal sinuses.   The bilateral tympanomastoid cavities are clear. The calvarium appears   intact. There is evidence of bilateral cataract removal. A right-sided   glaucoma shunt catheter is seen around the globe.    Left-sided occipital and suboccipital soft tissue thickening is again   seen and appears unchanged.    IMPRESSION: Ventricular enlargement which is unremarkable to sulcal   prominence which can be seen with normal pressure or communicating   hydrocephalus. Clinical correlation is required.    No acute intracranial hemorrhage or evidence of acute ischemia.    Multiple nonspecific abnormal white matter foci of T2/FLAIR prolongation   statistically favoring microvascular type changes.    Unchanged left-sided occipital and suboccipital soft tissue thickening."    I personally reviewed imaging studies.  To my eye the high convexity CSF space seems narrow; however I did not find coronal MR cuts which would be optimal for assessing this.  Sylvian fissures appear to be somewhat disproportionally enlarged.  These two findings would be expected in [facility] but are not by themselves diagnostic.  However, I did not note entrapped sulci.      A lumbar puncture was performed 6/5/24 at 14:42.  Opening pressure was recorded as 17mm H20.  Most assuredly what was meant was 17cm (=170mm); ir would not be possible to record a pressure as low as 17mm H20 given the size of the stop cock and lowest markings on the manometer.  It was recorded that 30mL of clear fluid was obtained.   As I understand Pt remained recumbent following the procedure.    CSF glucose  132  (Serum glucose that day at 11:35 was 235; at 15:52 it was 248.  Almost every value has been >140 and <260 this hospitalization.)  CSF [id number]  RBCs  0  Nucleated cells  5  Gram stain  neg  Cx  no growth  PCR panel  all neg    Serum albumin  3.6  on 5/30.    Ammonia  28 on 5/30.          EXAMINATION    Daughter present at bedside.  Supine in bed, asleep.         BIBEMS from home 5/30/24.  History from Admission H&P    <Start of quote(s) from H&P>  "Reason for Admission: AMS  History of Present Illness:   74M PMH dementia, DM, and HTN presenting to the ED with AMS. Pt seen at bedside AAOX2, states he does not know why he was brought to the hospital, but was complaining of bilateral knee pain. Daughter called for collateral information, reports that for the past week, her father has become more confused and aggressive at home, pulling out electrical wires at home and trying to flush his clothes down the toilet. He has a HHA during the day for 12 hours x 5 days and his granddaughter stays the rest of the night with him, however, he has been becoming more aggressive and cursing out the granddaughter. He has been having urinary and bowel incontinence. He is blind in both eyes and ambulates with a walker however has been losing his balance lately with no falls. He denies any fevers, chills, CP, SOB, N/V/D, abdominal pain, dysuria, numbness/tingling in extremities.      Review of Systems:  Review of Systems: CONSTITUTIONAL: No fever, weight loss, or fatigue  RESPIRATORY: No cough, wheezing, chills or hemoptysis; No shortness of breath  CARDIOVASCULAR: No chest pain, palpitations, dizziness, or leg swelling  GASTROINTESTINAL: No abdominal pain. No nausea, vomiting, or hematemesis; No diarrhea or constipation. No melena or hematochezia.  GENITOURINARY: No dysuria or hematuria, urinary frequency  NEUROLOGICAL: No headaches, memory loss, loss of strength, numbness, or tremors  ENDOCRINE: No polyuria, polydipsia, or heat/cold intolerance  MUSKULOSKELETAL: No muscle aches, joint pains  HEME: no easy bruisability, no tender or enlarged lymph nodes  SKIN: No itching, burning, rashes, or lesions .   . . .     * Outpatient Medication Status not yet specified   . . .       Patient History:    Social History:  · Substance use	No"  <End of quote(s) from H&P>    Per radiology report of non-con head CT 5/30/24:  "COMPARISON EXAMINATION: None.    FINDINGS: The study is degraded by motion artifact    VENTRICLES AND SULCI:  There is ventricular prominence with rounding of   the frontal horns and prominence of the third ventricle. There is sulcal   effacement toward the vertex and a narrow callosal angle. Findings raise   the possibility of normal pressure hydrocephalus. There is a cavum septum   pellucidum and vergae.  INTRA-AXIAL:  No mass effect or hemorrhage identified given scan   limitations.  EXTRA-AXIAL:  No mass or collection is seen.  VISUALIZED SINUSES:  Mild mucosal thickening with left sphenoid foamy   secretions  VISUALIZED MASTOIDS:  Clear.  CALVARIUM: Normal.  MISCELLANEOUS:  Scalp thickening is seen in the left occipital region.   There is a questionable soft tissue defect within the left frontal scalp.  Right-sided glaucoma drainage device in place.    IMPRESSION:  Motion limited exam.    Ventricular prominence with findings suggesting normal pressure   hydrocephalus.    No mass effect or hemorrhage identified.    Left occipital scalp soft tissue thickening for which clinical   correlation is advised."      Per radiology report of non-con MR head with CSF flow 5/30/24:  "COMPARISON: Prior CT study of the head from 5/30/2024.    FINDINGS: Ventricular enlargement is again seen which is out of   proportion to sulcal prominence. A prominent flow jet is seen on the   noncompensated sagittally acquired 3D T2 SPACE sequence within the   cerebral aqueduct.    There is an incidental cavum septum pellucidum et vergae.    Multiple rounded nonspecific T2/FLAIR hyperintense signal changes are   noted throughout the bihemispheric white matter without associated mass   effect or restricted diffusion.    No abnormal brain parenchymal or leptomeningeal enhancement is seen.    No abnormal extra-axial fluid collections are seen. Flow-voids are noted   throughout the major intracranial vessels, on the T2 weighted images,   consistent with their patency. The sellar location appears unremarkable.    Scattered mucosal thickening is seen throughout the paranasal sinuses.   The bilateral tympanomastoid cavities are clear. The calvarium appears   intact. There is evidence of bilateral cataract removal. A right-sided   glaucoma shunt catheter is seen around the globe.    Left-sided occipital and suboccipital soft tissue thickening is again   seen and appears unchanged.    IMPRESSION: Ventricular enlargement which is unremarkable to sulcal   prominence which can be seen with normal pressure or communicating   hydrocephalus. Clinical correlation is required.    No acute intracranial hemorrhage or evidence of acute ischemia.    Multiple nonspecific abnormal white matter foci of T2/FLAIR prolongation   statistically favoring microvascular type changes.    Unchanged left-sided occipital and suboccipital soft tissue thickening."    I personally reviewed imaging studies.  To my eye the high convexity CSF space seems narrow; however I did not find coronal MR cuts which would be optimal for assessing this.  Sylvian fissures appear to be somewhat disproportionally enlarged.  These two findings would be expected in [facility] but are not by themselves diagnostic.  However, I did not note entrapped sulci.      A lumbar puncture was performed 6/5/24 at 14:42.  Opening pressure was recorded as 17mm H20.  Most assuredly what was meant was 17cm (=170mm); ir would not be possible to record a pressure as low as 17mm H20 given the size of the stop cock and lowest markings on the manometer.  It was recorded that 30mL of clear fluid was obtained.   As I understand Pt remained recumbent following the procedure.    CSF glucose  132  (Serum glucose that day at 11:35 was 235; at 15:52 it was 248.  Almost every value has been >140 and <260 this hospitalization.)  CSF [id number]  RBCs  0  Nucleated cells  5  Gram stain  neg  Cx  no growth  PCR panel  all neg    Serum albumin  3.6  on 5/30.    Ammonia  28 on 5/30.          EXAMINATION    Daughter present at bedside.  Pt supine in bed, asleep.  Irritable at being awakened.  Pupil 3mm round/7mm irregular on the R/L; both non-reactive.  Per daughter pt has completely lost vision in each eye, one at a time.  EOMs grossly appesr to be conjugate.  Moderately dysarthric speech.  He provides his name; location is "[facility]'  Asked his age he says "50" (perhaps because he was born in 1950?).  He was asked to sit up, answering "I can't be bothered with sitting up."  Does not follow instructions to hold up limbs or offer resistance so the examiner could evaluate limb motor function.      Neck tonus: it is not possible to passively turn his neck to the R of midline; +++ tonus on passive turning to the L and on passive flexion.  Cannot test neck extension as Pt resists passive elevation of the torso.      Tonus at R/L elbows  ++/normal.  Tonus at R/L knees  ++++/+++    Reflex                           Right    Left   Comment    Biceps                             1         1  Triceps                            0         0  Richey                       absent  absent  Suprapatellar                    0        0  Patellar                            0        0  Gastroc                             0       0  Plantar                          flexor  flexor    Does not co-operate with P/LT sensory testing.  Becomes very irritated when           BIBEMS from home 5/30/24.  History from Admission H&P    <Start of quote(s) from H&P>  "Reason for Admission: AMS  History of Present Illness:   74M PMH dementia, DM, and HTN presenting to the ED with AMS. Pt seen at bedside AAOX2, states he does not know why he was brought to the hospital, but was complaining of bilateral knee pain. Daughter called for collateral information, reports that for the past week, her father has become more confused and aggressive at home, pulling out electrical wires at home and trying to flush his clothes down the toilet. He has a HHA during the day for 12 hours x 5 days and his granddaughter stays the rest of the night with him, however, he has been becoming more aggressive and cursing out the granddaughter. He has been having urinary and bowel incontinence. He is blind in both eyes and ambulates with a walker however has been losing his balance lately with no falls. He denies any fevers, chills, CP, SOB, N/V/D, abdominal pain, dysuria, numbness/tingling in extremities.      Review of Systems:  Review of Systems: CONSTITUTIONAL: No fever, weight loss, or fatigue  RESPIRATORY: No cough, wheezing, chills or hemoptysis; No shortness of breath  CARDIOVASCULAR: No chest pain, palpitations, dizziness, or leg swelling  GASTROINTESTINAL: No abdominal pain. No nausea, vomiting, or hematemesis; No diarrhea or constipation. No melena or hematochezia.  GENITOURINARY: No dysuria or hematuria, urinary frequency  NEUROLOGICAL: No headaches, memory loss, loss of strength, numbness, or tremors  ENDOCRINE: No polyuria, polydipsia, or heat/cold intolerance  MUSKULOSKELETAL: No muscle aches, joint pains  HEME: no easy bruisability, no tender or enlarged lymph nodes  SKIN: No itching, burning, rashes, or lesions .   . . .     * Outpatient Medication Status not yet specified   . . .       Patient History:    Social History:  · Substance use	No"  <End of quote(s) from H&P>    Per radiology report of non-con head CT 5/30/24:  "COMPARISON EXAMINATION: None.    FINDINGS: The study is degraded by motion artifact    VENTRICLES AND SULCI:  There is ventricular prominence with rounding of   the frontal horns and prominence of the third ventricle. There is sulcal   effacement toward the vertex and a narrow callosal angle. Findings raise   the possibility of normal pressure hydrocephalus. There is a cavum septum   pellucidum and vergae.  INTRA-AXIAL:  No mass effect or hemorrhage identified given scan   limitations.  EXTRA-AXIAL:  No mass or collection is seen.  VISUALIZED SINUSES:  Mild mucosal thickening with left sphenoid foamy   secretions  VISUALIZED MASTOIDS:  Clear.  CALVARIUM: Normal.  MISCELLANEOUS:  Scalp thickening is seen in the left occipital region.   There is a questionable soft tissue defect within the left frontal scalp.  Right-sided glaucoma drainage device in place.    IMPRESSION:  Motion limited exam.    Ventricular prominence with findings suggesting normal pressure   hydrocephalus.    No mass effect or hemorrhage identified.    Left occipital scalp soft tissue thickening for which clinical   correlation is advised."      Per radiology report of non-con MR head with CSF flow 5/30/24:  "COMPARISON: Prior CT study of the head from 5/30/2024.    FINDINGS: Ventricular enlargement is again seen which is out of   proportion to sulcal prominence. A prominent flow jet is seen on the   noncompensated sagittally acquired 3D T2 SPACE sequence within the   cerebral aqueduct.    There is an incidental cavum septum pellucidum et vergae.    Multiple rounded nonspecific T2/FLAIR hyperintense signal changes are   noted throughout the bihemispheric white matter without associated mass   effect or restricted diffusion.    No abnormal brain parenchymal or leptomeningeal enhancement is seen.    No abnormal extra-axial fluid collections are seen. Flow-voids are noted   throughout the major intracranial vessels, on the T2 weighted images,   consistent with their patency. The sellar location appears unremarkable.    Scattered mucosal thickening is seen throughout the paranasal sinuses.   The bilateral tympanomastoid cavities are clear. The calvarium appears   intact. There is evidence of bilateral cataract removal. A right-sided   glaucoma shunt catheter is seen around the globe.    Left-sided occipital and suboccipital soft tissue thickening is again   seen and appears unchanged.    IMPRESSION: Ventricular enlargement which is unremarkable to sulcal   prominence which can be seen with normal pressure or communicating   hydrocephalus. Clinical correlation is required.    No acute intracranial hemorrhage or evidence of acute ischemia.    Multiple nonspecific abnormal white matter foci of T2/FLAIR prolongation   statistically favoring microvascular type changes.    Unchanged left-sided occipital and suboccipital soft tissue thickening."    I personally reviewed imaging studies.  To my eye the high convexity CSF space seems narrow; however I did not find coronal MR cuts which would be optimal for assessing this.  Sylvian fissures appear to be somewhat disproportionally enlarged.  These two findings would be expected in [facility] but are not by themselves diagnostic.  However, I did not note entrapped sulci.      A lumbar puncture was performed 6/5/24 at 14:42.  Opening pressure was recorded as 17mm H20.  Most assuredly what was meant was 17cm (=170mm); ir would not be possible to record a pressure as low as 17mm H20 given the size of the stop cock and lowest markings on the manometer.  It was recorded that 30mL of clear fluid was obtained.   As I understand Pt remained recumbent following the procedure.    CSF glucose  132  (Serum glucose that day at 11:35 was 235; at 15:52 it was 248.  Almost every value has been >140 and <260 this hospitalization.)  CSF [id number]  RBCs  0  Nucleated cells  5  Gram stain  neg  Cx  no growth  PCR panel  all neg    Serum albumin  3.6  on 5/30.    Ammonia  28 on 5/30.    HgbA1c  11.7%  BUN/creat  46/1.56 on admossion.      EXAMINATION    Daughter present at bedside.  Pt supine in bed, asleep.  Irritable at being awakened.  Pupil 3mm round/7mm irregular on the R/L; both non-reactive.  Per daughter pt has completely lost vision in each eye, one at a time.  EOMs grossly appesr to be conjugate.  Moderately dysarthric speech.  He provides his name; location is "[facility]'  Asked his age he says "50" (perhaps because he was born in 1950?).  He was asked to sit up, answering "I can't be bothered with sitting up."  Does not follow instructions to hold up limbs or offer resistance so the examiner could evaluate limb motor function.      Neck tonus: it is not possible to passively turn his neck to the R of midline; +++ tonus on passive turning to the L and on passive flexion.  Cannot test neck extension as Pt resists passive elevation of the torso.      Tonus at R/L elbows  ++/normal.  Tonus at R/L knees  ++++/+++    Reflex                           Right    Left   Comment    Biceps                             1         1  Triceps                            0         0  Richey                       absent  absent  Suprapatellar                    0        0  Patellar                            0        0  Gastroc                             0       0  Plantar                          flexor  flexor    Does not co-operate with P/LT sensory testing.  Becomes very irritated when           BIBEMS from home 5/30/24.  History from Admission H&P    <Start of quote(s) from H&P>  "Reason for Admission: AMS  History of Present Illness:   74M PMH dementia, DM, and HTN presenting to the ED with AMS. Pt seen at bedside AAOX2, states he does not know why he was brought to the hospital, but was complaining of bilateral knee pain. Daughter called for collateral information, reports that for the past week, her father has become more confused and aggressive at home, pulling out electrical wires at home and trying to flush his clothes down the toilet. He has a HHA during the day for 12 hours x 5 days and his granddaughter stays the rest of the night with him, however, he has been becoming more aggressive and cursing out the granddaughter. He has been having urinary and bowel incontinence. He is blind in both eyes and ambulates with a walker however has been losing his balance lately with no falls. He denies any fevers, chills, CP, SOB, N/V/D, abdominal pain, dysuria, numbness/tingling in extremities.      Review of Systems:  Review of Systems: CONSTITUTIONAL: No fever, weight loss, or fatigue  RESPIRATORY: No cough, wheezing, chills or hemoptysis; No shortness of breath  CARDIOVASCULAR: No chest pain, palpitations, dizziness, or leg swelling  GASTROINTESTINAL: No abdominal pain. No nausea, vomiting, or hematemesis; No diarrhea or constipation. No melena or hematochezia.  GENITOURINARY: No dysuria or hematuria, urinary frequency  NEUROLOGICAL: No headaches, memory loss, loss of strength, numbness, or tremors  ENDOCRINE: No polyuria, polydipsia, or heat/cold intolerance  MUSKULOSKELETAL: No muscle aches, joint pains  HEME: no easy bruisability, no tender or enlarged lymph nodes  SKIN: No itching, burning, rashes, or lesions .   . . .     * Outpatient Medication Status not yet specified   . . .       Patient History:    Social History:  · Substance use	No"  <End of quote(s) from H&P>    Per radiology report of non-con head CT 5/30/24:  "COMPARISON EXAMINATION: None.    FINDINGS: The study is degraded by motion artifact    VENTRICLES AND SULCI:  There is ventricular prominence with rounding of   the frontal horns and prominence of the third ventricle. There is sulcal   effacement toward the vertex and a narrow callosal angle. Findings raise   the possibility of normal pressure hydrocephalus. There is a cavum septum   pellucidum and vergae.  INTRA-AXIAL:  No mass effect or hemorrhage identified given scan   limitations.  EXTRA-AXIAL:  No mass or collection is seen.  VISUALIZED SINUSES:  Mild mucosal thickening with left sphenoid foamy   secretions  VISUALIZED MASTOIDS:  Clear.  CALVARIUM: Normal.  MISCELLANEOUS:  Scalp thickening is seen in the left occipital region.   There is a questionable soft tissue defect within the left frontal scalp.  Right-sided glaucoma drainage device in place.    IMPRESSION:  Motion limited exam.    Ventricular prominence with findings suggesting normal pressure   hydrocephalus.    No mass effect or hemorrhage identified.    Left occipital scalp soft tissue thickening for which clinical   correlation is advised."      Per radiology report of non-con MR head with CSF flow 5/30/24:  "COMPARISON: Prior CT study of the head from 5/30/2024.    FINDINGS: Ventricular enlargement is again seen which is out of   proportion to sulcal prominence. A prominent flow jet is seen on the   noncompensated sagittally acquired 3D T2 SPACE sequence within the   cerebral aqueduct.    There is an incidental cavum septum pellucidum et vergae.    Multiple rounded nonspecific T2/FLAIR hyperintense signal changes are   noted throughout the bihemispheric white matter without associated mass   effect or restricted diffusion.    No abnormal brain parenchymal or leptomeningeal enhancement is seen.    No abnormal extra-axial fluid collections are seen. Flow-voids are noted   throughout the major intracranial vessels, on the T2 weighted images,   consistent with their patency. The sellar location appears unremarkable.    Scattered mucosal thickening is seen throughout the paranasal sinuses.   The bilateral tympanomastoid cavities are clear. The calvarium appears   intact. There is evidence of bilateral cataract removal. A right-sided   glaucoma shunt catheter is seen around the globe.    Left-sided occipital and suboccipital soft tissue thickening is again   seen and appears unchanged.    IMPRESSION: Ventricular enlargement which is unremarkable to sulcal   prominence which can be seen with normal pressure or communicating   hydrocephalus. Clinical correlation is required.    No acute intracranial hemorrhage or evidence of acute ischemia.    Multiple nonspecific abnormal white matter foci of T2/FLAIR prolongation   statistically favoring microvascular type changes.    Unchanged left-sided occipital and suboccipital soft tissue thickening."    I personally reviewed imaging studies.  To my eye the high convexity CSF space seems narrow; however I did not find coronal MR cuts which would be optimal for assessing this.  Sylvian fissures appear to be somewhat disproportionally enlarged.  These two findings would be expected in [facility] but are not by themselves diagnostic.  However, I did not note entrapped sulci.      A lumbar puncture was performed 6/5/24 at 14:42.  Opening pressure was recorded as 17mm H20.  Most assuredly what was meant was 17cm (=170mm); ir would not be possible to record a pressure as low as 17mm H20 given the size of the stop cock and lowest markings on the manometer.  It was recorded that 30mL of clear fluid was obtained.   As I understand Pt remained recumbent following the procedure.    CSF glucose  132  (Serum glucose that day at 11:35 was 235; at 15:52 it was 248.  Almost every value has been >140 and <260 this hospitalization.)  CSF [id number]  RBCs  0  Nucleated cells  5  Gram stain  neg  Cx  no growth  PCR panel  all neg    Serum albumin  3.6  on 5/30.    Ammonia  28 on 5/30.    HgbA1c  11.7%  BUN/creat  46/1.56 on admission.      EXAMINATION    Daughter present at bedside.  Pt supine in bed, asleep.  Irritable at being awakened.  Pupil 3mm round/7mm irregular on the R/L; both non-reactive.  Per daughter pt has completely lost vision in each eye, one at a time.  EOMs grossly appesr to be conjugate.  Moderately dysarthric speech.  He provides his name; location is "[facility]'  Asked his age he says "50" (perhaps because he was born in 1950?).  He was asked to sit up, answering "I can't be bothered with sitting up."  Does not follow instructions to hold up limbs or offer resistance so the examiner could evaluate limb motor function.      Neck tonus: it is not possible to passively turn his neck to the R of midline; +++ tonus on passive turning to the L and on passive flexion.  Cannot test neck extension as Pt resists passive elevation of the torso.      Tonus at R/L elbows  ++/normal.  Tonus at R/L knees  ++++/+++    Reflex                           Right    Left   Comment    Biceps                             1         1  Triceps                            0         0  Richey                       absent  absent  Suprapatellar                    0        0  Patellar                            0        0  Gastroc                             0       0  Plantar                          flexor  flexor    Does not co-operate with P/LT sensory testing.  Becomes very irritated when      Intermittient low amplitude L forearm 4 Hz tremor.

## 2024-06-07 NOTE — PROGRESS NOTE ADULT - PROBLEM SELECTOR PLAN 8
Pt from home.   PT [facility]. Pending LP in AM  CM following
DVT ppx: Lovenox   GI ppx: PPI
Pt from home.   PT [facility]. Pending placement  CM following
DVT ppx: Hold Lovenox ( f/u when to resume)  GI ppx: PPI
DVT ppx: Hold Lovenox ( f/u when to resume)  GI ppx: PPI
Pt from home.   PT [facility].   CM following   f/u MRI and Neuro recs

## 2024-06-07 NOTE — CONSULT NOTE ADULT - ASSESSMENT
RECOMMENDATIONS    Routine lab work-up for dementia:    ESR, CRP, B12, SERUM folate, methylmalonic acid + homocysteine, copper, SERUM (NOT RBC) zinc, TSH, free thyroxine, RPR, TAMEKA.        Neurosurgical consultation for an opinion as to Dx of NPH and whether intervention would be a consideration                                                         IMPORTANT  -  PLEASE NOTE:                              I am a neurohospitalist. I do not see patients outside of the hospital.        Patients requiring neurological follow-up after discharge may contact one of the following offices.     [address] [address]  [phone]    [facility]  [address] [place] [address].  [place], [place]  [phone]    Holger Sorenson M.D.   - Department of Neurology  [employer] at [facility]     Acute encephalopathy.  Likely toxic meabolic, in the setting o     Baseline dementia by Hx.  R/O treatable/reversible causes    Longstanding blindness (?diabetic retinopathy).    VITA    Radiographic findings consistent with but by themselves not diagnostic  for NPH.    We have no gait assessment at this time.    The consensus is that if shunting were to be performed for treating NPH it should be before dementia supervenes.          RECOMMENDATIONS    In any case, routine lab work-up for dementia:    ESR, CRP, B12, SERUM folate, methylmalonic acid + homocysteine, copper, SERUM (NOT RBC) zinc, TSH, free thyroxine, RPR, TAMEKA.        Neurosurgical consultation for an opinion as to Dx of NPH and whether intervention would be a consideration                                                         IMPORTANT  -  PLEASE NOTE:                              I am a neurohospitalist. I do not see patients outside of the hospital.        Patients requiring neurological follow-up after discharge may contact one of the following offices.     [address] [place] [address] [phone]    [facility]  [address] [place] [address].  [place], [place]  [phone]    Holger Sorenson M.D.   - Department of Neurology  [employer] [facility]     Acute encephalopathy.  Likely toxic meabolic, in the setting o     Baseline dementia by Hx.  R/O treatable/reversible causes    Longstanding blindness (?diabetic retinopathy).    VITA    Nuchal >appendicular rigidity.      Asymmetric appendicular rigidity, L>R.      Exam findings raise suspicion for a "Parkinson plus" syndrome.    Radiographic findings consistent with but by themselves not diagnostic for NPH.    We have no gait assessment at this time.    The Parkinsonian features cloud the diagnostic picture.       The consensus is that if shunting were to be performed for treating NPH it should be before dementia supervenes.          RECOMMENDATIONS    In any case, routine lab work-up for dementia:    ESR, CRP, B12, SERUM folate, methylmalonic acid + homocysteine, copper, SERUM (NOT RBC) zinc, TSH, free thyroxine, RPR, TAMEKA.        If Pt/family wish to pursue the question of NPH and possible surgery (shunt placement) then obtain neurosurgical consultation for an opinion as to Dx of NPH and whether intervention would be a consideration                                                         IMPORTANT  -  PLEASE NOTE:                              I am a neurohospitalist. I do not see patients outside of the hospital.        Patients requiring neurological follow-up after discharge may contact one of the following offices.     [facility] [address]  [place], [place] [id number] [phone]    [facility]  [address] [facility].  [place], [place]  [phone]    Holger Sorenson M.D.   - Department of Neurology  [employer] at [facility]

## 2024-06-07 NOTE — PROGRESS NOTE ADULT - PROBLEM SELECTOR PLAN 1
presented with AMS, more aggressive and confused at home over the past week   daughter reports urinary and bowel incontinence as well as worsening imbalance, but no falls  CTH: Motion limited exam. Ventricular prominence with findings suggesting normal pressure hydrocephalus. No mass effect or hemorrhage identified. Left occipital scalp soft tissue thickening.  UA (-)  UTox (-)   symptoms 2/2 worsening dementia vs NPH  Continue Quetiapine , increase to 50mg po at 8-9pm. Increase PRN's to: Seroquel 50 mg PO q 6 hrs PRN for mild to moderate agitation/insomnia  Haldol 1 mg IM q 8 hrs PRN for severe agitation  Monitor QTC per EKG while on antipsychotic regimen.   Maintain safety measures.   MRI brain with CSF flow noncon: No acute findings  Family declined Lumbar puncture  Neurology Dr. Bonilla following  Psych following  PT consulted
presented with AMS, more aggressive and confused at home over the past week   daughter reports urinary and bowel incontinence as well as worsening imbalance, but no falls  CTH: Motion limited exam. Ventricular prominence with findings suggesting normal pressure hydrocephalus. No mass effect or hemorrhage identified. Left occipital scalp soft tissue thickening.  UA (-)  UTox (-)   symptoms 2/2 worsening dementia vs NPH  Continue Quetiapine , increase to 50mg po at 8-9pm. Increase PRN's to: Seroquel 50 mg PO q 6 hrs PRN for mild to moderate agitation/insomnia  Haldol 1 mg IM q 8 hrs PRN for severe agitation  Monitor QTC per EKG while on antipsychotic regimen.   Maintain safety measures.   MRI brain with CSF flow noncon: No acute findings  Family declined Lumbar puncture earlier but now amenable to LP  Plan for LP in AM by ICU NP   Hold Lovenox iso planned LP in AM  Neurology Dr. Bonilla following  Psych following  PT consulted
p/w AMS, more aggressive and confused at home   symptoms 2/2 worsening dementia vs NPH  CTH: Ventricular prominence with findings suggesting normal pressure hydrocephalus.  MRI brain normal pressure or communicating hydrocephalus.  UA (-). UTox (-)   Continue Quetiapine, Seroquel and Haldol for mild to moderate agitation/insomnia ( psych reccs)  Monitor QTC per EKG while on antipsychotic regimen. ---> EKG 6/5-   s/p LP 6/5/24, f/u neuro recs on csf fluid analysis  Neurology Dr. Bonilla following  Psych following  PT consulted
p/w AMS, more aggressive and confused at home   symptoms 2/2 worsening dementia vs NPH  CTH: Ventricular prominence with findings suggesting normal pressure hydrocephalus.  MRI brain normal pressure or communicating hydrocephalus.  UA (-). UTox (-)   Continue Quetiapine, Seroquel and Haldol for mild to moderate agitation/insomnia ( psych reccs)  Monitor QTC per EKG while on antipsychotic regimen. ---> EKG 6/5-   s/p LP 6/5/24, f/u neuro recs on csf fluid analysis  f/u when to resume Lovenox  Neurology Dr. Bonilla following  Psych following  PT consulted
presented with AMS, more aggressive and confused at home over the past week   daughter reports urinary and bowel incontinence as well as worsening imbalance, but no falls  CTH: Motion limited exam. Ventricular prominence with findings suggesting normal pressure hydrocephalus. No mass effect or hemorrhage identified. Left occipital scalp soft tissue thickening.  UA (-)  UTox (-)   symptoms 2/2 worsening dementia vs NPH  Continue Quetiapine , increase to 50mg po at 8-9pm. Increase PRN's to: Seroquel 50 mg PO q 6 hrs PRN for mild to moderate agitation/insomnia  Haldol 1 mg IM q 8 hrs PRN for severe agitation  Monitor QTC per EKG while on antipsychotic regimen.   Maintain safety measures.   f/u MRI brain with CSF flow noncon  Neurology Dr. Bonilla following  Psych following  PT consulted
p/w AMS, more aggressive and confused at home   symptoms 2/2 worsening dementia vs NPH  CTH: Ventricular prominence with findings suggesting normal pressure hydrocephalus.  MRI brain normal pressure or communicating hydrocephalus.  UA (-). UTox (-)   Continue Quetiapine, Seroquel and Haldol for mild to moderate agitation/insomnia ( psych reccs)  Monitor QTC per EKG while on antipsychotic regimen. ---> EKG 6/5-   s/p LP 6/5/24, f/u csf fluid analysis  f/u when to resume Lovenox  Neurology Dr. Bonilla following  Psych following  PT consulted

## 2024-06-07 NOTE — PROGRESS NOTE ADULT - ASSESSMENT
74M PMH dementia, DM, and HTN presenting to the ED with AMS admitted for acute encephalopathy 2/2 worsening dementia vs NPH  CTH: Motion limited exam. Ventricular prominence with findings suggesting normal pressure hydrocephalus. No mass effect or hemorrhage identified. Left occipital scalp soft tissue thickening. Neurology & psych consulted. 
74M PMH dementia, DM, and HTN presenting to the ED with AMS admitted for acute encephalopathy 2/2 worsening dementia vs NPH  CTH: Motion limited exam. Ventricular prominence with findings suggesting normal pressure hydrocephalus. No mass effect or hemorrhage identified. Left occipital scalp soft tissue thickening. Neurology & psych consulted. 
74M PMH dementia, DM, and HTN presenting to the ED with AMS admitted for acute encephalopathy 2/2 worsening dementia vs NPH
74M PMH dementia, DM, and HTN presenting to the ED with AMS admitted for acute encephalopathy 2/2 worsening dementia vs NPH  CTH: Motion limited exam. Ventricular prominence with findings suggesting normal pressure hydrocephalus. No mass effect or hemorrhage identified. Left occipital scalp soft tissue thickening. Neurology & psych consulted. 

## 2024-06-07 NOTE — PROGRESS NOTE ADULT - SUBJECTIVE AND OBJECTIVE BOX
NP Note discussed with  primary attending    Patient is a 74y old  Male who presents with a chief complaint of AMS (07 Jun 2024 10:01)      INTERVAL HPI/OVERNIGHT EVENTS: no new complaints    MEDICATIONS  (STANDING):  amLODIPine   Tablet 5 milliGRAM(s) Oral every 24 hours  atorvastatin 80 milliGRAM(s) Oral at bedtime  carvedilol 12.5 milliGRAM(s) Oral every 12 hours  gabapentin 300 milliGRAM(s) Oral daily  insulin glargine Injectable (LANTUS) 32 Unit(s) SubCutaneous at bedtime  insulin lispro (ADMELOG) corrective regimen sliding scale   SubCutaneous three times a day before meals  insulin lispro (ADMELOG) corrective regimen sliding scale   SubCutaneous at bedtime  insulin lispro Injectable (ADMELOG) 7 Unit(s) SubCutaneous three times a day before meals  latanoprost 0.005% Ophthalmic Solution 1 Drop(s) Both EYES at bedtime  lisinopril 20 milliGRAM(s) Oral daily  mirtazapine 7.5 milliGRAM(s) Oral at bedtime  pantoprazole    Tablet 40 milliGRAM(s) Oral before breakfast  polyethylene glycol 3350 17 Gram(s) Oral daily  QUEtiapine 50 milliGRAM(s) Oral at bedtime  senna 2 Tablet(s) Oral at bedtime    MEDICATIONS  (PRN):  acetaminophen     Tablet .. 650 milliGRAM(s) Oral every 6 hours PRN Temp greater or equal to 38C (100.4F), Mild Pain (1 - 3)  aluminum hydroxide/magnesium hydroxide/simethicone Suspension 30 milliLiter(s) Oral every 4 hours PRN Dyspepsia  haloperidol    Injectable 1 milliGRAM(s) IntraMuscular every 8 hours PRN Agitation  melatonin 3 milliGRAM(s) Oral at bedtime PRN Insomnia  ondansetron Injectable 4 milliGRAM(s) IV Push every 8 hours PRN Nausea and/or Vomiting  QUEtiapine 50 milliGRAM(s) Oral every 6 hours PRN mild to moderate agitation/insomnia      __________________________________________________  REVIEW OF SYSTEMS:    CONSTITUTIONAL: No fever,   EYES: no acute visual disturbances  NECK: No pain or stiffness  RESPIRATORY: No cough; No shortness of breath  CARDIOVASCULAR: No chest pain, no palpitations  GASTROINTESTINAL: No pain. No nausea or vomiting; No diarrhea   NEUROLOGICAL: No headache or numbness, no tremors  MUSCULOSKELETAL: No joint pain, no muscle pain  GENITOURINARY: no dysuria, no frequency, no hesitancy  PSYCHIATRY: no depression , no anxiety  ALL OTHER  ROS negative        Vital Signs Last 24 Hrs  T(C): 36.5 (07 Jun 2024 13:49), Max: 36.7 (07 Jun 2024 05:09)  T(F): 97.7 (07 Jun 2024 13:49), Max: 98.1 (07 Jun 2024 05:09)  HR: 54 (07 Jun 2024 13:49) (54 - 86)  BP: 122/61 (07 Jun 2024 13:49) (108/82 - 158/77)  BP(mean): 78 (07 Jun 2024 05:09) (78 - 78)  RR: 18 (07 Jun 2024 13:49) (18 - 18)  SpO2: 97% (07 Jun 2024 13:49) (95% - 97%)    Parameters below as of 07 Jun 2024 13:49  Patient On (Oxygen Delivery Method): room air        ________________________________________________  PHYSICAL EXAM:  GENERAL: NAD  HEENT: Normocephalic;  conjunctivae and sclerae clear; moist mucous membranes;   NECK : supple  CHEST/LUNG: Clear to ausculitation bilaterally with good air entry   HEART: S1 S2  regular; no murmurs, gallops or rubs  ABDOMEN: Soft, Nontender, Nondistended; Bowel sounds present  EXTREMITIES: no cyanosis; no edema; no calf tenderness  SKIN: warm and dry; no rash  NERVOUS SYSTEM:  Awake and alert; Oriented  to place, person and time ; no new deficits    _________________________________________________  LABS:                        13.8   8.50  )-----------( 228      ( 06 Jun 2024 06:44 )             40.4     06-06    140  |  111<H>  |  17  ----------------------------<  259<H>  4.0   |  20<L>  |  1.13    Ca    8.9      06 Jun 2024 06:44  Phos  2.4     06-06  Mg     2.0     06-06        Urinalysis Basic - ( 06 Jun 2024 06:44 )    Color: x / Appearance: x / SG: x / pH: x  Gluc: 259 mg/dL / Ketone: x  / Bili: x / Urobili: x   Blood: x / Protein: x / Nitrite: x   Leuk Esterase: x / RBC: x / WBC x   Sq Epi: x / Non Sq Epi: x / Bacteria: x      CAPILLARY BLOOD GLUCOSE      POCT Blood Glucose.: 82 mg/dL (07 Jun 2024 11:38)  POCT Blood Glucose.: 74 mg/dL (07 Jun 2024 11:22)  POCT Blood Glucose.: 126 mg/dL (07 Jun 2024 07:46)  POCT Blood Glucose.: 188 mg/dL (06 Jun 2024 21:32)  POCT Blood Glucose.: 243 mg/dL (06 Jun 2024 17:02)        RADIOLOGY & ADDITIONAL TESTS:    Imaging Personally Reviewed:  YES/NO    Consultant(s) Notes Reviewed:   YES/ No    Care Discussed with Consultants :     Plan of care was discussed with patient and /or primary care giver; all questions and concerns were addressed and care was aligned with patient's wishes.     NP Note discussed with  primary attending    Patient is a 74y old  Male who presents with a chief complaint of AMS (07 Jun 2024 10:01)      INTERVAL HPI/OVERNIGHT EVENTS: no new complaints    MEDICATIONS  (STANDING):  amLODIPine   Tablet 5 milliGRAM(s) Oral every 24 hours  atorvastatin 80 milliGRAM(s) Oral at bedtime  carvedilol 12.5 milliGRAM(s) Oral every 12 hours  gabapentin 300 milliGRAM(s) Oral daily  insulin glargine Injectable (LANTUS) 32 Unit(s) SubCutaneous at bedtime  insulin lispro (ADMELOG) corrective regimen sliding scale   SubCutaneous three times a day before meals  insulin lispro (ADMELOG) corrective regimen sliding scale   SubCutaneous at bedtime  insulin lispro Injectable (ADMELOG) 7 Unit(s) SubCutaneous three times a day before meals  latanoprost 0.005% Ophthalmic Solution 1 Drop(s) Both EYES at bedtime  lisinopril 20 milliGRAM(s) Oral daily  mirtazapine 7.5 milliGRAM(s) Oral at bedtime  pantoprazole    Tablet 40 milliGRAM(s) Oral before breakfast  polyethylene glycol 3350 17 Gram(s) Oral daily  QUEtiapine 50 milliGRAM(s) Oral at bedtime  senna 2 Tablet(s) Oral at bedtime    MEDICATIONS  (PRN):  acetaminophen     Tablet .. 650 milliGRAM(s) Oral every 6 hours PRN Temp greater or equal to 38C (100.4F), Mild Pain (1 - 3)  aluminum hydroxide/magnesium hydroxide/simethicone Suspension 30 milliLiter(s) Oral every 4 hours PRN Dyspepsia  haloperidol    Injectable 1 milliGRAM(s) IntraMuscular every 8 hours PRN Agitation  melatonin 3 milliGRAM(s) Oral at bedtime PRN Insomnia  ondansetron Injectable 4 milliGRAM(s) IV Push every 8 hours PRN Nausea and/or Vomiting  QUEtiapine 50 milliGRAM(s) Oral every 6 hours PRN mild to moderate agitation/insomnia      __________________________________________________  REVIEW OF SYSTEMS: Unable to elicit ROS 2/2 impaired cognition    CONSTITUTIONAL: No fever,     Vital Signs Last 24 Hrs  T(C): 36.5 (07 Jun 2024 13:49), Max: 36.7 (07 Jun 2024 05:09)  T(F): 97.7 (07 Jun 2024 13:49), Max: 98.1 (07 Jun 2024 05:09)  HR: 54 (07 Jun 2024 13:49) (54 - 86)  BP: 122/61 (07 Jun 2024 13:49) (108/82 - 158/77)  BP(mean): 78 (07 Jun 2024 05:09) (78 - 78)  RR: 18 (07 Jun 2024 13:49) (18 - 18)  SpO2: 97% (07 Jun 2024 13:49) (95% - 97%)    Parameters below as of 07 Jun 2024 13:49  Patient On (Oxygen Delivery Method): room air        ________________________________________________  PHYSICAL EXAM:  GENERAL: NAD  HEENT: Normocephalic;  conjunctivae and sclerae clear; moist mucous membranes;   NECK : supple  CHEST/LUNG: Clear to auscultation bilaterally with good air entry   HEART: S1 S2  regular; no murmurs, gallops or rubs  ABDOMEN: Soft, Nontender, Nondistended; Bowel sounds present  EXTREMITIES: no cyanosis; no edema; no calf tenderness  SKIN: warm and dry; no rash  NERVOUS SYSTEM:  A&Ox1    _________________________________________________  LABS:                        13.8   8.50  )-----------( 228      ( 06 Jun 2024 06:44 )             40.4     06-06    140  |  111<H>  |  17  ----------------------------<  259<H>  4.0   |  20<L>  |  1.13    Ca    8.9      06 Jun 2024 06:44  Phos  2.4     06-06  Mg     2.0     06-06        Urinalysis Basic - ( 06 Jun 2024 06:44 )    Color: x / Appearance: x / SG: x / pH: x  Gluc: 259 mg/dL / Ketone: x  / Bili: x / Urobili: x   Blood: x / Protein: x / Nitrite: x   Leuk Esterase: x / RBC: x / WBC x   Sq Epi: x / Non Sq Epi: x / Bacteria: x      CAPILLARY BLOOD GLUCOSE      POCT Blood Glucose.: 82 mg/dL (07 Jun 2024 11:38)  POCT Blood Glucose.: 74 mg/dL (07 Jun 2024 11:22)  POCT Blood Glucose.: 126 mg/dL (07 Jun 2024 07:46)  POCT Blood Glucose.: 188 mg/dL (06 Jun 2024 21:32)  POCT Blood Glucose.: 243 mg/dL (06 Jun 2024 17:02)        RADIOLOGY & ADDITIONAL TESTS:    Imaging Personally Reviewed:  YES/NO    Consultant(s) Notes Reviewed:   YES/ No    Care Discussed with Consultants :     Plan of care was discussed with patient and /or primary care giver; all questions and concerns were addressed and care was aligned with patient's wishes.

## 2024-06-08 LAB
GLUCOSE BLDC GLUCOMTR-MCNC: 110 MG/DL — HIGH (ref 70–99)
GLUCOSE BLDC GLUCOMTR-MCNC: 161 MG/DL — HIGH (ref 70–99)
GLUCOSE BLDC GLUCOMTR-MCNC: 168 MG/DL — HIGH (ref 70–99)
GLUCOSE BLDC GLUCOMTR-MCNC: 212 MG/DL — HIGH (ref 70–99)
PHOSPHATE SERPL-MCNC: 4.1 MG/DL — SIGNIFICANT CHANGE UP (ref 2.5–4.5)

## 2024-06-08 RX ADMIN — PANTOPRAZOLE SODIUM 40 MILLIGRAM(S): 20 TABLET, DELAYED RELEASE ORAL at 05:15

## 2024-06-08 RX ADMIN — Medication 0: at 21:22

## 2024-06-08 RX ADMIN — QUETIAPINE FUMARATE 50 MILLIGRAM(S): 200 TABLET, FILM COATED ORAL at 21:21

## 2024-06-08 RX ADMIN — POLYETHYLENE GLYCOL 3350 17 GRAM(S): 17 POWDER, FOR SOLUTION ORAL at 11:59

## 2024-06-08 RX ADMIN — Medication 5 UNIT(S): at 17:17

## 2024-06-08 RX ADMIN — INSULIN GLARGINE 32 UNIT(S): 100 INJECTION, SOLUTION SUBCUTANEOUS at 21:22

## 2024-06-08 RX ADMIN — ATORVASTATIN CALCIUM 80 MILLIGRAM(S): 80 TABLET, FILM COATED ORAL at 21:21

## 2024-06-08 RX ADMIN — LATANOPROST 1 DROP(S): 0.05 SOLUTION/ DROPS OPHTHALMIC; TOPICAL at 21:21

## 2024-06-08 RX ADMIN — AMLODIPINE BESYLATE 5 MILLIGRAM(S): 2.5 TABLET ORAL at 17:16

## 2024-06-08 RX ADMIN — Medication 2: at 17:16

## 2024-06-08 RX ADMIN — GABAPENTIN 300 MILLIGRAM(S): 400 CAPSULE ORAL at 11:58

## 2024-06-08 RX ADMIN — ENOXAPARIN SODIUM 40 MILLIGRAM(S): 100 INJECTION SUBCUTANEOUS at 17:16

## 2024-06-08 RX ADMIN — LISINOPRIL 20 MILLIGRAM(S): 2.5 TABLET ORAL at 05:15

## 2024-06-08 RX ADMIN — CARVEDILOL PHOSPHATE 12.5 MILLIGRAM(S): 80 CAPSULE, EXTENDED RELEASE ORAL at 17:16

## 2024-06-08 RX ADMIN — Medication 5 UNIT(S): at 08:24

## 2024-06-08 RX ADMIN — Medication 1: at 11:58

## 2024-06-08 RX ADMIN — CARVEDILOL PHOSPHATE 12.5 MILLIGRAM(S): 80 CAPSULE, EXTENDED RELEASE ORAL at 05:16

## 2024-06-08 RX ADMIN — MIRTAZAPINE 7.5 MILLIGRAM(S): 45 TABLET, ORALLY DISINTEGRATING ORAL at 21:22

## 2024-06-08 RX ADMIN — Medication 5 UNIT(S): at 11:58

## 2024-06-08 NOTE — PROGRESS NOTE ADULT - SUBJECTIVE AND OBJECTIVE BOX
Patient is a 74y old  Male who presents with a chief complaint of AMS (07 Jun 2024 16:06)    PATIENT IS SEEN AND EXAMINED IN MEDICAL FLOOR.  JETT [    ]    SERGIO [   ]      GT [   ]    ALLERGIES:  No Known Allergies      Daily     Daily     VITALS:    Vital Signs Last 24 Hrs  T(C): 36.8 (08 Jun 2024 04:58), Max: 36.8 (07 Jun 2024 21:05)  T(F): 98.2 (08 Jun 2024 04:58), Max: 98.3 (07 Jun 2024 21:05)  HR: 60 (08 Jun 2024 04:58) (54 - 60)  BP: 135/78 (08 Jun 2024 04:58) (121/69 - 144/75)  BP(mean): --  RR: 18 (08 Jun 2024 04:58) (17 - 18)  SpO2: 97% (08 Jun 2024 04:58) (95% - 97%)    Parameters below as of 08 Jun 2024 04:58  Patient On (Oxygen Delivery Method): room air        LABS:          Phos  4.1     06-08      CAPILLARY BLOOD GLUCOSE      POCT Blood Glucose.: 110 mg/dL (08 Jun 2024 07:38)  POCT Blood Glucose.: 143 mg/dL (07 Jun 2024 21:29)  POCT Blood Glucose.: 176 mg/dL (07 Jun 2024 16:53)  POCT Blood Glucose.: 82 mg/dL (07 Jun 2024 11:38)  POCT Blood Glucose.: 74 mg/dL (07 Jun 2024 11:22)          Creatinine Trend: 1.13<--, 1.46<--, 1.13<--, 1.51<--, 1.18<--, 1.56<--  I&O's Summary          .CSF CSF  06-05 @ 14:42   No growth  --    No polymorphonuclear cells seen  No organisms seen  by cytocentrifuge          MEDICATIONS:    MEDICATIONS  (STANDING):  amLODIPine   Tablet 5 milliGRAM(s) Oral every 24 hours  atorvastatin 80 milliGRAM(s) Oral at bedtime  carvedilol 12.5 milliGRAM(s) Oral every 12 hours  enoxaparin Injectable 40 milliGRAM(s) SubCutaneous every 24 hours  gabapentin 300 milliGRAM(s) Oral daily  insulin glargine Injectable (LANTUS) 32 Unit(s) SubCutaneous at bedtime  insulin lispro (ADMELOG) corrective regimen sliding scale   SubCutaneous three times a day before meals  insulin lispro (ADMELOG) corrective regimen sliding scale   SubCutaneous at bedtime  insulin lispro Injectable (ADMELOG) 5 Unit(s) SubCutaneous three times a day before meals  latanoprost 0.005% Ophthalmic Solution 1 Drop(s) Both EYES at bedtime  lisinopril 20 milliGRAM(s) Oral daily  mirtazapine 7.5 milliGRAM(s) Oral at bedtime  pantoprazole    Tablet 40 milliGRAM(s) Oral before breakfast  polyethylene glycol 3350 17 Gram(s) Oral daily  QUEtiapine 50 milliGRAM(s) Oral at bedtime  senna 2 Tablet(s) Oral at bedtime      MEDICATIONS  (PRN):  acetaminophen     Tablet .. 650 milliGRAM(s) Oral every 6 hours PRN Temp greater or equal to 38C (100.4F), Mild Pain (1 - 3)  aluminum hydroxide/magnesium hydroxide/simethicone Suspension 30 milliLiter(s) Oral every 4 hours PRN Dyspepsia  haloperidol    Injectable 1 milliGRAM(s) IntraMuscular every 8 hours PRN Agitation  melatonin 3 milliGRAM(s) Oral at bedtime PRN Insomnia  ondansetron Injectable 4 milliGRAM(s) IV Push every 8 hours PRN Nausea and/or Vomiting  QUEtiapine 50 milliGRAM(s) Oral every 6 hours PRN mild to moderate agitation/insomnia      REVIEW OF SYSTEMS:                           ALL ROS DONE [ X   ]    CONSTITUTIONAL:  LETHARGIC [   ], FEVER [   ], UNRESPONSIVE [   ]  CVS:  CP  [   ], SOB, [   ], PALPITATIONS [   ], DIZZYNESS [   ]  RS: COUGH [   ], SPUTUM [   ]  GI: ABDOMINAL PAIN [   ], NAUSEA [   ], VOMITINGS [   ], DIARRHEA [   ], CONSTIPATION [   ]  :  DYSURIA [   ], NOCTURIA [   ], INCREASED FREQUENCY [   ], DRIBLING [   ],  SKELETAL: PAINFUL JOINTS [   ], SWOLLEN JOINTS [   ], NECK ACHE [   ], LOW BACK ACHE [   ],  SKIN : ULCERS [   ], RASH [   ], ITCHING [   ]  CNS: HEAD ACHE [   ], DOUBLE VISION [   ], BLURRED VISION [   ], AMS / CONFUSION [   ], SEIZURES [   ], WEAKNESS [   ],TINGLING / NUMBNESS [   ]        PHYSICAL EXAMINATION:    GENERAL APPEARANCE: NO DISTRESS  HEENT:  NO PALLOR, NO  JVD,  NO   NODES, NECK SUPPLE  CVS: S1 +, S2 +,   RS: AEEB,  OCCASIONAL  RALES +,   NO RONCHI  ABD: SOFT, NT, NO, BS +  EXT: NO PE  SKIN: WARM,   SKELETAL:  ROM ACCEPTABLE  CNS:  AAO X 1-2        RADIOLOGY :    RADIOLOGY AND READINGS REVIEWED        ASSESSMENT :     Altered mental status        PLAN:  HPI:  74M PMH dementia, DM, and HTN presenting to the ED with AMS. Pt seen at bedside AAOX2, states he does not know why he was brought to the hospital, but was complaining of bilateral knee pain. Daughter called for collateral information, reports that for the past week, her father has become more confused and aggressive at home, pulling out electrical wires at home and trying to flush his clothes down the toilet. He has a HHA during the day for 12 hours x 5 days and his granddaughter stays the rest of the night with him, however, he has been becoming more aggressive and cursing out the granddaughter. He has been having urinary and bowel incontinence. He is blind in both eyes and ambulates with a walker however has been losing his balance lately with no falls. He denies any fevers, chills, CP, SOB, N/V/D, abdominal pain, dysuria, numbness/tingling in extremities.      (30 May 2024 10:14)    # [6/7] CASE D/W DAUGHTER CARLOS AT BEDSIDE. ALL QUESTIONS ANSWERED.       # [6/3] CASE DW PATIENT'S DAUGHTER CARLOS [CORRECTION FOR VELMA] ROSARIO @ [phone] . PER DAUGHTER PATIENT HAS HAD WORSENING DEMENTIA AND HAS NOTED MOOD CHANGES. SHE ALSO CONVEYS HE IS NONCOMPLIANT WITH MEDICATION DESPITE SUPPORTIVE MEASURES. SHE CONVEYS THAT FAMILY IS AGREEABLE FOR MUSHTAQ.   - SHE AND FAMILY ARE DISCUSSING WHETHER TO PURSUE LP AT THIS TIME OR NOT. NEUROLOGY TEAM DISCUSSED LP AND FURTHER WORKUP WITH FAMILY - THEY ARE DISCUSSING AS A FAMILY REGARDING WHETHER TO PURSUE FURTHER WORKUP AT THIS TIME. THEY UNDERSTAND RISKS AND BENEFITS OF FURTHER EVALUATION. THEY ALSO UNDERSTAND RISKS OF DEFERRING EVALUATION. THEY WILL DISCUSS AS A FAMILY AND DISCUSS WITH NEUROLOGY TO LET THE TEAM KNOW THEIR WISHES.   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 VENTRICULOMEGALY vs. NPH  - NOTED CT HEAD  - NOTED UA NEGATIVE  - NOTED UTOX NEGATIVE  - MRI BRAIN W/ CSF FLOW REVIEWED  - PSYCHIATRY CONSULT  - NEUROLOGY CONSULT    - ON REMERON  - ON SEROQUEL [UPTITRATING] PER PSYCH    - NEUROLOGY TEAM DISCUSSED LP AND FURTHER WORKUP WITH FAMILY    - S/P LUMBAR PUNCTURE [6/5] - OPENING PRESSURE 17MM , F/U ANALYSIS    # B/L KNEE PAIN -  LIKELY S/T OA - IMPROVED  # IMPAIRED GAIT DUE TO GENERALIZED MUSCLE WEAKNESS, POLYARTHRITIS, DIABETIC PERIPHERAL NEUROPATHY  - PRN PAIN CONTROL      # DIABETIC NEPHROPATHY  # VITA ON CKD - IMPROVED  - MONITOR CR  - AVOID NEPHROTOXIC AGENTS      # DM  - HBA1C - 11.7   - SSI + FS  - ENDOCRINOLOGY CONSULT    # HTN    # VISUALLY IMPAIRED, LIKELY DIABETIC RETINOPATHY     # GI AND DVT PPX   Patient is a 74y old  Male who presents with a chief complaint of AMS (07 Jun 2024 16:06)    PATIENT IS SEEN AND EXAMINED IN MEDICAL FLOOR.      ALLERGIES:  No Known Allergies      VITALS:    Vital Signs Last 24 Hrs  T(C): 36.8 (08 Jun 2024 04:58), Max: 36.8 (07 Jun 2024 21:05)  T(F): 98.2 (08 Jun 2024 04:58), Max: 98.3 (07 Jun 2024 21:05)  HR: 60 (08 Jun 2024 04:58) (54 - 60)  BP: 135/78 (08 Jun 2024 04:58) (121/69 - 144/75)  BP(mean): --  RR: 18 (08 Jun 2024 04:58) (17 - 18)  SpO2: 97% (08 Jun 2024 04:58) (95% - 97%)    Parameters below as of 08 Jun 2024 04:58  Patient On (Oxygen Delivery Method): room air        LABS:          Phos  4.1     06-08      CAPILLARY BLOOD GLUCOSE      POCT Blood Glucose.: 110 mg/dL (08 Jun 2024 07:38)  POCT Blood Glucose.: 143 mg/dL (07 Jun 2024 21:29)  POCT Blood Glucose.: 176 mg/dL (07 Jun 2024 16:53)  POCT Blood Glucose.: 82 mg/dL (07 Jun 2024 11:38)  POCT Blood Glucose.: 74 mg/dL (07 Jun 2024 11:22)          Creatinine Trend: 1.13<--, 1.46<--, 1.13<--, 1.51<--, 1.18<--, 1.56<--  I&O's Summary          .CSF CSF  06-05 @ 14:42   No growth  --    No polymorphonuclear cells seen  No organisms seen  by cytocentrifuge          MEDICATIONS:    MEDICATIONS  (STANDING):  amLODIPine   Tablet 5 milliGRAM(s) Oral every 24 hours  atorvastatin 80 milliGRAM(s) Oral at bedtime  carvedilol 12.5 milliGRAM(s) Oral every 12 hours  enoxaparin Injectable 40 milliGRAM(s) SubCutaneous every 24 hours  gabapentin 300 milliGRAM(s) Oral daily  insulin glargine Injectable (LANTUS) 32 Unit(s) SubCutaneous at bedtime  insulin lispro (ADMELOG) corrective regimen sliding scale   SubCutaneous three times a day before meals  insulin lispro (ADMELOG) corrective regimen sliding scale   SubCutaneous at bedtime  insulin lispro Injectable (ADMELOG) 5 Unit(s) SubCutaneous three times a day before meals  latanoprost 0.005% Ophthalmic Solution 1 Drop(s) Both EYES at bedtime  lisinopril 20 milliGRAM(s) Oral daily  mirtazapine 7.5 milliGRAM(s) Oral at bedtime  pantoprazole    Tablet 40 milliGRAM(s) Oral before breakfast  polyethylene glycol 3350 17 Gram(s) Oral daily  QUEtiapine 50 milliGRAM(s) Oral at bedtime  senna 2 Tablet(s) Oral at bedtime      MEDICATIONS  (PRN):  acetaminophen     Tablet .. 650 milliGRAM(s) Oral every 6 hours PRN Temp greater or equal to 38C (100.4F), Mild Pain (1 - 3)  aluminum hydroxide/magnesium hydroxide/simethicone Suspension 30 milliLiter(s) Oral every 4 hours PRN Dyspepsia  haloperidol    Injectable 1 milliGRAM(s) IntraMuscular every 8 hours PRN Agitation  melatonin 3 milliGRAM(s) Oral at bedtime PRN Insomnia  ondansetron Injectable 4 milliGRAM(s) IV Push every 8 hours PRN Nausea and/or Vomiting  QUEtiapine 50 milliGRAM(s) Oral every 6 hours PRN mild to moderate agitation/insomnia      REVIEW OF SYSTEMS:                           ALL ROS DONE [ X   ]    CONSTITUTIONAL:  LETHARGIC [   ], FEVER [   ], UNRESPONSIVE [   ]  CVS:  CP  [   ], SOB, [   ], PALPITATIONS [   ], DIZZYNESS [   ]  RS: COUGH [   ], SPUTUM [   ]  GI: ABDOMINAL PAIN [   ], NAUSEA [   ], VOMITINGS [   ], DIARRHEA [   ], CONSTIPATION [   ]  :  DYSURIA [   ], NOCTURIA [   ], INCREASED FREQUENCY [   ], DRIBLING [   ],  SKELETAL: PAINFUL JOINTS [   ], SWOLLEN JOINTS [   ], NECK ACHE [   ], LOW BACK ACHE [   ],  SKIN : ULCERS [   ], RASH [   ], ITCHING [   ]  CNS: HEAD ACHE [   ], DOUBLE VISION [   ], BLURRED VISION [   ], AMS / CONFUSION [   ], SEIZURES [   ], WEAKNESS [   ],TINGLING / NUMBNESS [   ]        PHYSICAL EXAMINATION:    GENERAL APPEARANCE: NO DISTRESS  HEENT:  NO PALLOR, NO  JVD,  NO   NODES, NECK SUPPLE  CVS: S1 +, S2 +,   RS: AEEB,  OCCASIONAL  RALES +,   NO RONCHI  ABD: SOFT, NT, NO, BS +  EXT: NO PE  SKIN: WARM,   SKELETAL:  ROM ACCEPTABLE  CNS:  AAO X 1-2        RADIOLOGY :    RADIOLOGY AND READINGS REVIEWED        ASSESSMENT :     Altered mental status        PLAN:  HPI:  74M PMH dementia, DM, and HTN presenting to the ED with AMS. Pt seen at bedside AAOX2, states he does not know why he was brought to the hospital, but was complaining of bilateral knee pain. Daughter called for collateral information, reports that for the past week, her father has become more confused and aggressive at home, pulling out electrical wires at home and trying to flush his clothes down the toilet. He has a HHA during the day for 12 hours x 5 days and his granddaughter stays the rest of the night with him, however, he has been becoming more aggressive and cursing out the granddaughter. He has been having urinary and bowel incontinence. He is blind in both eyes and ambulates with a walker however has been losing his balance lately with no falls. He denies any fevers, chills, CP, SOB, N/V/D, abdominal pain, dysuria, numbness/tingling in extremities.      (30 May 2024 10:14)    # [6/8] CASE D/W DAUGHTER CARLOS AT BEDSIDE. ALL QUESTIONS ANSWERED.       # [6/3] CASE DW PATIENT'S DAUGHTER CARLOS [CORRECTION FOR VELMA] ROSARIO @ [phone] . PER DAUGHTER PATIENT HAS HAD WORSENING DEMENTIA AND HAS NOTED MOOD CHANGES. SHE ALSO CONVEYS HE IS NONCOMPLIANT WITH MEDICATION DESPITE SUPPORTIVE MEASURES. SHE CONVEYS THAT FAMILY IS AGREEABLE FOR MUSHTAQ.   - SHE AND FAMILY ARE DISCUSSING WHETHER TO PURSUE LP AT THIS TIME OR NOT. NEUROLOGY TEAM DISCUSSED LP AND FURTHER WORKUP WITH FAMILY - THEY ARE DISCUSSING AS A FAMILY REGARDING WHETHER TO PURSUE FURTHER WORKUP AT THIS TIME. THEY UNDERSTAND RISKS AND BENEFITS OF FURTHER EVALUATION. THEY ALSO UNDERSTAND RISKS OF DEFERRING EVALUATION. THEY WILL DISCUSS AS A FAMILY AND DISCUSS WITH NEUROLOGY TO LET THE TEAM KNOW THEIR WISHES.   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 VENTRICULOMEGALY vs. NPH  - NOTED CT HEAD  - NOTED UA NEGATIVE  - NOTED UTOX NEGATIVE  - MRI BRAIN W/ CSF FLOW REVIEWED  - PSYCHIATRY CONSULT  - NEUROLOGY CONSULT    - ON REMERON  - ON SEROQUEL [UPTITRATING] PER PSYCH    - NEUROLOGY TEAM DISCUSSED LP AND FURTHER WORKUP WITH FAMILY    - S/P LUMBAR PUNCTURE [6/5] - OPENING PRESSURE 17MM , F/U ANALYSIS      # B/L KNEE PAIN -  LIKELY S/T OA - IMPROVED  # IMPAIRED GAIT DUE TO GENERALIZED MUSCLE WEAKNESS, POLYARTHRITIS, DIABETIC PERIPHERAL NEUROPATHY  - PRN PAIN CONTROL      # DIABETIC NEPHROPATHY  # VITA ON CKD - IMPROVED  - MONITOR CR  - AVOID NEPHROTOXIC AGENTS      # DM  - HBA1C - 11.7   - SSI + FS  - ENDOCRINOLOGY CONSULT    # HTN    # VISUALLY IMPAIRED, LIKELY DIABETIC RETINOPATHY     # GI AND DVT PPX

## 2024-06-09 LAB
ANION GAP SERPL CALC-SCNC: 6 MMOL/L — SIGNIFICANT CHANGE UP (ref 5–17)
BUN SERPL-MCNC: 13 MG/DL — SIGNIFICANT CHANGE UP (ref 7–18)
CALCIUM SERPL-MCNC: 9 MG/DL — SIGNIFICANT CHANGE UP (ref 8.4–10.5)
CHLORIDE SERPL-SCNC: 110 MMOL/L — HIGH (ref 96–108)
CO2 SERPL-SCNC: 24 MMOL/L — SIGNIFICANT CHANGE UP (ref 22–31)
CREAT SERPL-MCNC: 1.16 MG/DL — SIGNIFICANT CHANGE UP (ref 0.5–1.3)
EGFR: 66 ML/MIN/1.73M2 — SIGNIFICANT CHANGE UP
GLUCOSE BLDC GLUCOMTR-MCNC: 185 MG/DL — HIGH (ref 70–99)
GLUCOSE BLDC GLUCOMTR-MCNC: 292 MG/DL — HIGH (ref 70–99)
GLUCOSE BLDC GLUCOMTR-MCNC: 331 MG/DL — HIGH (ref 70–99)
GLUCOSE BLDC GLUCOMTR-MCNC: 347 MG/DL — HIGH (ref 70–99)
GLUCOSE SERPL-MCNC: 186 MG/DL — HIGH (ref 70–99)
HCT VFR BLD CALC: 41.9 % — SIGNIFICANT CHANGE UP (ref 39–50)
HGB BLD-MCNC: 13.7 G/DL — SIGNIFICANT CHANGE UP (ref 13–17)
MAGNESIUM SERPL-MCNC: 2 MG/DL — SIGNIFICANT CHANGE UP (ref 1.6–2.6)
MCHC RBC-ENTMCNC: 28.7 PG — SIGNIFICANT CHANGE UP (ref 27–34)
MCHC RBC-ENTMCNC: 32.7 GM/DL — SIGNIFICANT CHANGE UP (ref 32–36)
MCV RBC AUTO: 87.8 FL — SIGNIFICANT CHANGE UP (ref 80–100)
NRBC # BLD: 0 /100 WBCS — SIGNIFICANT CHANGE UP (ref 0–0)
PHOSPHATE SERPL-MCNC: 3.1 MG/DL — SIGNIFICANT CHANGE UP (ref 2.5–4.5)
PLATELET # BLD AUTO: 257 K/UL — SIGNIFICANT CHANGE UP (ref 150–400)
POTASSIUM SERPL-MCNC: 4 MMOL/L — SIGNIFICANT CHANGE UP (ref 3.5–5.3)
POTASSIUM SERPL-SCNC: 4 MMOL/L — SIGNIFICANT CHANGE UP (ref 3.5–5.3)
RBC # BLD: 4.77 M/UL — SIGNIFICANT CHANGE UP (ref 4.2–5.8)
RBC # FLD: 14.5 % — SIGNIFICANT CHANGE UP (ref 10.3–14.5)
SODIUM SERPL-SCNC: 140 MMOL/L — SIGNIFICANT CHANGE UP (ref 135–145)
WBC # BLD: 8.09 K/UL — SIGNIFICANT CHANGE UP (ref 3.8–10.5)
WBC # FLD AUTO: 8.09 K/UL — SIGNIFICANT CHANGE UP (ref 3.8–10.5)

## 2024-06-09 RX ADMIN — POLYETHYLENE GLYCOL 3350 17 GRAM(S): 17 POWDER, FOR SOLUTION ORAL at 11:51

## 2024-06-09 RX ADMIN — LISINOPRIL 20 MILLIGRAM(S): 2.5 TABLET ORAL at 05:35

## 2024-06-09 RX ADMIN — INSULIN GLARGINE 32 UNIT(S): 100 INJECTION, SOLUTION SUBCUTANEOUS at 21:18

## 2024-06-09 RX ADMIN — CARVEDILOL PHOSPHATE 12.5 MILLIGRAM(S): 80 CAPSULE, EXTENDED RELEASE ORAL at 05:35

## 2024-06-09 RX ADMIN — ATORVASTATIN CALCIUM 80 MILLIGRAM(S): 80 TABLET, FILM COATED ORAL at 21:18

## 2024-06-09 RX ADMIN — ENOXAPARIN SODIUM 40 MILLIGRAM(S): 100 INJECTION SUBCUTANEOUS at 17:09

## 2024-06-09 RX ADMIN — QUETIAPINE FUMARATE 50 MILLIGRAM(S): 200 TABLET, FILM COATED ORAL at 21:18

## 2024-06-09 RX ADMIN — Medication 1: at 07:59

## 2024-06-09 RX ADMIN — Medication 3 MILLIGRAM(S): at 23:31

## 2024-06-09 RX ADMIN — Medication 4: at 11:50

## 2024-06-09 RX ADMIN — CARVEDILOL PHOSPHATE 12.5 MILLIGRAM(S): 80 CAPSULE, EXTENDED RELEASE ORAL at 17:09

## 2024-06-09 RX ADMIN — LATANOPROST 1 DROP(S): 0.05 SOLUTION/ DROPS OPHTHALMIC; TOPICAL at 21:17

## 2024-06-09 RX ADMIN — SENNA PLUS 2 TABLET(S): 8.6 TABLET ORAL at 21:18

## 2024-06-09 RX ADMIN — Medication 5 UNIT(S): at 11:51

## 2024-06-09 RX ADMIN — Medication 2: at 21:19

## 2024-06-09 RX ADMIN — MIRTAZAPINE 7.5 MILLIGRAM(S): 45 TABLET, ORALLY DISINTEGRATING ORAL at 21:18

## 2024-06-09 RX ADMIN — Medication 5 UNIT(S): at 17:08

## 2024-06-09 RX ADMIN — GABAPENTIN 300 MILLIGRAM(S): 400 CAPSULE ORAL at 11:51

## 2024-06-09 RX ADMIN — Medication 3: at 17:08

## 2024-06-09 RX ADMIN — PANTOPRAZOLE SODIUM 40 MILLIGRAM(S): 20 TABLET, DELAYED RELEASE ORAL at 05:36

## 2024-06-09 RX ADMIN — Medication 5 UNIT(S): at 07:59

## 2024-06-09 RX ADMIN — AMLODIPINE BESYLATE 5 MILLIGRAM(S): 2.5 TABLET ORAL at 17:09

## 2024-06-09 NOTE — PROGRESS NOTE ADULT - SUBJECTIVE AND OBJECTIVE BOX
Interval Events:  pt in nad    Allergies    No Known Allergies    Intolerances      Endocrine/Metabolic Medications:  atorvastatin 80 milliGRAM(s) Oral at bedtime  insulin glargine Injectable (LANTUS) 32 Unit(s) SubCutaneous at bedtime  insulin lispro (ADMELOG) corrective regimen sliding scale   SubCutaneous three times a day before meals  insulin lispro (ADMELOG) corrective regimen sliding scale   SubCutaneous at bedtime  insulin lispro Injectable (ADMELOG) 5 Unit(s) SubCutaneous three times a day before meals      Vital Signs Last 24 Hrs  T(C): 36.3 (09 Jun 2024 05:32), Max: 36.4 (08 Jun 2024 13:00)  T(F): 97.3 (09 Jun 2024 05:32), Max: 97.6 (08 Jun 2024 13:00)  HR: 65 (09 Jun 2024 05:32) (62 - 67)  BP: 165/73 (09 Jun 2024 05:32) (145/76 - 165/73)  BP(mean): --  RR: 18 (09 Jun 2024 05:32) (18 - 18)  SpO2: 95% (09 Jun 2024 05:32) (95% - 97%)    Parameters below as of 09 Jun 2024 05:32  Patient On (Oxygen Delivery Method): room air          PHYSICAL EXAM  All physical exam findings normal, except those marked:  General:	Alert, active, cooperative, NAD, well hydrated  .		[] Abnormal:  Neck		Normal: supple, no cervical adenopathy, no palpable thyroid  .		[] Abnormal:  Cardiovascular	Normal: regular rate, normal S1, S2, no murmurs  .		[] Abnormal:  Respiratory	Normal: no chest wall deformity, normal respiratory pattern, CTA B/L  .		[] Abnormal:  Abdominal	Normal: soft, ND, NT, bowel sounds present, no masses, no organomegaly  .		[] Abnormal:  		Normal normal genitalia, testes descended, circumcised/uncircumcised  .		Ahmet stage:			Breast ahmet:  .		Menstrual history:  .		[] Abnormal:  Extremities	Normal: FROM x4  .		[] Abnormal:  Skin		Normal: intact and not indurated, no rash, no acanthosis nigricans  .		[] Abnormal:  Neurologic	Normal: grossly intact  .		[] Abnormal:    LABS                        13.7   8.09  )-----------( 257      ( 09 Jun 2024 06:00 )             41.9                               140    |  110    |  13                  Calcium: 9.0   / iCa: x      (06-09 @ 06:00)    ----------------------------<  186       Magnesium: 2.0                              4.0     |  24     |  1.16             Phosphorous: 3.1        CAPILLARY BLOOD GLUCOSE      POCT Blood Glucose.: 185 mg/dL (09 Jun 2024 07:50)  POCT Blood Glucose.: 161 mg/dL (08 Jun 2024 21:08)  POCT Blood Glucose.: 212 mg/dL (08 Jun 2024 16:46)  POCT Blood Glucose.: 168 mg/dL (08 Jun 2024 11:14)        Assesment/plan    74M PMH dementia, DM, and HTN presenting to the ED with AMS. Found to have uncont dm. Pt admits to taking meds and insulin as out pt- given by family.            Problem/Recommendation - 1:  ·  Problem: DM (diabetes mellitus).   ·  Recommendation: poorly controlled as out pt  a1c- 11.7  mild postmeal hypo   cont lantus 32 units  cont admelog to 5 ac tid- hold if poor po intake  fsg ac and hs  d/w family- daughter states pt refuses insulin as out pt most times      Problem/Recommendation - 2:  ·  Problem: Acute encephalopathy.   ·  Recommendation: tx per prim team.

## 2024-06-09 NOTE — PROGRESS NOTE ADULT - SUBJECTIVE AND OBJECTIVE BOX
Patient is a 74y old  Male who presents with a chief complaint of AMS (09 Jun 2024 10:43)    PATIENT IS SEEN AND EXAMINED IN MEDICAL FLOOR.  JETT [    ]    SERGIO [   ]      GT [   ]    ALLERGIES:  No Known Allergies      Daily     Daily     VITALS:    Vital Signs Last 24 Hrs  T(C): 36.3 (09 Jun 2024 05:32), Max: 36.4 (08 Jun 2024 13:00)  T(F): 97.3 (09 Jun 2024 05:32), Max: 97.6 (08 Jun 2024 13:00)  HR: 65 (09 Jun 2024 05:32) (62 - 67)  BP: 165/73 (09 Jun 2024 05:32) (145/76 - 165/73)  BP(mean): --  RR: 18 (09 Jun 2024 05:32) (18 - 18)  SpO2: 95% (09 Jun 2024 05:32) (95% - 97%)    Parameters below as of 09 Jun 2024 05:32  Patient On (Oxygen Delivery Method): room air        LABS:    CBC Full  -  ( 09 Jun 2024 06:00 )  WBC Count : 8.09 K/uL  RBC Count : 4.77 M/uL  Hemoglobin : 13.7 g/dL  Hematocrit : 41.9 %  Platelet Count - Automated : 257 K/uL  Mean Cell Volume : 87.8 fl  Mean Cell Hemoglobin : 28.7 pg  Mean Cell Hemoglobin Concentration : 32.7 gm/dL  Auto Neutrophil # : x  Auto Lymphocyte # : x  Auto Monocyte # : x  Auto Eosinophil # : x  Auto Basophil # : x  Auto Neutrophil % : x  Auto Lymphocyte % : x  Auto Monocyte % : x  Auto Eosinophil % : x  Auto Basophil % : x      06-09    140  |  110<H>  |  13  ----------------------------<  186<H>  4.0   |  24  |  1.16    Ca    9.0      09 Jun 2024 06:00  Phos  3.1     06-09  Mg     2.0     06-09      CAPILLARY BLOOD GLUCOSE      POCT Blood Glucose.: 347 mg/dL (09 Jun 2024 11:34)  POCT Blood Glucose.: 185 mg/dL (09 Jun 2024 07:50)  POCT Blood Glucose.: 161 mg/dL (08 Jun 2024 21:08)  POCT Blood Glucose.: 212 mg/dL (08 Jun 2024 16:46)          Creatinine Trend: 1.16<--, 1.13<--, 1.46<--, 1.13<--, 1.51<--, 1.18<--  I&O's Summary          .CSF CSF  06-05 @ 14:42   No growth  --    No polymorphonuclear cells seen  No organisms seen  by cytocentrifuge          MEDICATIONS:    MEDICATIONS  (STANDING):  amLODIPine   Tablet 5 milliGRAM(s) Oral every 24 hours  atorvastatin 80 milliGRAM(s) Oral at bedtime  carvedilol 12.5 milliGRAM(s) Oral every 12 hours  enoxaparin Injectable 40 milliGRAM(s) SubCutaneous every 24 hours  gabapentin 300 milliGRAM(s) Oral daily  insulin glargine Injectable (LANTUS) 32 Unit(s) SubCutaneous at bedtime  insulin lispro (ADMELOG) corrective regimen sliding scale   SubCutaneous three times a day before meals  insulin lispro (ADMELOG) corrective regimen sliding scale   SubCutaneous at bedtime  insulin lispro Injectable (ADMELOG) 5 Unit(s) SubCutaneous three times a day before meals  latanoprost 0.005% Ophthalmic Solution 1 Drop(s) Both EYES at bedtime  lisinopril 20 milliGRAM(s) Oral daily  mirtazapine 7.5 milliGRAM(s) Oral at bedtime  pantoprazole    Tablet 40 milliGRAM(s) Oral before breakfast  polyethylene glycol 3350 17 Gram(s) Oral daily  QUEtiapine 50 milliGRAM(s) Oral at bedtime  senna 2 Tablet(s) Oral at bedtime      MEDICATIONS  (PRN):  acetaminophen     Tablet .. 650 milliGRAM(s) Oral every 6 hours PRN Temp greater or equal to 38C (100.4F), Mild Pain (1 - 3)  aluminum hydroxide/magnesium hydroxide/simethicone Suspension 30 milliLiter(s) Oral every 4 hours PRN Dyspepsia  haloperidol    Injectable 1 milliGRAM(s) IntraMuscular every 8 hours PRN Agitation  melatonin 3 milliGRAM(s) Oral at bedtime PRN Insomnia  ondansetron Injectable 4 milliGRAM(s) IV Push every 8 hours PRN Nausea and/or Vomiting  QUEtiapine 50 milliGRAM(s) Oral every 6 hours PRN mild to moderate agitation/insomnia      REVIEW OF SYSTEMS:                           ALL ROS DONE [ X   ]    CONSTITUTIONAL:  LETHARGIC [   ], FEVER [   ], UNRESPONSIVE [   ]  CVS:  CP  [   ], SOB, [   ], PALPITATIONS [   ], DIZZYNESS [   ]  RS: COUGH [   ], SPUTUM [   ]  GI: ABDOMINAL PAIN [   ], NAUSEA [   ], VOMITINGS [   ], DIARRHEA [   ], CONSTIPATION [   ]  :  DYSURIA [   ], NOCTURIA [   ], INCREASED FREQUENCY [   ], DRIBLING [   ],  SKELETAL: PAINFUL JOINTS [   ], SWOLLEN JOINTS [   ], NECK ACHE [   ], LOW BACK ACHE [   ],  SKIN : ULCERS [   ], RASH [   ], ITCHING [   ]  CNS: HEAD ACHE [   ], DOUBLE VISION [   ], BLURRED VISION [   ], AMS / CONFUSION [   ], SEIZURES [   ], WEAKNESS [   ],TINGLING / NUMBNESS [   ]    PHYSICAL EXAMINATION:    GENERAL APPEARANCE: NO DISTRESS  HEENT:  NO PALLOR, NO  JVD,  NO   NODES, NECK SUPPLE  CVS: S1 +, S2 +,   RS: AEEB,  OCCASIONAL  RALES +,   NO RONCHI  ABD: SOFT, NT, NO, BS +  EXT: NO PE  SKIN: WARM,   SKELETAL:  ROM ACCEPTABLE  CNS:  AAO X 1-2        RADIOLOGY :    RADIOLOGY AND READINGS REVIEWED        ASSESSMENT :     Altered mental status        PLAN:  HPI:  74M PMH dementia, DM, and HTN presenting to the ED with AMS. Pt seen at bedside AAOX2, states he does not know why he was brought to the hospital, but was complaining of bilateral knee pain. Daughter called for collateral information, reports that for the past week, her father has become more confused and aggressive at home, pulling out electrical wires at home and trying to flush his clothes down the toilet. He has a HHA during the day for 12 hours x 5 days and his granddaughter stays the rest of the night with him, however, he has been becoming more aggressive and cursing out the granddaughter. He has been having urinary and bowel incontinence. He is blind in both eyes and ambulates with a walker however has been losing his balance lately with no falls. He denies any fevers, chills, CP, SOB, N/V/D, abdominal pain, dysuria, numbness/tingling in extremities.      (30 May 2024 10:14)    # [6/8] CASE D/W DAUGHTER CARLOS AT BEDSIDE. ALL QUESTIONS ANSWERED.       # [6/3] CASE DW PATIENT'S DAUGHTER CARLOS [CORRECTION FOR VELMA] ROSARIO @ [phone] . PER DAUGHTER PATIENT HAS HAD WORSENING DEMENTIA AND HAS NOTED MOOD CHANGES. SHE ALSO CONVEYS HE IS NONCOMPLIANT WITH MEDICATION DESPITE SUPPORTIVE MEASURES. SHE CONVEYS THAT FAMILY IS AGREEABLE FOR MUSHTAQ.   - SHE AND FAMILY ARE DISCUSSING WHETHER TO PURSUE LP AT THIS TIME OR NOT. NEUROLOGY TEAM DISCUSSED LP AND FURTHER WORKUP WITH FAMILY - THEY ARE DISCUSSING AS A FAMILY REGARDING WHETHER TO PURSUE FURTHER WORKUP AT THIS TIME. THEY UNDERSTAND RISKS AND BENEFITS OF FURTHER EVALUATION. THEY ALSO UNDERSTAND RISKS OF DEFERRING EVALUATION. THEY WILL DISCUSS AS A FAMILY AND DISCUSS WITH NEUROLOGY TO LET THE TEAM KNOW THEIR WISHES.   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 VENTRICULOMEGALY vs. NPH  - NOTED CT HEAD  - NOTED UA NEGATIVE  - NOTED UTOX NEGATIVE  - MRI BRAIN W/ CSF FLOW REVIEWED  - PSYCHIATRY CONSULT  - NEUROLOGY CONSULT    - ON REMERON  - ON SEROQUEL [UPTITRATING] PER PSYCH    - NEUROLOGY TEAM DISCUSSED LP AND FURTHER WORKUP WITH FAMILY    - S/P LUMBAR PUNCTURE [6/5] - OPENING PRESSURE 17MM , F/U ANALYSIS      # B/L KNEE PAIN -  LIKELY S/T OA - IMPROVED  # IMPAIRED GAIT DUE TO GENERALIZED MUSCLE WEAKNESS, POLYARTHRITIS, DIABETIC PERIPHERAL NEUROPATHY  - PRN PAIN CONTROL      # DIABETIC NEPHROPATHY  # VITA ON CKD - IMPROVED  - MONITOR CR  - AVOID NEPHROTOXIC AGENTS      # DM  - HBA1C - 11.7   - SSI + FS  - ENDOCRINOLOGY CONSULT    # HTN    # VISUALLY IMPAIRED, LIKELY DIABETIC RETINOPATHY     # GI AND DVT PPX     Patient is a 74y old  Male who presents with a chief complaint of AMS (09 Jun 2024 10:43)    PATIENT IS SEEN AND EXAMINED IN MEDICAL FLOOR.      ALLERGIES:  No Known Allergies      VITALS:    Vital Signs Last 24 Hrs  T(C): 36.3 (09 Jun 2024 05:32), Max: 36.4 (08 Jun 2024 13:00)  T(F): 97.3 (09 Jun 2024 05:32), Max: 97.6 (08 Jun 2024 13:00)  HR: 65 (09 Jun 2024 05:32) (62 - 67)  BP: 165/73 (09 Jun 2024 05:32) (145/76 - 165/73)  BP(mean): --  RR: 18 (09 Jun 2024 05:32) (18 - 18)  SpO2: 95% (09 Jun 2024 05:32) (95% - 97%)    Parameters below as of 09 Jun 2024 05:32  Patient On (Oxygen Delivery Method): room air        LABS:    CBC Full  -  ( 09 Jun 2024 06:00 )  WBC Count : 8.09 K/uL  RBC Count : 4.77 M/uL  Hemoglobin : 13.7 g/dL  Hematocrit : 41.9 %  Platelet Count - Automated : 257 K/uL  Mean Cell Volume : 87.8 fl  Mean Cell Hemoglobin : 28.7 pg  Mean Cell Hemoglobin Concentration : 32.7 gm/dL  Auto Neutrophil # : x  Auto Lymphocyte # : x  Auto Monocyte # : x  Auto Eosinophil # : x  Auto Basophil # : x  Auto Neutrophil % : x  Auto Lymphocyte % : x  Auto Monocyte % : x  Auto Eosinophil % : x  Auto Basophil % : x      06-09    140  |  110<H>  |  13  ----------------------------<  186<H>  4.0   |  24  |  1.16    Ca    9.0      09 Jun 2024 06:00  Phos  3.1     06-09  Mg     2.0     06-09      CAPILLARY BLOOD GLUCOSE      POCT Blood Glucose.: 347 mg/dL (09 Jun 2024 11:34)  POCT Blood Glucose.: 185 mg/dL (09 Jun 2024 07:50)  POCT Blood Glucose.: 161 mg/dL (08 Jun 2024 21:08)  POCT Blood Glucose.: 212 mg/dL (08 Jun 2024 16:46)          Creatinine Trend: 1.16<--, 1.13<--, 1.46<--, 1.13<--, 1.51<--, 1.18<--  I&O's Summary          .CSF CSF  06-05 @ 14:42   No growth  --    No polymorphonuclear cells seen  No organisms seen  by cytocentrifuge          MEDICATIONS:    MEDICATIONS  (STANDING):  amLODIPine   Tablet 5 milliGRAM(s) Oral every 24 hours  atorvastatin 80 milliGRAM(s) Oral at bedtime  carvedilol 12.5 milliGRAM(s) Oral every 12 hours  enoxaparin Injectable 40 milliGRAM(s) SubCutaneous every 24 hours  gabapentin 300 milliGRAM(s) Oral daily  insulin glargine Injectable (LANTUS) 32 Unit(s) SubCutaneous at bedtime  insulin lispro (ADMELOG) corrective regimen sliding scale   SubCutaneous three times a day before meals  insulin lispro (ADMELOG) corrective regimen sliding scale   SubCutaneous at bedtime  insulin lispro Injectable (ADMELOG) 5 Unit(s) SubCutaneous three times a day before meals  latanoprost 0.005% Ophthalmic Solution 1 Drop(s) Both EYES at bedtime  lisinopril 20 milliGRAM(s) Oral daily  mirtazapine 7.5 milliGRAM(s) Oral at bedtime  pantoprazole    Tablet 40 milliGRAM(s) Oral before breakfast  polyethylene glycol 3350 17 Gram(s) Oral daily  QUEtiapine 50 milliGRAM(s) Oral at bedtime  senna 2 Tablet(s) Oral at bedtime      MEDICATIONS  (PRN):  acetaminophen     Tablet .. 650 milliGRAM(s) Oral every 6 hours PRN Temp greater or equal to 38C (100.4F), Mild Pain (1 - 3)  aluminum hydroxide/magnesium hydroxide/simethicone Suspension 30 milliLiter(s) Oral every 4 hours PRN Dyspepsia  haloperidol    Injectable 1 milliGRAM(s) IntraMuscular every 8 hours PRN Agitation  melatonin 3 milliGRAM(s) Oral at bedtime PRN Insomnia  ondansetron Injectable 4 milliGRAM(s) IV Push every 8 hours PRN Nausea and/or Vomiting  QUEtiapine 50 milliGRAM(s) Oral every 6 hours PRN mild to moderate agitation/insomnia      REVIEW OF SYSTEMS:                           ALL ROS DONE [ X   ]    CONSTITUTIONAL:  LETHARGIC [   ], FEVER [   ], UNRESPONSIVE [   ]  CVS:  CP  [   ], SOB, [   ], PALPITATIONS [   ], DIZZYNESS [   ]  RS: COUGH [   ], SPUTUM [   ]  GI: ABDOMINAL PAIN [   ], NAUSEA [   ], VOMITINGS [   ], DIARRHEA [   ], CONSTIPATION [   ]  :  DYSURIA [   ], NOCTURIA [   ], INCREASED FREQUENCY [   ], DRIBLING [   ],  SKELETAL: PAINFUL JOINTS [   ], SWOLLEN JOINTS [   ], NECK ACHE [   ], LOW BACK ACHE [   ],  SKIN : ULCERS [   ], RASH [   ], ITCHING [   ]  CNS: HEAD ACHE [   ], DOUBLE VISION [   ], BLURRED VISION [   ], AMS / CONFUSION [   ], SEIZURES [   ], WEAKNESS [   ],TINGLING / NUMBNESS [   ]    PHYSICAL EXAMINATION:    GENERAL APPEARANCE: NO DISTRESS  HEENT:  NO PALLOR, NO  JVD,  NO   NODES, NECK SUPPLE  CVS: S1 +, S2 +,   RS: AEEB,  OCCASIONAL  RALES +,   NO RONCHI  ABD: SOFT, NT, NO, BS +  EXT: NO PE  SKIN: WARM,   SKELETAL:  ROM ACCEPTABLE  CNS:  AAO X 1-2        RADIOLOGY :    RADIOLOGY AND READINGS REVIEWED        ASSESSMENT :     Altered mental status        PLAN:  HPI:  74M PMH dementia, DM, and HTN presenting to the ED with AMS. Pt seen at bedside AAOX2, states he does not know why he was brought to the hospital, but was complaining of bilateral knee pain. Daughter called for collateral information, reports that for the past week, her father has become more confused and aggressive at home, pulling out electrical wires at home and trying to flush his clothes down the toilet. He has a HHA during the day for 12 hours x 5 days and his granddaughter stays the rest of the night with him, however, he has been becoming more aggressive and cursing out the granddaughter. He has been having urinary and bowel incontinence. He is blind in both eyes and ambulates with a walker however has been losing his balance lately with no falls. He denies any fevers, chills, CP, SOB, N/V/D, abdominal pain, dysuria, numbness/tingling in extremities.      (30 May 2024 10:14)    # [6/8] CASE D/W DAUGHTER CARLOS AT BEDSIDE. ALL QUESTIONS ANSWERED.       # [6/3] CASE DW PATIENT'S DAUGHTER SHELIZA [CORRECTION FOR VEMLA] ROSARIO @ [phone] . PER DAUGHTER PATIENT HAS HAD WORSENING DEMENTIA AND HAS NOTED MOOD CHANGES. SHE ALSO CONVEYS HE IS NONCOMPLIANT WITH MEDICATION DESPITE SUPPORTIVE MEASURES. SHE CONVEYS THAT FAMILY IS AGREEABLE FOR MUSHTAQ.   - SHE AND FAMILY ARE DISCUSSING WHETHER TO PURSUE LP AT THIS TIME OR NOT. NEUROLOGY TEAM DISCUSSED LP AND FURTHER WORKUP WITH FAMILY - THEY ARE DISCUSSING AS A FAMILY REGARDING WHETHER TO PURSUE FURTHER WORKUP AT THIS TIME. THEY UNDERSTAND RISKS AND BENEFITS OF FURTHER EVALUATION. THEY ALSO UNDERSTAND RISKS OF DEFERRING EVALUATION. THEY WILL DISCUSS AS A FAMILY AND DISCUSS WITH NEUROLOGY TO LET THE TEAM KNOW THEIR WISHES.   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 VENTRICULOMEGALY vs. NPH  - NOTED CT HEAD  - NOTED UA NEGATIVE  - NOTED UTOX NEGATIVE  - MRI BRAIN W/ CSF FLOW REVIEWED  - PSYCHIATRY CONSULT  - NEUROLOGY CONSULT    - ON REMERON  - ON SEROQUEL [UPTITRATING] PER PSYCH    - NEUROLOGY TEAM DISCUSSED LP AND FURTHER WORKUP WITH FAMILY    - S/P LUMBAR PUNCTURE [6/5] - OPENING PRESSURE 17MM , F/U ANALYSIS      # B/L KNEE PAIN -  LIKELY S/T OA - IMPROVED  # IMPAIRED GAIT DUE TO GENERALIZED MUSCLE WEAKNESS, POLYARTHRITIS, DIABETIC PERIPHERAL NEUROPATHY  - PRN PAIN CONTROL      # DIABETIC NEPHROPATHY  # VITA ON CKD - IMPROVED  - MONITOR CR  - AVOID NEPHROTOXIC AGENTS      # DM  - HBA1C - 11.7   - SSI + FS  - ENDOCRINOLOGY CONSULT    # HTN    # VISUALLY IMPAIRED, LIKELY DIABETIC RETINOPATHY     # GI AND DVT PPX     Patient is a 74y old  Male who presents with a chief complaint of AMS (09 Jun 2024 10:43)    PATIENT IS SEEN AND EXAMINED IN MEDICAL FLOOR.      ALLERGIES:  No Known Allergies      VITALS:    Vital Signs Last 24 Hrs  T(C): 36.3 (09 Jun 2024 05:32), Max: 36.4 (08 Jun 2024 13:00)  T(F): 97.3 (09 Jun 2024 05:32), Max: 97.6 (08 Jun 2024 13:00)  HR: 65 (09 Jun 2024 05:32) (62 - 67)  BP: 165/73 (09 Jun 2024 05:32) (145/76 - 165/73)  BP(mean): --  RR: 18 (09 Jun 2024 05:32) (18 - 18)  SpO2: 95% (09 Jun 2024 05:32) (95% - 97%)    Parameters below as of 09 Jun 2024 05:32  Patient On (Oxygen Delivery Method): room air        LABS:    CBC Full  -  ( 09 Jun 2024 06:00 )  WBC Count : 8.09 K/uL  RBC Count : 4.77 M/uL  Hemoglobin : 13.7 g/dL  Hematocrit : 41.9 %  Platelet Count - Automated : 257 K/uL  Mean Cell Volume : 87.8 fl  Mean Cell Hemoglobin : 28.7 pg  Mean Cell Hemoglobin Concentration : 32.7 gm/dL  Auto Neutrophil # : x  Auto Lymphocyte # : x  Auto Monocyte # : x  Auto Eosinophil # : x  Auto Basophil # : x  Auto Neutrophil % : x  Auto Lymphocyte % : x  Auto Monocyte % : x  Auto Eosinophil % : x  Auto Basophil % : x      06-09    140  |  110<H>  |  13  ----------------------------<  186<H>  4.0   |  24  |  1.16    Ca    9.0      09 Jun 2024 06:00  Phos  3.1     06-09  Mg     2.0     06-09      CAPILLARY BLOOD GLUCOSE      POCT Blood Glucose.: 347 mg/dL (09 Jun 2024 11:34)  POCT Blood Glucose.: 185 mg/dL (09 Jun 2024 07:50)  POCT Blood Glucose.: 161 mg/dL (08 Jun 2024 21:08)  POCT Blood Glucose.: 212 mg/dL (08 Jun 2024 16:46)          Creatinine Trend: 1.16<--, 1.13<--, 1.46<--, 1.13<--, 1.51<--, 1.18<--  I&O's Summary          .CSF CSF  06-05 @ 14:42   No growth  --    No polymorphonuclear cells seen  No organisms seen  by cytocentrifuge          MEDICATIONS:    MEDICATIONS  (STANDING):  amLODIPine   Tablet 5 milliGRAM(s) Oral every 24 hours  atorvastatin 80 milliGRAM(s) Oral at bedtime  carvedilol 12.5 milliGRAM(s) Oral every 12 hours  enoxaparin Injectable 40 milliGRAM(s) SubCutaneous every 24 hours  gabapentin 300 milliGRAM(s) Oral daily  insulin glargine Injectable (LANTUS) 32 Unit(s) SubCutaneous at bedtime  insulin lispro (ADMELOG) corrective regimen sliding scale   SubCutaneous three times a day before meals  insulin lispro (ADMELOG) corrective regimen sliding scale   SubCutaneous at bedtime  insulin lispro Injectable (ADMELOG) 5 Unit(s) SubCutaneous three times a day before meals  latanoprost 0.005% Ophthalmic Solution 1 Drop(s) Both EYES at bedtime  lisinopril 20 milliGRAM(s) Oral daily  mirtazapine 7.5 milliGRAM(s) Oral at bedtime  pantoprazole    Tablet 40 milliGRAM(s) Oral before breakfast  polyethylene glycol 3350 17 Gram(s) Oral daily  QUEtiapine 50 milliGRAM(s) Oral at bedtime  senna 2 Tablet(s) Oral at bedtime      MEDICATIONS  (PRN):  acetaminophen     Tablet .. 650 milliGRAM(s) Oral every 6 hours PRN Temp greater or equal to 38C (100.4F), Mild Pain (1 - 3)  aluminum hydroxide/magnesium hydroxide/simethicone Suspension 30 milliLiter(s) Oral every 4 hours PRN Dyspepsia  haloperidol    Injectable 1 milliGRAM(s) IntraMuscular every 8 hours PRN Agitation  melatonin 3 milliGRAM(s) Oral at bedtime PRN Insomnia  ondansetron Injectable 4 milliGRAM(s) IV Push every 8 hours PRN Nausea and/or Vomiting  QUEtiapine 50 milliGRAM(s) Oral every 6 hours PRN mild to moderate agitation/insomnia      REVIEW OF SYSTEMS:                           ALL ROS DONE [ X   ]    CONSTITUTIONAL:  LETHARGIC [   ], FEVER [   ], UNRESPONSIVE [   ]  CVS:  CP  [   ], SOB, [   ], PALPITATIONS [   ], DIZZYNESS [   ]  RS: COUGH [   ], SPUTUM [   ]  GI: ABDOMINAL PAIN [   ], NAUSEA [   ], VOMITINGS [   ], DIARRHEA [   ], CONSTIPATION [   ]  :  DYSURIA [   ], NOCTURIA [   ], INCREASED FREQUENCY [   ], DRIBLING [   ],  SKELETAL: PAINFUL JOINTS [   ], SWOLLEN JOINTS [   ], NECK ACHE [   ], LOW BACK ACHE [   ],  SKIN : ULCERS [   ], RASH [   ], ITCHING [   ]  CNS: HEAD ACHE [   ], DOUBLE VISION [   ], BLURRED VISION [   ], AMS / CONFUSION [   ], SEIZURES [   ], WEAKNESS [   ],TINGLING / NUMBNESS [   ]    PHYSICAL EXAMINATION:    GENERAL APPEARANCE: NO DISTRESS  HEENT:  NO PALLOR, NO  JVD,  NO   NODES, NECK SUPPLE  CVS: S1 +, S2 +,   RS: AEEB,  OCCASIONAL  RALES +,   NO RONCHI  ABD: SOFT, NT, NO, BS +  EXT: NO PE  SKIN: WARM,   SKELETAL:  ROM ACCEPTABLE  CNS:  AAO X 1-2        RADIOLOGY :    RADIOLOGY AND READINGS REVIEWED        ASSESSMENT :     Altered mental status        PLAN:  HPI:  74M PMH dementia, DM, and HTN presenting to the ED with AMS. Pt seen at bedside AAOX2, states he does not know why he was brought to the hospital, but was complaining of bilateral knee pain. Daughter called for collateral information, reports that for the past week, her father has become more confused and aggressive at home, pulling out electrical wires at home and trying to flush his clothes down the toilet. He has a HHA during the day for 12 hours x 5 days and his granddaughter stays the rest of the night with him, however, he has been becoming more aggressive and cursing out the granddaughter. He has been having urinary and bowel incontinence. He is blind in both eyes and ambulates with a walker however has been losing his balance lately with no falls. He denies any fevers, chills, CP, SOB, N/V/D, abdominal pain, dysuria, numbness/tingling in extremities.      (30 May 2024 10:14)    # [6/8] CASE D/W DAUGHTER CARLOS AT BEDSIDE. ALL QUESTIONS ANSWERED.       # [6/3] CASE DW PATIENT'S DAUGHTER SHELIZA [CORRECTION FOR VELMA] ROSARIO @ [phone] . PER DAUGHTER PATIENT HAS HAD WORSENING DEMENTIA AND HAS NOTED MOOD CHANGES. SHE ALSO CONVEYS HE IS NONCOMPLIANT WITH MEDICATION DESPITE SUPPORTIVE MEASURES. SHE CONVEYS THAT FAMILY IS AGREEABLE FOR MUSHTAQ.   - SHE AND FAMILY ARE DISCUSSING WHETHER TO PURSUE LP AT THIS TIME OR NOT. NEUROLOGY TEAM DISCUSSED LP AND FURTHER WORKUP WITH FAMILY - THEY ARE DISCUSSING AS A FAMILY REGARDING WHETHER TO PURSUE FURTHER WORKUP AT THIS TIME. THEY UNDERSTAND RISKS AND BENEFITS OF FURTHER EVALUATION. THEY ALSO UNDERSTAND RISKS OF DEFERRING EVALUATION. THEY WILL DISCUSS AS A FAMILY AND DISCUSS WITH NEUROLOGY TO LET THE TEAM KNOW THEIR WISHES.   [6/5] CASE D/W DAUGHTER AT BEDSIDE - WISHES TO PURSUE LP PER DISCUSSION WITH MEDICAL TEAM AND NEUROLOGY TEAM        # CM TEAM TO COORDINATE MUSHTQA PLACEMENT      # RESOLVED AMS     # LIKELY WORSENING VASCULAR DEMENTIA/CHRONIC WHITE MATTER DISEASE / CEREBRAL ISCHEMIA  # ? VENTRICULOMEGALY vs. NPH  - NOTED CT HEAD  - NOTED UA NEGATIVE  - NOTED UTOX NEGATIVE  - MRI BRAIN W/ CSF FLOW REVIEWED  - PSYCHIATRY CONSULT  - NEUROLOGY CONSULT    - ON REMERON  - ON SEROQUEL [UPTITRATING] PER PSYCH    - NEUROLOGY TEAM DISCUSSED LP AND FURTHER WORKUP WITH FAMILY    - S/P LUMBAR PUNCTURE [6/5] - OPENING PRESSURE 17MM , F/U ANALYSIS    - [6/9] - D/W NEUROLOGY, DR. AMES, RECOMMENDED ONGOING CLOSE FOLLOWUP WITH NEUROLOGY + NEUROSURGERY AS OUTPATIENT FOR FURTHER WORKUP      # B/L KNEE PAIN -  LIKELY S/T OA - IMPROVED  # IMPAIRED GAIT DUE TO GENERALIZED MUSCLE WEAKNESS, POLYARTHRITIS, DIABETIC PERIPHERAL NEUROPATHY  - PRN PAIN CONTROL      # DIABETIC NEPHROPATHY  # VITA ON CKD - IMPROVED  - MONITOR CR  - AVOID NEPHROTOXIC AGENTS      # DM  - HBA1C - 11.7   - SSI + FS  - ENDOCRINOLOGY CONSULT    # HTN    # VISUALLY IMPAIRED, LIKELY DIABETIC RETINOPATHY     # GI AND DVT PPX

## 2024-06-10 ENCOUNTER — TRANSCRIPTION ENCOUNTER (OUTPATIENT)
Age: 74
End: 2024-06-10

## 2024-06-10 VITALS
TEMPERATURE: 99 F | SYSTOLIC BLOOD PRESSURE: 123 MMHG | DIASTOLIC BLOOD PRESSURE: 83 MMHG | OXYGEN SATURATION: 96 % | HEART RATE: 100 BPM | RESPIRATION RATE: 118 BRPM

## 2024-06-10 LAB
ANION GAP SERPL CALC-SCNC: 7 MMOL/L — SIGNIFICANT CHANGE UP (ref 5–17)
BUN SERPL-MCNC: 13 MG/DL — SIGNIFICANT CHANGE UP (ref 7–18)
CALCIUM SERPL-MCNC: 9.5 MG/DL — SIGNIFICANT CHANGE UP (ref 8.4–10.5)
CHLORIDE SERPL-SCNC: 107 MMOL/L — SIGNIFICANT CHANGE UP (ref 96–108)
CO2 SERPL-SCNC: 24 MMOL/L — SIGNIFICANT CHANGE UP (ref 22–31)
CREAT SERPL-MCNC: 1.1 MG/DL — SIGNIFICANT CHANGE UP (ref 0.5–1.3)
CULTURE RESULTS: SIGNIFICANT CHANGE UP
EGFR: 70 ML/MIN/1.73M2 — SIGNIFICANT CHANGE UP
GLUCOSE BLDC GLUCOMTR-MCNC: 174 MG/DL — HIGH (ref 70–99)
GLUCOSE BLDC GLUCOMTR-MCNC: 189 MG/DL — HIGH (ref 70–99)
GLUCOSE SERPL-MCNC: 207 MG/DL — HIGH (ref 70–99)
HCT VFR BLD CALC: 42.6 % — SIGNIFICANT CHANGE UP (ref 39–50)
HGB BLD-MCNC: 14.4 G/DL — SIGNIFICANT CHANGE UP (ref 13–17)
MAGNESIUM SERPL-MCNC: 1.8 MG/DL — SIGNIFICANT CHANGE UP (ref 1.6–2.6)
MCHC RBC-ENTMCNC: 29.4 PG — SIGNIFICANT CHANGE UP (ref 27–34)
MCHC RBC-ENTMCNC: 33.8 GM/DL — SIGNIFICANT CHANGE UP (ref 32–36)
MCV RBC AUTO: 86.9 FL — SIGNIFICANT CHANGE UP (ref 80–100)
NRBC # BLD: 0 /100 WBCS — SIGNIFICANT CHANGE UP (ref 0–0)
PHOSPHATE SERPL-MCNC: 2.5 MG/DL — SIGNIFICANT CHANGE UP (ref 2.5–4.5)
PLATELET # BLD AUTO: 298 K/UL — SIGNIFICANT CHANGE UP (ref 150–400)
POTASSIUM SERPL-MCNC: 3.7 MMOL/L — SIGNIFICANT CHANGE UP (ref 3.5–5.3)
POTASSIUM SERPL-SCNC: 3.7 MMOL/L — SIGNIFICANT CHANGE UP (ref 3.5–5.3)
RBC # BLD: 4.9 M/UL — SIGNIFICANT CHANGE UP (ref 4.2–5.8)
RBC # FLD: 14 % — SIGNIFICANT CHANGE UP (ref 10.3–14.5)
SODIUM SERPL-SCNC: 138 MMOL/L — SIGNIFICANT CHANGE UP (ref 135–145)
SPECIMEN SOURCE: SIGNIFICANT CHANGE UP
WBC # BLD: 10.01 K/UL — SIGNIFICANT CHANGE UP (ref 3.8–10.5)
WBC # FLD AUTO: 10.01 K/UL — SIGNIFICANT CHANGE UP (ref 3.8–10.5)

## 2024-06-10 PROCEDURE — 87483 CNS DNA AMP PROBE TYPE 12-25: CPT

## 2024-06-10 PROCEDURE — 85610 PROTHROMBIN TIME: CPT

## 2024-06-10 PROCEDURE — 83935 ASSAY OF URINE OSMOLALITY: CPT

## 2024-06-10 PROCEDURE — 86703 HIV-1/HIV-2 1 RESULT ANTBDY: CPT

## 2024-06-10 PROCEDURE — 83615 LACTATE (LD) (LDH) ENZYME: CPT

## 2024-06-10 PROCEDURE — 84100 ASSAY OF PHOSPHORUS: CPT

## 2024-06-10 PROCEDURE — 81003 URINALYSIS AUTO W/O SCOPE: CPT

## 2024-06-10 PROCEDURE — 97530 THERAPEUTIC ACTIVITIES: CPT

## 2024-06-10 PROCEDURE — 83605 ASSAY OF LACTIC ACID: CPT

## 2024-06-10 PROCEDURE — 84300 ASSAY OF URINE SODIUM: CPT

## 2024-06-10 PROCEDURE — 36415 COLL VENOUS BLD VENIPUNCTURE: CPT

## 2024-06-10 PROCEDURE — 80048 BASIC METABOLIC PNL TOTAL CA: CPT

## 2024-06-10 PROCEDURE — 83036 HEMOGLOBIN GLYCOSYLATED A1C: CPT

## 2024-06-10 PROCEDURE — 80053 COMPREHEN METABOLIC PANEL: CPT

## 2024-06-10 PROCEDURE — 87070 CULTURE OTHR SPECIMN AEROBIC: CPT

## 2024-06-10 PROCEDURE — 80307 DRUG TEST PRSMV CHEM ANLYZR: CPT

## 2024-06-10 PROCEDURE — 99285 EMERGENCY DEPT VISIT HI MDM: CPT

## 2024-06-10 PROCEDURE — 82962 GLUCOSE BLOOD TEST: CPT

## 2024-06-10 PROCEDURE — 82945 GLUCOSE OTHER FLUID: CPT

## 2024-06-10 PROCEDURE — 82570 ASSAY OF URINE CREATININE: CPT

## 2024-06-10 PROCEDURE — 87205 SMEAR GRAM STAIN: CPT

## 2024-06-10 PROCEDURE — 85027 COMPLETE CBC AUTOMATED: CPT

## 2024-06-10 PROCEDURE — 97162 PT EVAL MOD COMPLEX 30 MIN: CPT

## 2024-06-10 PROCEDURE — 70450 CT HEAD/BRAIN W/O DYE: CPT | Mod: MC

## 2024-06-10 PROCEDURE — 89051 BODY FLUID CELL COUNT: CPT

## 2024-06-10 PROCEDURE — 83735 ASSAY OF MAGNESIUM: CPT

## 2024-06-10 PROCEDURE — 70551 MRI BRAIN STEM W/O DYE: CPT | Mod: MC

## 2024-06-10 PROCEDURE — 93005 ELECTROCARDIOGRAM TRACING: CPT

## 2024-06-10 PROCEDURE — 85025 COMPLETE CBC W/AUTO DIFF WBC: CPT

## 2024-06-10 PROCEDURE — 97116 GAIT TRAINING THERAPY: CPT

## 2024-06-10 PROCEDURE — 82140 ASSAY OF AMMONIA: CPT

## 2024-06-10 PROCEDURE — 84157 ASSAY OF PROTEIN OTHER: CPT

## 2024-06-10 RX ORDER — QUETIAPINE FUMARATE 200 MG/1
1 TABLET, FILM COATED ORAL
Qty: 0 | Refills: 0 | DISCHARGE
Start: 2024-06-10

## 2024-06-10 RX ORDER — INSULIN ASPART 100 [IU]/ML
14 INJECTION, SOLUTION SUBCUTANEOUS
Refills: 0 | DISCHARGE

## 2024-06-10 RX ORDER — POLYETHYLENE GLYCOL 3350 17 G/17G
17 POWDER, FOR SOLUTION ORAL
Qty: 0 | Refills: 0 | DISCHARGE
Start: 2024-06-10

## 2024-06-10 RX ORDER — ROSUVASTATIN CALCIUM 5 MG/1
1 TABLET ORAL
Refills: 0 | DISCHARGE

## 2024-06-10 RX ORDER — INSULIN GLARGINE 100 [IU]/ML
32 INJECTION, SOLUTION SUBCUTANEOUS
Qty: 0 | Refills: 0 | DISCHARGE
Start: 2024-06-10

## 2024-06-10 RX ORDER — ACETAMINOPHEN 500 MG
2 TABLET ORAL
Qty: 0 | Refills: 0 | DISCHARGE
Start: 2024-06-10

## 2024-06-10 RX ORDER — INSULIN LISPRO 100/ML
8 VIAL (ML) SUBCUTANEOUS
Qty: 0 | Refills: 0 | DISCHARGE
Start: 2024-06-10

## 2024-06-10 RX ORDER — ATORVASTATIN CALCIUM 80 MG/1
1 TABLET, FILM COATED ORAL
Qty: 0 | Refills: 0 | DISCHARGE
Start: 2024-06-10

## 2024-06-10 RX ORDER — INSULIN LISPRO 100/ML
8 VIAL (ML) SUBCUTANEOUS
Refills: 0 | Status: DISCONTINUED | OUTPATIENT
Start: 2024-06-10 | End: 2024-06-10

## 2024-06-10 RX ORDER — LANOLIN ALCOHOL/MO/W.PET/CERES
1 CREAM (GRAM) TOPICAL
Qty: 0 | Refills: 0 | DISCHARGE
Start: 2024-06-10

## 2024-06-10 RX ORDER — METFORMIN HYDROCHLORIDE 850 MG/1
1 TABLET ORAL
Refills: 0 | DISCHARGE

## 2024-06-10 RX ORDER — SENNA PLUS 8.6 MG/1
2 TABLET ORAL
Qty: 0 | Refills: 0 | DISCHARGE
Start: 2024-06-10

## 2024-06-10 RX ORDER — INSULIN GLARGINE 100 [IU]/ML
40 INJECTION, SOLUTION SUBCUTANEOUS
Refills: 0 | DISCHARGE

## 2024-06-10 RX ADMIN — LISINOPRIL 20 MILLIGRAM(S): 2.5 TABLET ORAL at 06:10

## 2024-06-10 RX ADMIN — PANTOPRAZOLE SODIUM 40 MILLIGRAM(S): 20 TABLET, DELAYED RELEASE ORAL at 06:11

## 2024-06-10 RX ADMIN — Medication 5 UNIT(S): at 08:21

## 2024-06-10 RX ADMIN — CARVEDILOL PHOSPHATE 12.5 MILLIGRAM(S): 80 CAPSULE, EXTENDED RELEASE ORAL at 06:10

## 2024-06-10 RX ADMIN — Medication 1: at 08:21

## 2024-06-10 RX ADMIN — QUETIAPINE FUMARATE 50 MILLIGRAM(S): 200 TABLET, FILM COATED ORAL at 03:35

## 2024-06-10 NOTE — DISCHARGE NOTE NURSING/CASE MANAGEMENT/SOCIAL WORK - [id number]
For information on Fall & Injury Prevention, visit: [email or web address] OR  [email or web address] OR  https://www.cdc.gov/steadi/patient.html

## 2024-06-10 NOTE — DISCHARGE NOTE NURSING/CASE MANAGEMENT/SOCIAL WORK - PATIENT PORTAL LINK FT
You can access the FollowMyHealth Patient Portal offered by [facility] by registering at the following website: [email or web address]. By joining [employer]’s FollowMyHealth portal, you will also be able to view your health information using other applications (apps) compatible with our system.

## 2024-06-10 NOTE — PROGRESS NOTE ADULT - SUBJECTIVE AND OBJECTIVE BOX
Interval Events:  pt in nad    Allergies    No Known Allergies    Intolerances      Endocrine/Metabolic Medications:  atorvastatin 80 milliGRAM(s) Oral at bedtime  insulin glargine Injectable (LANTUS) 32 Unit(s) SubCutaneous at bedtime  insulin lispro (ADMELOG) corrective regimen sliding scale   SubCutaneous three times a day before meals  insulin lispro (ADMELOG) corrective regimen sliding scale   SubCutaneous at bedtime  insulin lispro Injectable (ADMELOG) 5 Unit(s) SubCutaneous three times a day before meals      Vital Signs Last 24 Hrs  T(C): 37.1 (10 Fareed 2024 05:16), Max: 37.1 (10 Fareed 2024 05:16)  T(F): 98.8 (10 Fareed 2024 05:16), Max: 98.8 (10 Fareed 2024 05:16)  HR: 100 (10 Fareed 2024 05:16) (67 - 100)  BP: 123/83 (10 Fareed 2024 05:16) (123/83 - 168/72)  BP(mean): --  RR: 17 (10 Fareed 2024 05:16) (17 - 18)  SpO2: 96% (10 Fareed 2024 05:16) (96% - 99%)    Parameters below as of 10 Fareed 2024 05:16  Patient On (Oxygen Delivery Method): room air          PHYSICAL EXAM  All physical exam findings normal, except those marked:  General:	Alert, active, cooperative, NAD, well hydrated  .		[] Abnormal:  Neck		Normal: supple, no cervical adenopathy, no palpable thyroid  .		[] Abnormal:  Cardiovascular	Normal: regular rate, normal S1, S2, no murmurs  .		[] Abnormal:  Respiratory	Normal: no chest wall deformity, normal respiratory pattern, CTA B/L  .		[] Abnormal:  Abdominal	Normal: soft, ND, NT, bowel sounds present, no masses, no organomegaly  .		[] Abnormal:  		Normal normal genitalia, testes descended, circumcised/uncircumcised  .		Ahmet stage:			Breast ahmet:  .		Menstrual history:  .		[] Abnormal:  Extremities	Normal: FROM x4  .		[] Abnormal:  Skin		Normal: intact and not indurated, no rash, no acanthosis nigricans  .		[] Abnormal:  Neurologic	Normal: grossly intact  .		[] Abnormal:    LABS                        14.4   10.01 )-----------( 298      ( 10 Fareed 2024 05:15 )             42.6                               138    |  107    |  13                  Calcium: 9.5   / iCa: x      (06-10 @ 05:15)    ----------------------------<  207       Magnesium: 1.8                              3.7     |  24     |  1.10             Phosphorous: 2.5        CAPILLARY BLOOD GLUCOSE      POCT Blood Glucose.: 189 mg/dL (10 Fareed 2024 07:55)  POCT Blood Glucose.: 174 mg/dL (10 Fareed 2024 06:08)  POCT Blood Glucose.: 331 mg/dL (09 Jun 2024 20:56)  POCT Blood Glucose.: 292 mg/dL (09 Jun 2024 17:01)  POCT Blood Glucose.: 347 mg/dL (09 Jun 2024 11:34)        Assesment/plan      74M PMH dementia, DM, and HTN presenting to the ED with AMS. Found to have uncont dm. Pt admits to taking meds and insulin as out pt- given by family.            Problem/Recommendation - 1:  ·  Problem: DM (diabetes mellitus).   ·  Recommendation: poorly controlled as out pt  a1c- 11.7  mild postmeal hypo   cont lantus 32 units  inc admelog to 8 ac tid- hold if poor po intake  fsg ac and hs  d/w family- daughter states pt refuses insulin as out pt most times      Problem/Recommendation - 2:  ·  Problem: Acute encephalopathy.   ·  Recommendation: tx per prim team.

## 2024-06-10 NOTE — PROGRESS NOTE ADULT - PROVIDER SPECIALTY LIST ADULT
Endocrinology
Endocrinology
Internal Medicine
Internal Medicine
Endocrinology
Internal Medicine
Endocrinology
Endocrinology
Internal Medicine
Neurology
Internal Medicine

## 2024-07-08 PROBLEM — G91.2 NPH (NORMAL PRESSURE HYDROCEPHALUS): Status: ACTIVE | Noted: 2024-07-08

## 2024-07-08 PROBLEM — Z00.00 ENCOUNTER FOR PREVENTIVE HEALTH EXAMINATION: Status: ACTIVE | Noted: 2024-07-08

## 2024-07-09 ENCOUNTER — APPOINTMENT (OUTPATIENT)
Dept: NEUROSURGERY | Facility: CLINIC | Age: 74
End: 2024-07-09
Payer: MEDICARE

## 2024-07-09 VITALS
OXYGEN SATURATION: 97 % | SYSTOLIC BLOOD PRESSURE: 111 MMHG | HEIGHT: 65 IN | DIASTOLIC BLOOD PRESSURE: 62 MMHG | HEART RATE: 52 BPM | TEMPERATURE: 97.1 F

## 2024-07-09 DIAGNOSIS — Z86.39 PERSONAL HISTORY OF OTHER ENDOCRINE, NUTRITIONAL AND METABOLIC DISEASE: ICD-10-CM

## 2024-07-09 DIAGNOSIS — Z86.69 PERSONAL HISTORY OF OTHER DISEASES OF THE NERVOUS SYSTEM AND SENSE ORGANS: ICD-10-CM

## 2024-07-09 DIAGNOSIS — Z86.79 PERSONAL HISTORY OF OTHER DISEASES OF THE CIRCULATORY SYSTEM: ICD-10-CM

## 2024-07-09 DIAGNOSIS — G91.2 (IDIOPATHIC) NORMAL PRESSURE HYDROCEPHALUS: ICD-10-CM

## 2024-07-09 DIAGNOSIS — Z78.9 OTHER SPECIFIED HEALTH STATUS: ICD-10-CM

## 2024-07-09 DIAGNOSIS — H54.8 LEGAL BLINDNESS, AS DEFINED IN USA: ICD-10-CM

## 2024-07-09 PROCEDURE — 99203 OFFICE O/P NEW LOW 30 MIN: CPT

## 2024-07-09 RX ORDER — AMLODIPINE BESYLATE 5 MG/1
5 TABLET ORAL
Refills: 0 | Status: ACTIVE | COMMUNITY

## 2024-07-09 RX ORDER — GLUCOSAMINE HCL/CHONDROITIN SU 500-400 MG
3 CAPSULE ORAL
Refills: 0 | Status: ACTIVE | COMMUNITY

## 2024-07-09 RX ORDER — MIRTAZAPINE 15 MG/1
15 TABLET, FILM COATED ORAL
Refills: 0 | Status: ACTIVE | COMMUNITY

## 2024-07-09 RX ORDER — GABAPENTIN 300 MG/1
300 CAPSULE ORAL
Refills: 0 | Status: ACTIVE | COMMUNITY

## 2024-07-09 RX ORDER — BRIMONIDINE TARTRATE, TIMOLOL MALEATE 2; 5 MG/ML; MG/ML
0.2-0.5 SOLUTION/ DROPS OPHTHALMIC
Refills: 0 | Status: ACTIVE | COMMUNITY

## 2024-07-09 RX ORDER — FUROSEMIDE 20 MG/1
20 TABLET ORAL
Refills: 0 | Status: ACTIVE | COMMUNITY

## 2024-07-09 RX ORDER — LISINOPRIL 20 MG/1
20 TABLET ORAL
Refills: 0 | Status: ACTIVE | COMMUNITY

## 2024-07-09 RX ORDER — POLYETHYLENE GLYCOL 3350
POWDER (GRAM) MISCELLANEOUS
Refills: 0 | Status: ACTIVE | COMMUNITY

## 2024-07-09 RX ORDER — ACETAMINOPHEN 325 MG/1
325 TABLET ORAL
Refills: 0 | Status: ACTIVE | COMMUNITY

## 2024-07-09 RX ORDER — SENNOSIDES 8.6 MG/1
8.6 CAPSULE, GELATIN COATED ORAL
Refills: 0 | Status: ACTIVE | COMMUNITY

## 2024-07-09 RX ORDER — METFORMIN HYDROCHLORIDE 1000 MG/1
1000 TABLET, COATED ORAL
Refills: 0 | Status: ACTIVE | COMMUNITY

## 2024-07-09 RX ORDER — MULTIVIT-MIN/IRON/FOLIC ACID/K 18-600-40
50 MCG CAPSULE ORAL
Refills: 0 | Status: ACTIVE | COMMUNITY

## 2024-07-09 RX ORDER — INSULIN GLARGINE-YFGN 100 [IU]/ML
100 INJECTION, SOLUTION SUBCUTANEOUS
Refills: 0 | Status: ACTIVE | COMMUNITY

## 2024-07-09 RX ORDER — LATANOPROST/PF 0.005 %
0.01 DROPS OPHTHALMIC (EYE)
Refills: 0 | Status: ACTIVE | COMMUNITY

## 2024-07-09 RX ORDER — ATORVASTATIN CALCIUM 80 MG/1
80 TABLET, FILM COATED ORAL
Refills: 0 | Status: ACTIVE | COMMUNITY

## 2024-07-09 RX ORDER — OMEPRAZOLE 20 MG/1
20 CAPSULE, DELAYED RELEASE ORAL
Refills: 0 | Status: ACTIVE | COMMUNITY

## 2024-07-09 RX ORDER — CARVEDILOL 12.5 MG/1
12.5 TABLET, FILM COATED ORAL
Refills: 0 | Status: ACTIVE | COMMUNITY

## 2024-07-09 RX ORDER — INSULIN ASPART 100 [IU]/ML
100 INJECTION, SOLUTION INTRAVENOUS; SUBCUTANEOUS
Refills: 0 | Status: ACTIVE | COMMUNITY